# Patient Record
Sex: MALE | Race: BLACK OR AFRICAN AMERICAN | Employment: UNEMPLOYED | URBAN - METROPOLITAN AREA
[De-identification: names, ages, dates, MRNs, and addresses within clinical notes are randomized per-mention and may not be internally consistent; named-entity substitution may affect disease eponyms.]

---

## 2018-02-06 ENCOUNTER — HOSPITAL ENCOUNTER (EMERGENCY)
Age: 68
Discharge: HOME | End: 2018-02-06
Attending: EMERGENCY MEDICINE
Payer: MEDICARE

## 2018-02-06 ENCOUNTER — APPOINTMENT (OUTPATIENT)
Dept: GENERAL RADIOLOGY | Age: 68
End: 2018-02-06
Payer: MEDICARE

## 2018-02-06 VITALS
SYSTOLIC BLOOD PRESSURE: 147 MMHG | DIASTOLIC BLOOD PRESSURE: 100 MMHG | HEART RATE: 83 BPM | TEMPERATURE: 98.2 F | OXYGEN SATURATION: 93 % | RESPIRATION RATE: 20 BRPM

## 2018-02-06 DIAGNOSIS — J06.9 UPPER RESPIRATORY TRACT INFECTION, UNSPECIFIED TYPE: Primary | ICD-10-CM

## 2018-02-06 LAB
DIRECT EXAM: NORMAL
Lab: NORMAL
SPECIMEN DESCRIPTION: NORMAL
STATUS: NORMAL

## 2018-02-06 PROCEDURE — 71046 X-RAY EXAM CHEST 2 VIEWS: CPT

## 2018-02-06 PROCEDURE — 87804 INFLUENZA ASSAY W/OPTIC: CPT

## 2018-02-06 PROCEDURE — G0382 LEV 3 HOSP TYPE B ED VISIT: HCPCS

## 2018-02-06 RX ORDER — PSEUDOEPHEDRINE HYDROCHLORIDE 30 MG/1
30 TABLET ORAL EVERY 6 HOURS PRN
Qty: 20 TABLET | Refills: 0 | Status: SHIPPED | OUTPATIENT
Start: 2018-02-06 | End: 2018-02-13

## 2018-02-06 RX ORDER — IBUPROFEN 800 MG/1
800 TABLET ORAL EVERY 8 HOURS PRN
Qty: 30 TABLET | Refills: 0 | Status: SHIPPED | OUTPATIENT
Start: 2018-02-06

## 2018-02-06 RX ORDER — BENZONATATE 100 MG/1
100 CAPSULE ORAL 3 TIMES DAILY PRN
Qty: 30 CAPSULE | Refills: 0 | Status: SHIPPED | OUTPATIENT
Start: 2018-02-06 | End: 2018-02-13

## 2018-02-06 ASSESSMENT — ENCOUNTER SYMPTOMS
COUGH: 1
VOMITING: 0
TROUBLE SWALLOWING: 0
STRIDOR: 0
PHOTOPHOBIA: 0
WHEEZING: 0
SINUS PRESSURE: 1
CONSTIPATION: 0
DIARRHEA: 0
VOICE CHANGE: 0
SORE THROAT: 1
NAUSEA: 0
ABDOMINAL PAIN: 0
RHINORRHEA: 1

## 2018-02-06 ASSESSMENT — PAIN SCALES - GENERAL: PAINLEVEL_OUTOF10: 7

## 2018-02-06 ASSESSMENT — PAIN DESCRIPTION - PAIN TYPE: TYPE: ACUTE PAIN

## 2018-02-06 ASSESSMENT — PAIN DESCRIPTION - LOCATION: LOCATION: GENERALIZED

## 2018-02-07 NOTE — ED PROVIDER NOTES
Partners: Female     Other Topics Concern    Not on file     Social History Narrative    No narrative on file       History reviewed. No pertinent family history. Allergies:  Review of patient's allergies indicates no known allergies. Home Medications:  Prior to Admission medications    Medication Sig Start Date End Date Taking? Authorizing Provider   pseudoephedrine (DECONGESTANT) 30 MG tablet Take 1 tablet by mouth every 6 hours as needed for Congestion 2/6/18 2/13/18 Yes Leroy Barnes MD   ibuprofen (ADVIL;MOTRIN) 800 MG tablet Take 1 tablet by mouth every 8 hours as needed for Pain 2/6/18  Yes Leroy Barnes MD   benzonatate (TESSALON PERLES) 100 MG capsule Take 1 capsule by mouth 3 times daily as needed for Cough 2/6/18 2/13/18 Yes Leroy Barnes MD       REVIEW OF SYSTEMS    (2-9 systems for level 4, 10 or more for level 5)      Review of Systems   Constitutional: Positive for fatigue. Negative for activity change, appetite change, chills, diaphoresis and fever. HENT: Positive for congestion, rhinorrhea, sinus pressure and sore throat. Negative for trouble swallowing and voice change. Eyes: Negative for photophobia and visual disturbance. Respiratory: Positive for cough. Negative for wheezing and stridor. Cardiovascular: Negative for chest pain, palpitations and leg swelling. Gastrointestinal: Negative for abdominal pain, constipation, diarrhea, nausea and vomiting. Musculoskeletal: Negative for arthralgias and myalgias. Skin: Negative for rash and wound. Neurological: Negative for dizziness, weakness, light-headedness and numbness. Psychiatric/Behavioral: Negative for agitation, behavioral problems and confusion. PHYSICAL EXAM   (up to 7 for level 4, 8 or more for level 5)      INITIAL VITALS:   BP (!) 147/100   Pulse 83   Temp 98.2 °F (36.8 °C) (Oral)   Resp 20   SpO2 93%     Physical Exam   Constitutional: He appears well-developed and well-nourished. No distress. as needed for Pain    PSEUDOEPHEDRINE (DECONGESTANT) 30 MG TABLET    Take 1 tablet by mouth every 6 hours as needed for Congestion       Fortunato Brunson MD  Emergency Medicine Resident    (Please note that portions of this note were completed with a voice recognition program.  Efforts were made to edit the dictations but occasionally words are mis-transcribed.)       Fortunato Brunson MD  Resident  02/06/18 [id number]

## 2018-02-07 NOTE — ED PROVIDER NOTES
[facility]     Emergency Department     Faculty Attestation    I performed a history and physical examination of the patient and discussed management with the resident. I reviewed the residents note and agree with the documented findings including all diagnostic interpretations and plan of care. Any areas of disagreement are noted on the chart. I was personally present for the key portions of any procedures. I have documented in the chart those procedures where I was not present during the key portions. I have reviewed the emergency nurses triage note. I agree with the chief complaint, past medical history, past surgical history, allergies, medications, social and family history as documented unless otherwise noted below. Documentation of the HPI, Physical Exam and Medical Decision Making performed by christopheribregan is based on my personal performance of the HPI, PE and MDM. For Physician Assistant/ Nurse Practitioner cases/documentation I have personally evaluated this patient and have completed at least one if not all key elements of the E/M (history, physical exam, and MDM). Additional findings are as noted. Primary Care Physician: Aminata Spaulding    History: This is a 74 y.o. male who presents to the Emergency Department with complaint of generalized body aches, cough, congestion. History of COPD. Ongoing since Sunday. Has had difficulty getting around due to fatigue. No chest pain. No vomiting. No fevers. Physical:     oral temperature is 98.2 °F (36.8 °C). His blood pressure is 147/100 (abnormal) and his pulse is 83. His respiration is 20 and oxygen saturation is 93%. 74 y.o. male ill but nontoxic-appearing, oropharynx clear moist L cardiac exam regular rate and rhythm no murmurs or gallops, pulmonary auscultation bilaterally, abdomen is soft nontender nondistended. Radial pulses 2+.     Impression: Viral URI    Plan: Flu swab, chest x-ray,

## 2018-03-22 ENCOUNTER — HOSPITAL ENCOUNTER (INPATIENT)
Age: 68
LOS: 2 days | Discharge: HOME | DRG: 195 | End: 2018-03-24
Attending: EMERGENCY MEDICINE | Admitting: FAMILY MEDICINE
Payer: MEDICARE

## 2018-03-22 ENCOUNTER — APPOINTMENT (OUTPATIENT)
Dept: GENERAL RADIOLOGY | Age: 68
DRG: 195 | End: 2018-03-22
Payer: MEDICARE

## 2018-03-22 DIAGNOSIS — R50.9 FEVER AND CHILLS: ICD-10-CM

## 2018-03-22 DIAGNOSIS — D72.829 LEUKOCYTOSIS, UNSPECIFIED TYPE: ICD-10-CM

## 2018-03-22 DIAGNOSIS — J18.9 PNEUMONIA DUE TO ORGANISM: Primary | ICD-10-CM

## 2018-03-22 DIAGNOSIS — I10 HYPERTENSION, UNSPECIFIED TYPE: ICD-10-CM

## 2018-03-22 LAB
% CKMB: 0.9 % (ref 0–3.5)
ABSOLUTE EOS #: <0.03 K/UL (ref 0–0.44)
ABSOLUTE IMMATURE GRANULOCYTE: 0.09 K/UL (ref 0–0.3)
ABSOLUTE LYMPH #: 2.32 K/UL (ref 1.1–3.7)
ABSOLUTE MONO #: 1.37 K/UL (ref 0.1–1.2)
ALBUMIN SERPL-MCNC: 3.7 G/DL (ref 3.5–5.2)
ALBUMIN/GLOBULIN RATIO: 1 (ref 1–2.5)
ALP BLD-CCNC: 61 U/L (ref 40–129)
ALT SERPL-CCNC: 13 U/L (ref 5–41)
ANION GAP SERPL CALCULATED.3IONS-SCNC: 13 MMOL/L (ref 9–17)
AST SERPL-CCNC: 19 U/L
BASOPHILS # BLD: 0 % (ref 0–2)
BASOPHILS ABSOLUTE: 0.03 K/UL (ref 0–0.2)
BILIRUB SERPL-MCNC: 0.51 MG/DL (ref 0.3–1.2)
BILIRUBIN DIRECT: 0.21 MG/DL
BILIRUBIN, INDIRECT: 0.3 MG/DL (ref 0–1)
BUN BLDV-MCNC: 11 MG/DL (ref 8–23)
BUN/CREAT BLD: NORMAL (ref 9–20)
CALCIUM IONIZED: 1.22 MMOL/L (ref 1.13–1.33)
CALCIUM SERPL-MCNC: 9.1 MG/DL (ref 8.6–10.4)
CHLORIDE BLD-SCNC: 100 MMOL/L (ref 98–107)
CK MB: 1.1 NG/ML
CKMB INTERPRETATION: NORMAL
CO2: 24 MMOL/L (ref 20–31)
CREAT SERPL-MCNC: 0.85 MG/DL (ref 0.7–1.2)
DIFFERENTIAL TYPE: ABNORMAL
EKG ATRIAL RATE: 93 BPM
EKG P AXIS: 80 DEGREES
EKG P-R INTERVAL: 174 MS
EKG Q-T INTERVAL: 334 MS
EKG QRS DURATION: 96 MS
EKG QTC CALCULATION (BAZETT): 415 MS
EKG R AXIS: -61 DEGREES
EKG T AXIS: 81 DEGREES
EKG VENTRICULAR RATE: 93 BPM
EOSINOPHILS RELATIVE PERCENT: 0 % (ref 1–4)
GFR AFRICAN AMERICAN: >60 ML/MIN
GFR NON-AFRICAN AMERICAN: >60 ML/MIN
GFR SERPL CREATININE-BSD FRML MDRD: NORMAL ML/MIN/{1.73_M2}
GFR SERPL CREATININE-BSD FRML MDRD: NORMAL ML/MIN/{1.73_M2}
GLOBULIN: NORMAL G/DL (ref 1.5–3.8)
GLUCOSE BLD-MCNC: 97 MG/DL (ref 70–99)
HCT VFR BLD CALC: 40.5 % (ref 40.7–50.3)
HEMOGLOBIN: 12.6 G/DL (ref 13–17)
IMMATURE GRANULOCYTES: 1 %
INR BLD: 0.9
LACTIC ACID, WHOLE BLOOD: 1.2 MMOL/L (ref 0.7–2.1)
LYMPHOCYTES # BLD: 13 % (ref 24–43)
MCH RBC QN AUTO: 22.8 PG (ref 25.2–33.5)
MCHC RBC AUTO-ENTMCNC: 31.1 G/DL (ref 28.4–34.8)
MCV RBC AUTO: 73.2 FL (ref 82.6–102.9)
MONOCYTES # BLD: 8 % (ref 3–12)
NRBC AUTOMATED: 0 PER 100 WBC
PARTIAL THROMBOPLASTIN TIME: 26.2 SEC (ref 20.5–30.5)
PDW BLD-RTO: 17.3 % (ref 11.8–14.4)
PLATELET # BLD: ABNORMAL K/UL (ref 138–453)
PLATELET ESTIMATE: ABNORMAL
PLATELET, FLUORESCENCE: 155 K/UL (ref 138–453)
PLATELET, IMMATURE FRACTION: 9.6 % (ref 1.1–10.3)
PMV BLD AUTO: ABNORMAL FL (ref 8.1–13.5)
POC TROPONIN I: 0.01 NG/ML (ref 0–0.1)
POC TROPONIN INTERP: NORMAL
POTASSIUM SERPL-SCNC: 3.8 MMOL/L (ref 3.7–5.3)
PROTHROMBIN TIME: 9.9 SEC (ref 9–12)
RBC # BLD: 5.53 M/UL (ref 4.21–5.77)
RBC # BLD: ABNORMAL 10*6/UL
SEG NEUTROPHILS: 79 % (ref 36–65)
SEGMENTED NEUTROPHILS ABSOLUTE COUNT: 14.32 K/UL (ref 1.5–8.1)
SODIUM BLD-SCNC: 137 MMOL/L (ref 135–144)
TOTAL CK: 122 U/L (ref 39–308)
TOTAL PROTEIN: 7.3 G/DL (ref 6.4–8.3)
WBC # BLD: 18.1 K/UL (ref 3.5–11.3)
WBC # BLD: ABNORMAL 10*3/UL

## 2018-03-22 PROCEDURE — 87040 BLOOD CULTURE FOR BACTERIA: CPT

## 2018-03-22 PROCEDURE — 85025 COMPLETE CBC W/AUTO DIFF WBC: CPT

## 2018-03-22 PROCEDURE — 82553 CREATINE MB FRACTION: CPT

## 2018-03-22 PROCEDURE — 2580000003 HC RX 258: Performed by: NURSE PRACTITIONER

## 2018-03-22 PROCEDURE — 82330 ASSAY OF CALCIUM: CPT

## 2018-03-22 PROCEDURE — 80076 HEPATIC FUNCTION PANEL: CPT

## 2018-03-22 PROCEDURE — 6360000002 HC RX W HCPCS: Performed by: EMERGENCY MEDICINE

## 2018-03-22 PROCEDURE — 84484 ASSAY OF TROPONIN QUANT: CPT

## 2018-03-22 PROCEDURE — 99285 EMERGENCY DEPT VISIT HI MDM: CPT

## 2018-03-22 PROCEDURE — 85730 THROMBOPLASTIN TIME PARTIAL: CPT

## 2018-03-22 PROCEDURE — 71046 X-RAY EXAM CHEST 2 VIEWS: CPT

## 2018-03-22 PROCEDURE — 82550 ASSAY OF CK (CPK): CPT

## 2018-03-22 PROCEDURE — 6370000000 HC RX 637 (ALT 250 FOR IP): Performed by: EMERGENCY MEDICINE

## 2018-03-22 PROCEDURE — 85055 RETICULATED PLATELET ASSAY: CPT

## 2018-03-22 PROCEDURE — 80048 BASIC METABOLIC PNL TOTAL CA: CPT

## 2018-03-22 PROCEDURE — 83605 ASSAY OF LACTIC ACID: CPT

## 2018-03-22 PROCEDURE — 85610 PROTHROMBIN TIME: CPT

## 2018-03-22 PROCEDURE — 1200000000 HC SEMI PRIVATE

## 2018-03-22 PROCEDURE — 93005 ELECTROCARDIOGRAM TRACING: CPT

## 2018-03-22 RX ORDER — BENZONATATE 100 MG/1
100 CAPSULE ORAL ONCE
Status: COMPLETED | OUTPATIENT
Start: 2018-03-22 | End: 2018-03-22

## 2018-03-22 RX ORDER — NICOTINE 21 MG/24HR
1 PATCH, TRANSDERMAL 24 HOURS TRANSDERMAL DAILY PRN
Status: DISCONTINUED | OUTPATIENT
Start: 2018-03-22 | End: 2018-03-24 | Stop reason: HOSPADM

## 2018-03-22 RX ORDER — ACETAMINOPHEN 325 MG/1
650 TABLET ORAL EVERY 4 HOURS PRN
Status: DISCONTINUED | OUTPATIENT
Start: 2018-03-22 | End: 2018-03-24 | Stop reason: HOSPADM

## 2018-03-22 RX ORDER — DOCUSATE SODIUM 100 MG/1
100 CAPSULE, LIQUID FILLED ORAL 2 TIMES DAILY
Status: DISCONTINUED | OUTPATIENT
Start: 2018-03-22 | End: 2018-03-24 | Stop reason: HOSPADM

## 2018-03-22 RX ORDER — ASPIRIN 81 MG/1
324 TABLET, CHEWABLE ORAL ONCE
Status: COMPLETED | OUTPATIENT
Start: 2018-03-22 | End: 2018-03-22

## 2018-03-22 RX ORDER — LEVOFLOXACIN 5 MG/ML
750 INJECTION, SOLUTION INTRAVENOUS ONCE
Status: DISCONTINUED | OUTPATIENT
Start: 2018-03-22 | End: 2018-03-22

## 2018-03-22 RX ORDER — IPRATROPIUM BROMIDE AND ALBUTEROL SULFATE 2.5; .5 MG/3ML; MG/3ML
1 SOLUTION RESPIRATORY (INHALATION)
Status: DISCONTINUED | OUTPATIENT
Start: 2018-03-23 | End: 2018-03-24 | Stop reason: HOSPADM

## 2018-03-22 RX ORDER — SODIUM CHLORIDE 0.9 % (FLUSH) 0.9 %
10 SYRINGE (ML) INJECTION EVERY 12 HOURS SCHEDULED
Status: DISCONTINUED | OUTPATIENT
Start: 2018-03-22 | End: 2018-03-24 | Stop reason: HOSPADM

## 2018-03-22 RX ORDER — NITROGLYCERIN 0.4 MG/1
0.4 TABLET SUBLINGUAL ONCE
Status: COMPLETED | OUTPATIENT
Start: 2018-03-22 | End: 2018-03-22

## 2018-03-22 RX ORDER — ACETAMINOPHEN 325 MG/1
650 TABLET ORAL ONCE
Status: DISCONTINUED | OUTPATIENT
Start: 2018-03-22 | End: 2018-03-22

## 2018-03-22 RX ORDER — HYDROCODONE BITARTRATE AND ACETAMINOPHEN 5; 325 MG/1; MG/1
2 TABLET ORAL EVERY 4 HOURS PRN
Status: DISCONTINUED | OUTPATIENT
Start: 2018-03-22 | End: 2018-03-24 | Stop reason: HOSPADM

## 2018-03-22 RX ORDER — HYDROCODONE BITARTRATE AND ACETAMINOPHEN 5; 325 MG/1; MG/1
1 TABLET ORAL EVERY 4 HOURS PRN
Status: DISCONTINUED | OUTPATIENT
Start: 2018-03-22 | End: 2018-03-24 | Stop reason: HOSPADM

## 2018-03-22 RX ORDER — ALBUTEROL SULFATE 2.5 MG/3ML
2.5 SOLUTION RESPIRATORY (INHALATION)
Status: DISCONTINUED | OUTPATIENT
Start: 2018-03-22 | End: 2018-03-24 | Stop reason: HOSPADM

## 2018-03-22 RX ORDER — SODIUM CHLORIDE 0.9 % (FLUSH) 0.9 %
10 SYRINGE (ML) INJECTION PRN
Status: DISCONTINUED | OUTPATIENT
Start: 2018-03-22 | End: 2018-03-24 | Stop reason: HOSPADM

## 2018-03-22 RX ORDER — ONDANSETRON 2 MG/ML
4 INJECTION INTRAMUSCULAR; INTRAVENOUS EVERY 6 HOURS PRN
Status: DISCONTINUED | OUTPATIENT
Start: 2018-03-22 | End: 2018-03-24 | Stop reason: HOSPADM

## 2018-03-22 RX ORDER — BISACODYL 10 MG
10 SUPPOSITORY, RECTAL RECTAL DAILY PRN
Status: DISCONTINUED | OUTPATIENT
Start: 2018-03-22 | End: 2018-03-24 | Stop reason: HOSPADM

## 2018-03-22 RX ORDER — SODIUM CHLORIDE 9 MG/ML
INJECTION, SOLUTION INTRAVENOUS CONTINUOUS
Status: DISCONTINUED | OUTPATIENT
Start: 2018-03-22 | End: 2018-03-24 | Stop reason: HOSPADM

## 2018-03-22 RX ADMIN — ASPIRIN 81 MG 324 MG: 81 TABLET ORAL at 20:37

## 2018-03-22 RX ADMIN — LEVOFLOXACIN 750 MG: 5 INJECTION, SOLUTION INTRAVENOUS at 22:07

## 2018-03-22 RX ADMIN — BENZONATATE 100 MG: 100 CAPSULE ORAL at 20:37

## 2018-03-22 RX ADMIN — NITROGLYCERIN 0.4 MG: 0.4 TABLET SUBLINGUAL at 20:38

## 2018-03-22 RX ADMIN — SODIUM CHLORIDE: 9 INJECTION, SOLUTION INTRAVENOUS at 23:19

## 2018-03-22 ASSESSMENT — PAIN SCALES - GENERAL
PAINLEVEL_OUTOF10: 9
PAINLEVEL_OUTOF10: 7
PAINLEVEL_OUTOF10: 4

## 2018-03-22 ASSESSMENT — HEART SCORE: ECG: 1

## 2018-03-22 ASSESSMENT — PAIN DESCRIPTION - PAIN TYPE
TYPE: ACUTE PAIN

## 2018-03-22 ASSESSMENT — PAIN DESCRIPTION - LOCATION
LOCATION: CHEST

## 2018-03-22 ASSESSMENT — PAIN DESCRIPTION - ONSET: ONSET: OTHER (COMMENT)

## 2018-03-22 ASSESSMENT — PAIN DESCRIPTION - ORIENTATION: ORIENTATION: MID

## 2018-03-22 NOTE — ED PROVIDER NOTES
[facility]  ED  Emergency Department Encounter  Emergency Medicine Resident     Pt Name: Gemini Jorge  MRN: [id number]  [place] 1950  Date of evaluation: 3/22/18  PCP:  Jacinta Gruber       Chief Complaint   Patient presents with    Chest Pain    Cough       HISTORY OF PRESENT ILLNESS  (Location/Symptom, Timing/Onset, Context/Setting, Quality, Duration, Modifying Factors, Severity.)      Gemini Jorge is a 74 y.o. male who presents with acute  entire  chest pain that has been on going for 1 day. No radiation. It is pressure and last hours and not aggravated or relieved by anything including medications, position or breathing. Nyquil been given. Patient does not have adequate pain control. Moderate severity. Associated symptoms:  Nausea/Vomitting no  Diaphoresis slight  F/C:chills  Productive cough: yellow    Pt denied his CP being sudden onset ripping, tearing, and radiating to the back. Pt denied any recent surgeries, trauma, deep vein thrombosis, pulmonary embolisms, history of cancer, hormone use,  unilateral leg swelling, travel    PAST MEDICAL / SURGICAL / SOCIAL / FAMILY HISTORY      has a past medical history of Arthritis; CAD (coronary artery disease); Headache(784.0); Hyperlipidemia; Hypertension; Pneumonia; and Unspecified cerebral artery occlusion with cerebral infarction. has a past surgical history that includes fracture surgery. Social History     Social History    Marital status:      Spouse name: N/A    Number of children: N/A    Years of education: N/A     Occupational History    Not on file.      Social History Main Topics    Smoking status: Current Every Day Smoker     Packs/day: 1.00    Smokeless tobacco: Never Used    Alcohol use 0.6 oz/week     1 Cans of beer per week    Drug use: Yes     Types: Marijuana    Sexual activity: Yes     Partners: Female     Other Topics Concern    Not on file     Social History Narrative    EKG):  Orders Placed This Encounter   Procedures    Culture Blood #1    Culture Blood #1    Urine Culture    XR CHEST STANDARD (2 VW)    CBC Auto Differential    Basic Metabolic Panel    Calcium, Ionized    Immature Platelet Fraction    Urinalysis with Microscopic    Lactic Acid, Whole Blood    CK isoenzymes    Hepatic Function Panel    Protime-INR    APTT    Telemetry monitoring    Inpatient consult to Hospitalist    Pulse oximetry, continuous    POCT troponin    POCT troponin    EKG 12 Lead    Insert peripheral IV    PATIENT STATUS (FROM ED OR OR/PROCEDURAL) Inpatient       MEDICATIONS ORDERED:  Orders Placed This Encounter   Medications    aspirin chewable tablet 324 mg    nitroGLYCERIN (NITROSTAT) SL tablet 0.4 mg    benzonatate (TESSALON) capsule 100 mg    levofloxacin (LEVAQUIN) 750 MG/150ML infusion 750 mg    acetaminophen (TYLENOL) tablet 650 mg       DDX: ACS, tension pneumothorax pulmonary embolism aortic dissection esophageal rupture.     DIAGNOSTIC RESULTS / EMERGENCY DEPARTMENT COURSE / MDM     LABS:  Results for orders placed or performed during the hospital encounter of 03/22/18   CBC Auto Differential   Result Value Ref Range    WBC 18.1 (H) 3.5 - 11.3 k/uL    RBC 5.53 4.21 - 5.77 m/uL    Hemoglobin 12.6 (L) 13.0 - 17.0 g/dL    Hematocrit 40.5 (L) 40.7 - 50.3 %    MCV 73.2 (L) 82.6 - 102.9 fL    MCH 22.8 (L) 25.2 - 33.5 pg    MCHC 31.1 28.4 - 34.8 g/dL    RDW 17.3 (H) 11.8 - 14.4 %    Platelets See Reflexed IPF Result 138 - 453 k/uL    MPV NOT REPORTED 8.1 - 13.5 fL    NRBC Automated 0.0 0.0 per 100 WBC    Differential Type NOT REPORTED     WBC Morphology NOT REPORTED     RBC Morphology ANISOCYTOSIS PRESENT     Platelet Estimate NOT REPORTED     Seg Neutrophils 79 (H) 36 - 65 %    Lymphocytes 13 (L) 24 - 43 %    Monocytes 8 3 - 12 %    Eosinophils % 0 (L) 1 - 4 %    Basophils 0 0 - 2 %    Immature Granulocytes 1 (H) 0 %    Segs Absolute 14.32 (H) 1.50 - 8.10 k/uL    Absolute

## 2018-03-23 ENCOUNTER — APPOINTMENT (OUTPATIENT)
Dept: GENERAL RADIOLOGY | Age: 68
DRG: 195 | End: 2018-03-23
Payer: MEDICARE

## 2018-03-23 LAB
ANION GAP SERPL CALCULATED.3IONS-SCNC: 13 MMOL/L (ref 9–17)
BNP INTERPRETATION: NORMAL
BUN BLDV-MCNC: 11 MG/DL (ref 8–23)
BUN/CREAT BLD: NORMAL (ref 9–20)
CALCIUM SERPL-MCNC: 8.9 MG/DL (ref 8.6–10.4)
CHLORIDE BLD-SCNC: 102 MMOL/L (ref 98–107)
CO2: 24 MMOL/L (ref 20–31)
CREAT SERPL-MCNC: 0.76 MG/DL (ref 0.7–1.2)
CULTURE: NORMAL
D-DIMER QUANTITATIVE: 0.91 MG/L FEU
GFR AFRICAN AMERICAN: >60 ML/MIN
GFR NON-AFRICAN AMERICAN: >60 ML/MIN
GFR SERPL CREATININE-BSD FRML MDRD: NORMAL ML/MIN/{1.73_M2}
GFR SERPL CREATININE-BSD FRML MDRD: NORMAL ML/MIN/{1.73_M2}
GLUCOSE BLD-MCNC: 96 MG/DL (ref 70–99)
HCT VFR BLD CALC: 35.4 % (ref 40.7–50.3)
HEMOGLOBIN: 11.2 G/DL (ref 13–17)
Lab: NORMAL
Lab: NORMAL
MCH RBC QN AUTO: 23.5 PG (ref 25.2–33.5)
MCHC RBC AUTO-ENTMCNC: 31.6 G/DL (ref 28.4–34.8)
MCV RBC AUTO: 74.4 FL (ref 82.6–102.9)
MYOGLOBIN: 23 NG/ML (ref 28–72)
NRBC AUTOMATED: 0 PER 100 WBC
PDW BLD-RTO: 17.2 % (ref 11.8–14.4)
PLATELET # BLD: ABNORMAL K/UL (ref 138–453)
PLATELET, FLUORESCENCE: 169 K/UL (ref 138–453)
PLATELET, IMMATURE FRACTION: 8 % (ref 1.1–10.3)
PMV BLD AUTO: ABNORMAL FL (ref 8.1–13.5)
POTASSIUM SERPL-SCNC: 3.9 MMOL/L (ref 3.7–5.3)
PRO-BNP: 156 PG/ML
RBC # BLD: 4.76 M/UL (ref 4.21–5.77)
SODIUM BLD-SCNC: 139 MMOL/L (ref 135–144)
SPECIMEN DESCRIPTION: NORMAL
SPECIMEN DESCRIPTION: NORMAL
STATUS: NORMAL
STATUS: NORMAL
TROPONIN INTERP: ABNORMAL
TROPONIN T: <0.03 NG/ML
WBC # BLD: 14.8 K/UL (ref 3.5–11.3)

## 2018-03-23 PROCEDURE — 36415 COLL VENOUS BLD VENIPUNCTURE: CPT

## 2018-03-23 PROCEDURE — 84484 ASSAY OF TROPONIN QUANT: CPT

## 2018-03-23 PROCEDURE — 6370000000 HC RX 637 (ALT 250 FOR IP): Performed by: NURSE PRACTITIONER

## 2018-03-23 PROCEDURE — 1200000000 HC SEMI PRIVATE

## 2018-03-23 PROCEDURE — 99222 1ST HOSP IP/OBS MODERATE 55: CPT | Performed by: FAMILY MEDICINE

## 2018-03-23 PROCEDURE — 71046 X-RAY EXAM CHEST 2 VIEWS: CPT

## 2018-03-23 PROCEDURE — 83874 ASSAY OF MYOGLOBIN: CPT

## 2018-03-23 PROCEDURE — 94640 AIRWAY INHALATION TREATMENT: CPT

## 2018-03-23 PROCEDURE — 94762 N-INVAS EAR/PLS OXIMTRY CONT: CPT

## 2018-03-23 PROCEDURE — 85027 COMPLETE CBC AUTOMATED: CPT

## 2018-03-23 PROCEDURE — 80048 BASIC METABOLIC PNL TOTAL CA: CPT

## 2018-03-23 PROCEDURE — 2580000003 HC RX 258: Performed by: NURSE PRACTITIONER

## 2018-03-23 PROCEDURE — 6360000002 HC RX W HCPCS: Performed by: NURSE PRACTITIONER

## 2018-03-23 PROCEDURE — 85379 FIBRIN DEGRADATION QUANT: CPT

## 2018-03-23 PROCEDURE — 83880 ASSAY OF NATRIURETIC PEPTIDE: CPT

## 2018-03-23 PROCEDURE — 85055 RETICULATED PLATELET ASSAY: CPT

## 2018-03-23 RX ADMIN — IPRATROPIUM BROMIDE AND ALBUTEROL SULFATE 1 AMPULE: .5; 3 SOLUTION RESPIRATORY (INHALATION) at 08:38

## 2018-03-23 RX ADMIN — IPRATROPIUM BROMIDE AND ALBUTEROL SULFATE 1 AMPULE: .5; 3 SOLUTION RESPIRATORY (INHALATION) at 16:15

## 2018-03-23 RX ADMIN — CEFTRIAXONE SODIUM 1 G: 1 INJECTION, POWDER, FOR SOLUTION INTRAMUSCULAR; INTRAVENOUS at 23:44

## 2018-03-23 RX ADMIN — AZITHROMYCIN MONOHYDRATE 500 MG: 500 INJECTION, POWDER, LYOPHILIZED, FOR SOLUTION INTRAVENOUS at 01:49

## 2018-03-23 RX ADMIN — AZITHROMYCIN MONOHYDRATE 500 MG: 500 INJECTION, POWDER, LYOPHILIZED, FOR SOLUTION INTRAVENOUS at 23:44

## 2018-03-23 RX ADMIN — IPRATROPIUM BROMIDE AND ALBUTEROL SULFATE 1 AMPULE: .5; 3 SOLUTION RESPIRATORY (INHALATION) at 20:55

## 2018-03-23 RX ADMIN — IPRATROPIUM BROMIDE AND ALBUTEROL SULFATE 1 AMPULE: .5; 3 SOLUTION RESPIRATORY (INHALATION) at 12:06

## 2018-03-23 RX ADMIN — CEFTRIAXONE SODIUM 1 G: 1 INJECTION, POWDER, FOR SOLUTION INTRAMUSCULAR; INTRAVENOUS at 01:50

## 2018-03-23 NOTE — CARE COORDINATION
Case Management Initial Discharge Plan  Maria C Ahuja,         Readmission Risk              Readmission Risk:        3.5       Age 74 or Greater:  1    Admitted from SNF or Requires Paid or Family Care:  0    Currently has CHF,COPD,ARF,CRI,or is on dialysis:  0    Takes more than 5 Prescription Medications:  0    Takes Digoxin,Insulin,Anticoagulants,Narcotics or ASA/Plavix:  [address] in Past 12 Months:  0    On Disability:  0    Patient Considers own Health:  2.5            Met with:patient to discuss discharge plans.    Information verified: address, contacts, phone number, , insurance Yes  PCP: Dr. Tomas Ocasio  Date of last visit: past 6 months    Insurance Provider: [address]    Discharge Planning  Current Residence:  Private Residence  Living Arrangements:  Spouse/Significant Other   Home has 1 stories/few stairs to climb  Support Systems:  Spouse/Significant Other, Children  Current Services PTA:  None Supplier:   Patient able to perform ADL's:Independent  DME used to aid ambulation prior to admission:  None /during admission none    Potential Assistance Needed:  N/A    Pharmacy: [facility] on  Medications:  No  Does patient want to participate in local refill/ meds to beds program?  No    Patient agreeable to home care: No  [place] of choice provided:  n/a      Type of Home Care Services:  None  Patient expects to be discharged to:  Home    Prior SNF/Rehab Placement and Facility:   Agreeable to SNF/Rehab: No  [place] of choice provided: n/a   Evaluation: no    Expected Discharge date:  18  Follow Up Appointment: Best Day/ Time:      Transportation provider:  Self/wife  Transportation arrangements needed for discharge: Yes    Discharge Plan: home independently        Electronically signed by Fitch RN on 3/23/18 at 4:45 PM

## 2018-03-23 NOTE — PROGRESS NOTES
Patient refused Lovenox injection,   RN education on purpose and DVT prevention, pt still refused   Jnaa Rodriguez RN

## 2018-03-23 NOTE — H&P
[facility]    HISTORY AND PHYSICAL EXAMINATION            Date:   3/23/2018  Patient name:  Maria C Ahuja  Date of admission:  3/22/2018  7:29 PM  MRN:   [id number]  Account:  [de-identified]  YOB: 1950  PCP:    Tana Carver  Room:   4007/2593-09  Code Status:    Full Code    Chief Complaint:     Chief Complaint   Patient presents with    Chest Pain    Cough       History Obtained From:     patient, electronic medical record    History of Present Illness: The patient is a 74 y.o. Non-/non  male who presents with Chest Pain and Cough   and he is admitted to the hospital for the management of Pneumonia. 30-year-old male presented to ED with chest pain and shortness of breath. Patient describes chest pain that sharp, constant, and located diffusely in bilateral chest and abdomen, with no alleviating or aggravating factors. Patient states shortness of breath going since Wednesday. Denies any fever, but reports having chills and nausea. No vomiting. Reports having cough with yellow sputum. CXR with small LLL opacity. WBC elevated at 18. Patient reports having wheezing yesterday. No wheezing today. Past Medical History:     Past Medical History:   Diagnosis Date    Arthritis     CAD (coronary artery disease)     Headache(784.0)     Hyperlipidemia     Hypertension     Pneumonia     Unspecified cerebral artery occlusion with cerebral infarction         Past Surgical History:     Past Surgical History:   Procedure Laterality Date    FRACTURE SURGERY          Medications Prior to Admission:     Prior to Admission medications    Medication Sig Start Date End Date Taking? Authorizing Provider   ibuprofen (ADVIL;MOTRIN) 800 MG tablet Take 1 tablet by mouth every 8 hours as needed for Pain 2/6/18   Fortunato Brunson MD        Allergies:     Patient has no known allergies.     Social History:     Tobacco: Phosphatase 61 40 - 129 U/L    ALT 13 5 - 41 U/L    AST 19 <40 U/L    Total Bilirubin 0.51 0.3 - 1.2 mg/dL    Bilirubin, Direct 0.21 <0.31 mg/dL    Bilirubin, Indirect 0.30 0.00 - 1.00 mg/dL    Total Protein 7.3 6.4 - 8.3 g/dL    Globulin NOT REPORTED 1.5 - 3.8 g/dL    Albumin/Globulin Ratio 1.0 1.0 - 2.5   Protime-INR    Collection Time: 03/22/18  8:16 PM   Result Value Ref Range    Protime 9.9 9.0 - 12.0 sec    INR 0.9    APTT    Collection Time: 03/22/18  8:16 PM   Result Value Ref Range    PTT 26.2 20.5 - 30.5 sec   Culture Blood #1    Collection Time: 03/22/18  9:05 PM   Result Value Ref Range    Specimen Description . BLOOD     Special Requests  L AC 11ML     Culture NO GROWTH 15 HOURS     Culture       [address] [phone]    Status Pending    Culture Blood #1    Collection Time: 03/22/18  9:05 PM   Result Value Ref Range    Specimen Description . BLOOD     Special Requests  RT FOREARM 12ML     Culture NO GROWTH 15 HOURS     Culture       [address] [phone]    Status Pending    Lactic Acid, Whole Blood    Collection Time: 03/22/18  9:23 PM   Result Value Ref Range    Lactic Acid, Whole Blood 1.2 0.7 - 2.1 mmol/L   CULTURE BLOOD #1    Collection Time: 03/22/18 11:15 PM   Result Value Ref Range    Specimen Description . BLOOD     Special Requests NOT REPORTED     Culture DUPLICATE ORDER     Culture       [address] [phone]    Status FINAL 03/22/2018    CULTURE BLOOD #2    Collection Time: 03/22/18 11:15 PM   Result Value Ref Range    Specimen Description . BLOOD     Special Requests NOT REPORTED     Culture DUPLICATE ORDER     Culture       [address] [phone]    Status FINAL 95/23/8822    Basic Metabolic Panel w/ Reflex to MG    Collection Time: 03/23/18  6:38 AM   Result Value Ref Range    Glucose 96 70 - 99 mg/dL    BUN 11 8 - 23 mg/dL    CREATININE 0.76 0.70 - 1.20 mg/dL    Bun/Cre Ratio NOT REPORTED 9 - 20    Calcium 8.9 8.6 - 10.4 mg/dL    Sodium 139 135 - 144 mmol/L    Potassium 3.9 3.7 - 5.3 mmol/L    Chloride 102 98 - 107 mmol/L    CO2 24 20 - 31 mmol/L    Anion Gap 13 9 - 17 mmol/L    GFR Non-African American >60 >60 mL/min    GFR African American >60 >60 mL/min    GFR Comment          GFR Staging NOT REPORTED    CBC    Collection Time: 03/23/18  6:38 AM   Result Value Ref Range    WBC 14.8 (H) 3.5 - 11.3 k/uL    RBC 4.76 4.21 - 5.77 m/uL    Hemoglobin 11.2 (L) 13.0 - 17.0 g/dL    Hematocrit 35.4 (L) 40.7 - 50.3 %    MCV 74.4 (L) 82.6 - 102.9 fL    MCH 23.5 (L) 25.2 - 33.5 pg    MCHC 31.6 28.4 - 34.8 g/dL    RDW 17.2 (H) 11.8 - 14.4 %    Platelets See Reflexed IPF Result 138 - 453 k/uL    MPV NOT REPORTED 8.1 - 13.5 fL    NRBC Automated 0.0 0.0 per 100 WBC   Immature Platelet Fraction    Collection Time: 03/23/18  6:38 AM   Result Value Ref Range    Platelet, Immature Fraction 8.0 1.1 - 10.3 %    Platelet, Fluorescence 169 138 - 453 k/uL       Imaging/Diagnostics:    Xr Chest Standard (2 Vw)    Result Date: 3/23/2018  EXAMINATION: TWO VIEWS OF THE CHEST 3/23/2018 9:59 am COMPARISON: Two-view chest from 03/22/2018 HISTORY: ORDERING SYSTEM PROVIDED HISTORY: Pneumonia TECHNOLOGIST PROVIDED HISTORY: Reason for exam:->Pneumonia History of hypertension, pneumonia, coronary artery disease and cerebral infarction. FINDINGS: Overlying ECG monitor snaps. Nonenlarged stable appearing cardiac silhouette. Uncoiled thoracic aorta, similar by comparison ; a degree of ectasia descending thoracic aorta possible. Mildly hyperinflated lungs. Left lower lobe density appears improved. No localized pulmonary opacity or blunting of the costophrenic angles. Straightening thoracic kyphosis with mild DJD. Improving atelectasis or infiltrate left base. COPD. Some uncoiling thoracic aorta, as above.      Xr Chest Standard (2 Vw)    Result Date:

## 2018-03-24 ENCOUNTER — APPOINTMENT (OUTPATIENT)
Dept: CT IMAGING | Age: 68
DRG: 195 | End: 2018-03-24
Payer: MEDICARE

## 2018-03-24 VITALS
HEART RATE: 68 BPM | TEMPERATURE: 97.5 F | BODY MASS INDEX: 21.83 KG/M2 | WEIGHT: 170.1 LBS | RESPIRATION RATE: 18 BRPM | DIASTOLIC BLOOD PRESSURE: 82 MMHG | OXYGEN SATURATION: 98 % | HEIGHT: 74 IN | SYSTOLIC BLOOD PRESSURE: 142 MMHG

## 2018-03-24 LAB
ABSOLUTE EOS #: 0.05 K/UL (ref 0–0.44)
ABSOLUTE IMMATURE GRANULOCYTE: 0.05 K/UL (ref 0–0.3)
ABSOLUTE LYMPH #: 2.56 K/UL (ref 1.1–3.7)
ABSOLUTE MONO #: 1.24 K/UL (ref 0.1–1.2)
ANION GAP SERPL CALCULATED.3IONS-SCNC: 14 MMOL/L (ref 9–17)
BASOPHILS # BLD: 0 % (ref 0–2)
BASOPHILS ABSOLUTE: 0.03 K/UL (ref 0–0.2)
BUN BLDV-MCNC: 12 MG/DL (ref 8–23)
BUN/CREAT BLD: ABNORMAL (ref 9–20)
CALCIUM SERPL-MCNC: 9 MG/DL (ref 8.6–10.4)
CHLORIDE BLD-SCNC: 106 MMOL/L (ref 98–107)
CO2: 22 MMOL/L (ref 20–31)
CREAT SERPL-MCNC: 0.72 MG/DL (ref 0.7–1.2)
DIFFERENTIAL TYPE: ABNORMAL
EOSINOPHILS RELATIVE PERCENT: 0 % (ref 1–4)
GFR AFRICAN AMERICAN: >60 ML/MIN
GFR NON-AFRICAN AMERICAN: >60 ML/MIN
GFR SERPL CREATININE-BSD FRML MDRD: ABNORMAL ML/MIN/{1.73_M2}
GFR SERPL CREATININE-BSD FRML MDRD: ABNORMAL ML/MIN/{1.73_M2}
GLUCOSE BLD-MCNC: 129 MG/DL (ref 70–99)
HCT VFR BLD CALC: 36.8 % (ref 40.7–50.3)
HEMOGLOBIN: 11.4 G/DL (ref 13–17)
IMMATURE GRANULOCYTES: 0 %
LYMPHOCYTES # BLD: 21 % (ref 24–43)
MCH RBC QN AUTO: 22.7 PG (ref 25.2–33.5)
MCHC RBC AUTO-ENTMCNC: 31 G/DL (ref 28.4–34.8)
MCV RBC AUTO: 73.3 FL (ref 82.6–102.9)
MONOCYTES # BLD: 10 % (ref 3–12)
MYOGLOBIN: 25 NG/ML (ref 28–72)
MYOGLOBIN: 26 NG/ML (ref 28–72)
NRBC AUTOMATED: 0 PER 100 WBC
PDW BLD-RTO: 17.2 % (ref 11.8–14.4)
PLATELET # BLD: ABNORMAL K/UL (ref 138–453)
PLATELET ESTIMATE: ABNORMAL
PLATELET, FLUORESCENCE: 162 K/UL (ref 138–453)
PLATELET, IMMATURE FRACTION: 8.6 % (ref 1.1–10.3)
PMV BLD AUTO: ABNORMAL FL (ref 8.1–13.5)
POTASSIUM SERPL-SCNC: 3.8 MMOL/L (ref 3.7–5.3)
RBC # BLD: 5.02 M/UL (ref 4.21–5.77)
RBC # BLD: ABNORMAL 10*6/UL
SEG NEUTROPHILS: 68 % (ref 36–65)
SEGMENTED NEUTROPHILS ABSOLUTE COUNT: 8.19 K/UL (ref 1.5–8.1)
SODIUM BLD-SCNC: 142 MMOL/L (ref 135–144)
TROPONIN INTERP: ABNORMAL
TROPONIN INTERP: ABNORMAL
TROPONIN T: <0.03 NG/ML
TROPONIN T: <0.03 NG/ML
WBC # BLD: 12.1 K/UL (ref 3.5–11.3)
WBC # BLD: ABNORMAL 10*3/UL

## 2018-03-24 PROCEDURE — 6370000000 HC RX 637 (ALT 250 FOR IP): Performed by: NURSE PRACTITIONER

## 2018-03-24 PROCEDURE — 84484 ASSAY OF TROPONIN QUANT: CPT

## 2018-03-24 PROCEDURE — 71260 CT THORAX DX C+: CPT

## 2018-03-24 PROCEDURE — 85025 COMPLETE CBC W/AUTO DIFF WBC: CPT

## 2018-03-24 PROCEDURE — 83874 ASSAY OF MYOGLOBIN: CPT

## 2018-03-24 PROCEDURE — 99239 HOSP IP/OBS DSCHRG MGMT >30: CPT | Performed by: FAMILY MEDICINE

## 2018-03-24 PROCEDURE — 6360000004 HC RX CONTRAST MEDICATION: Performed by: FAMILY MEDICINE

## 2018-03-24 PROCEDURE — 2580000003 HC RX 258: Performed by: NURSE PRACTITIONER

## 2018-03-24 PROCEDURE — 80048 BASIC METABOLIC PNL TOTAL CA: CPT

## 2018-03-24 PROCEDURE — 94762 N-INVAS EAR/PLS OXIMTRY CONT: CPT

## 2018-03-24 PROCEDURE — 36415 COLL VENOUS BLD VENIPUNCTURE: CPT

## 2018-03-24 PROCEDURE — 94640 AIRWAY INHALATION TREATMENT: CPT

## 2018-03-24 PROCEDURE — 85055 RETICULATED PLATELET ASSAY: CPT

## 2018-03-24 RX ORDER — LEVOFLOXACIN 500 MG/1
500 TABLET, FILM COATED ORAL DAILY
Qty: 5 TABLET | Refills: 0 | Status: SHIPPED | OUTPATIENT
Start: 2018-03-24 | End: 2018-03-29

## 2018-03-24 RX ORDER — NICOTINE 21 MG/24HR
1 PATCH, TRANSDERMAL 24 HOURS TRANSDERMAL DAILY PRN
Qty: 30 PATCH | Refills: 3 | Status: SHIPPED | OUTPATIENT
Start: 2018-03-24

## 2018-03-24 RX ORDER — ALBUTEROL SULFATE 90 UG/1
2 AEROSOL, METERED RESPIRATORY (INHALATION) EVERY 6 HOURS PRN
Qty: 1 INHALER | Refills: 3 | Status: SHIPPED | OUTPATIENT
Start: 2018-03-24

## 2018-03-24 RX ADMIN — IPRATROPIUM BROMIDE AND ALBUTEROL SULFATE 1 AMPULE: .5; 3 SOLUTION RESPIRATORY (INHALATION) at 11:28

## 2018-03-24 RX ADMIN — SODIUM CHLORIDE: 9 INJECTION, SOLUTION INTRAVENOUS at 04:09

## 2018-03-24 RX ADMIN — ACETAMINOPHEN 650 MG: 325 TABLET ORAL at 04:08

## 2018-03-24 RX ADMIN — IPRATROPIUM BROMIDE AND ALBUTEROL SULFATE 1 AMPULE: .5; 3 SOLUTION RESPIRATORY (INHALATION) at 08:16

## 2018-03-24 RX ADMIN — IOPAMIDOL 75 ML: 755 INJECTION, SOLUTION INTRAVENOUS at 10:22

## 2018-03-24 ASSESSMENT — PAIN SCALES - GENERAL: PAINLEVEL_OUTOF10: 5

## 2018-03-24 NOTE — PLAN OF CARE
Problem: Pain:  Goal: Pain level will decrease  Pain level will decrease   Outcome: Ongoing    Goal: Control of acute pain  Control of acute pain   Outcome: Ongoing    Goal: Control of chronic pain  Control of chronic pain   Outcome: Ongoing      Problem: Nutrition  Goal: Optimal nutrition therapy  Outcome: Ongoing

## 2018-03-24 NOTE — DISCHARGE SUMMARY
arteries are adequately opacified for evaluation. No evidence of intraluminal filling defect to suggest pulmonary embolism. Main pulmonary artery is mildly enlarged measuring 38 mm. Mediastinum: No evidence of mediastinal lymphadenopathy. The heart and pericardium demonstrate no acute abnormality. There is no acute abnormality of the thoracic aorta. Calcified atheromatous plaque is present. Lungs/pleura: In opacities present in the posterior left lower lobe with scattered ground-glass density. Mild centrilobular emphysematous disease is noted. No pneumothorax. No pleural effusion. The airway appears patent. Upper Abdomen: No acute findings. Soft Tissues/Bones: No acute bone or soft tissue abnormality. Mild bilateral gynecomastia is noted. No evidence of pulmonary embolism. Opacities in the left lower lobe may represent developing consolidation or atelectasis. Emphysema. Mild pulmonary arterial enlargement, suggestive of pulmonary hypertension. Consultations:    Consults:     Final Specialist Recommendations/Findings:   IP CONSULT TO HOSPITALIST      The patient was seen and examined on day of discharge and this discharge summary is in conjunction with any daily progress note from day of discharge. Discharge plan:     Disposition: Home    Physician Follow Up:     Adarsh Marie  [address] [facility] [address]    Schedule an appointment as soon as possible for a visit         Requiring Further Evaluation/Follow Up POST HOSPITALIZATION/Incidental Findings:     HTN: will need OP follow up BP control    Diet: regular diet    Activity: As tolerated    Instructions to Patient:   Carolyne Nyhan as prescribed.    Follow up with PCP for HTN, COPD and transitional care     Discharge Medications:      Medication List      START taking these medications    albuterol sulfate  (90 Base) MCG/ACT inhaler  Commonly known as:  VENTOLIN HFA  Inhale 2 puffs into the lungs every 6 hours as needed for Wheezing     levofloxacin 500 MG tablet  Commonly known as:  LEVAQUIN  Take 1 tablet by mouth daily for 5 days     nicotine 21 MG/24HR  Commonly known as:  NICODERM CQ  Place 1 patch onto the skin daily as needed (if patient smokes/requests nicotine replacent therapy)     tiotropium 18 MCG inhalation capsule  Commonly known as:  SPIRIVA HANDIHALER  Inhale 1 capsule into the lungs daily        CONTINUE taking these medications    ibuprofen 800 MG tablet  Commonly known as:  ADVIL;MOTRIN  Take 1 tablet by mouth every 8 hours as needed for Pain           Where to Get Your Medications      These medications were sent to [address] [place] [address] [phone]  [address] [place] [address] () [address]    Phone:  [phone]   · albuterol sulfate  (90 Base) MCG/ACT inhaler  · levofloxacin 500 MG tablet  · nicotine 21 MG/24HR  · tiotropium 18 MCG inhalation capsule         Time Spent on discharge is  32 mins in patient examination, evaluation, counseling as well as medication reconciliation, prescriptions for required medications, discharge plan and follow up. Electronically signed by   Duane Hargrove MD  3/24/2018  5:56 PM      Thank you Dr. Dominik Rojas for the opportunity to be involved in this patient's care.

## 2018-03-28 LAB
CULTURE: NORMAL
Lab: NORMAL
Lab: NORMAL
SPECIMEN DESCRIPTION: NORMAL
SPECIMEN DESCRIPTION: NORMAL
STATUS: NORMAL
STATUS: NORMAL

## 2019-01-31 ENCOUNTER — APPOINTMENT (OUTPATIENT)
Dept: GENERAL RADIOLOGY | Age: 69
End: 2019-01-31

## 2019-01-31 ENCOUNTER — HOSPITAL ENCOUNTER (EMERGENCY)
Age: 69
Discharge: HOME | End: 2019-01-31
Attending: EMERGENCY MEDICINE

## 2019-01-31 VITALS
RESPIRATION RATE: 18 BRPM | HEIGHT: 74 IN | SYSTOLIC BLOOD PRESSURE: 171 MMHG | OXYGEN SATURATION: 94 % | DIASTOLIC BLOOD PRESSURE: 104 MMHG | TEMPERATURE: 98.7 F | BODY MASS INDEX: 21.82 KG/M2 | WEIGHT: 170 LBS | HEART RATE: 91 BPM

## 2019-01-31 DIAGNOSIS — J06.9 VIRAL UPPER RESPIRATORY TRACT INFECTION: Primary | ICD-10-CM

## 2019-01-31 LAB
ANION GAP SERPL CALCULATED.3IONS-SCNC: 11 MMOL/L (ref 9–17)
BUN BLDV-MCNC: 11 MG/DL (ref 8–23)
BUN/CREAT BLD: NORMAL (ref 9–20)
CALCIUM SERPL-MCNC: 9.4 MG/DL (ref 8.6–10.4)
CHLORIDE BLD-SCNC: 103 MMOL/L (ref 98–107)
CO2: 22 MMOL/L (ref 20–31)
CREAT SERPL-MCNC: 0.85 MG/DL (ref 0.7–1.2)
DIRECT EXAM: NORMAL
GFR AFRICAN AMERICAN: >60 ML/MIN
GFR NON-AFRICAN AMERICAN: >60 ML/MIN
GFR SERPL CREATININE-BSD FRML MDRD: NORMAL ML/MIN/{1.73_M2}
GFR SERPL CREATININE-BSD FRML MDRD: NORMAL ML/MIN/{1.73_M2}
GLUCOSE BLD-MCNC: 84 MG/DL (ref 70–99)
HCT VFR BLD CALC: 41.3 % (ref 40.7–50.3)
HEMOGLOBIN: 13.3 G/DL (ref 13–17)
Lab: NORMAL
POTASSIUM SERPL-SCNC: 4.9 MMOL/L (ref 3.7–5.3)
SODIUM BLD-SCNC: 136 MMOL/L (ref 135–144)
SPECIMEN DESCRIPTION: NORMAL
STATUS: NORMAL

## 2019-01-31 PROCEDURE — 85018 HEMOGLOBIN: CPT

## 2019-01-31 PROCEDURE — 71046 X-RAY EXAM CHEST 2 VIEWS: CPT

## 2019-01-31 PROCEDURE — 94640 AIRWAY INHALATION TREATMENT: CPT

## 2019-01-31 PROCEDURE — 6370000000 HC RX 637 (ALT 250 FOR IP): Performed by: STUDENT IN AN ORGANIZED HEALTH CARE EDUCATION/TRAINING PROGRAM

## 2019-01-31 PROCEDURE — 6370000000 HC RX 637 (ALT 250 FOR IP): Performed by: EMERGENCY MEDICINE

## 2019-01-31 PROCEDURE — 6360000002 HC RX W HCPCS: Performed by: EMERGENCY MEDICINE

## 2019-01-31 PROCEDURE — 80048 BASIC METABOLIC PNL TOTAL CA: CPT

## 2019-01-31 PROCEDURE — 87804 INFLUENZA ASSAY W/OPTIC: CPT

## 2019-01-31 PROCEDURE — G0383 LEV 4 HOSP TYPE B ED VISIT: HCPCS

## 2019-01-31 PROCEDURE — 85014 HEMATOCRIT: CPT

## 2019-01-31 RX ORDER — PREDNISONE 50 MG/1
50 TABLET ORAL DAILY
Qty: 4 TABLET | Refills: 0 | Status: SHIPPED | OUTPATIENT
Start: 2019-01-31 | End: 2019-02-04

## 2019-01-31 RX ORDER — BENZONATATE 100 MG/1
100 CAPSULE ORAL ONCE
Status: COMPLETED | OUTPATIENT
Start: 2019-01-31 | End: 2019-01-31

## 2019-01-31 RX ORDER — PREDNISONE 20 MG/1
60 TABLET ORAL ONCE
Status: COMPLETED | OUTPATIENT
Start: 2019-01-31 | End: 2019-01-31

## 2019-01-31 RX ORDER — BENZONATATE 100 MG/1
100 CAPSULE ORAL 2 TIMES DAILY PRN
Qty: 14 CAPSULE | Refills: 0 | Status: SHIPPED | OUTPATIENT
Start: 2019-01-31 | End: 2019-02-07

## 2019-01-31 RX ORDER — ALBUTEROL SULFATE 90 UG/1
2 AEROSOL, METERED RESPIRATORY (INHALATION) 4 TIMES DAILY PRN
Qty: 1 INHALER | Refills: 0 | Status: SHIPPED | OUTPATIENT
Start: 2019-01-31

## 2019-01-31 RX ORDER — GUAIFENESIN 600 MG/1
600 TABLET, EXTENDED RELEASE ORAL 2 TIMES DAILY
Qty: 14 TABLET | Refills: 0 | Status: SHIPPED | OUTPATIENT
Start: 2019-01-31 | End: 2019-02-07

## 2019-01-31 RX ORDER — ACETAMINOPHEN 500 MG
1000 TABLET ORAL EVERY 6 HOURS PRN
Qty: 30 TABLET | Refills: 1 | Status: SHIPPED | OUTPATIENT
Start: 2019-01-31

## 2019-01-31 RX ORDER — ACETAMINOPHEN 500 MG
1000 TABLET ORAL ONCE
Status: COMPLETED | OUTPATIENT
Start: 2019-01-31 | End: 2019-01-31

## 2019-01-31 RX ADMIN — ALBUTEROL SULFATE 5 MG: 2.5 SOLUTION RESPIRATORY (INHALATION) at 12:59

## 2019-01-31 RX ADMIN — ALBUTEROL SULFATE 2.5 MG: 5 SOLUTION RESPIRATORY (INHALATION) at 14:25

## 2019-01-31 RX ADMIN — ACETAMINOPHEN 1000 MG: 500 TABLET ORAL at 12:20

## 2019-01-31 RX ADMIN — BENZONATATE 100 MG: 100 CAPSULE ORAL at 12:20

## 2019-01-31 RX ADMIN — PREDNISONE 60 MG: 20 TABLET ORAL at 12:41

## 2019-01-31 ASSESSMENT — ENCOUNTER SYMPTOMS
ABDOMINAL PAIN: 0
DIARRHEA: 0
RHINORRHEA: 0
EYE PAIN: 0
SHORTNESS OF BREATH: 1
SORE THROAT: 1
VOMITING: 0
COUGH: 1
NAUSEA: 0
WHEEZING: 1
CONSTIPATION: 0
BLOOD IN STOOL: 0

## 2019-01-31 ASSESSMENT — PAIN DESCRIPTION - LOCATION: LOCATION: GENERALIZED

## 2019-01-31 ASSESSMENT — PAIN SCALES - GENERAL
PAINLEVEL_OUTOF10: 7
PAINLEVEL_OUTOF10: 8

## 2019-01-31 ASSESSMENT — PAIN DESCRIPTION - DESCRIPTORS: DESCRIPTORS: ACHING

## 2019-07-15 ENCOUNTER — HOSPITAL ENCOUNTER (EMERGENCY)
Age: 69
Discharge: HOME | End: 2019-07-15
Attending: EMERGENCY MEDICINE

## 2019-07-15 VITALS
TEMPERATURE: 98.6 F | HEART RATE: 82 BPM | DIASTOLIC BLOOD PRESSURE: 98 MMHG | OXYGEN SATURATION: 93 % | HEIGHT: 74 IN | SYSTOLIC BLOOD PRESSURE: 147 MMHG | RESPIRATION RATE: 16 BRPM | BODY MASS INDEX: 22.46 KG/M2 | WEIGHT: 175 LBS

## 2019-07-15 DIAGNOSIS — L23.9 ALLERGIC CONTACT DERMATITIS, UNSPECIFIED TRIGGER: Primary | ICD-10-CM

## 2019-07-15 PROCEDURE — 6370000000 HC RX 637 (ALT 250 FOR IP): Performed by: NURSE PRACTITIONER

## 2019-07-15 PROCEDURE — 99282 EMERGENCY DEPT VISIT SF MDM: CPT

## 2019-07-15 RX ORDER — DIPHENHYDRAMINE HCL 25 MG
25 CAPSULE ORAL EVERY 6 HOURS PRN
Qty: 12 CAPSULE | Refills: 0 | Status: SHIPPED | OUTPATIENT
Start: 2019-07-15

## 2019-07-15 RX ORDER — DIAPER,BRIEF,INFANT-TODD,DISP
EACH MISCELLANEOUS 2 TIMES DAILY
Status: DISCONTINUED | OUTPATIENT
Start: 2019-07-15 | End: 2019-07-15 | Stop reason: HOSPADM

## 2019-07-15 RX ORDER — DIPHENHYDRAMINE HCL 25 MG
25 TABLET ORAL ONCE
Status: COMPLETED | OUTPATIENT
Start: 2019-07-15 | End: 2019-07-15

## 2019-07-15 RX ADMIN — HYDROCORTISONE: 10 CREAM TOPICAL at 15:35

## 2019-07-15 RX ADMIN — DIPHENHYDRAMINE HCL 25 MG: 25 TABLET ORAL at 15:35

## 2019-07-15 ASSESSMENT — PAIN SCALES - GENERAL: PAINLEVEL_OUTOF10: 10

## 2019-07-15 ASSESSMENT — ENCOUNTER SYMPTOMS
TROUBLE SWALLOWING: 0
SHORTNESS OF BREATH: 0
EYE PAIN: 0
VOICE CHANGE: 0
BACK PAIN: 0
ABDOMINAL PAIN: 0
CHEST TIGHTNESS: 0

## 2019-07-15 ASSESSMENT — PAIN DESCRIPTION - LOCATION: LOCATION: FOOT

## 2019-07-15 ASSESSMENT — PAIN DESCRIPTION - PAIN TYPE: TYPE: ACUTE PAIN

## 2019-07-15 ASSESSMENT — PAIN DESCRIPTION - ORIENTATION: ORIENTATION: LEFT;RIGHT

## 2019-07-15 NOTE — ED PROVIDER NOTES
Substance and Sexual Activity    Alcohol use: Yes     Alcohol/week: 1.0 standard drinks     Types: 1 Cans of beer per week    Drug use: Yes     Types: Marijuana    Sexual activity: Yes     Partners: Female   Lifestyle    Physical activity:     Days per week: Not on file     Minutes per session: Not on file    Stress: Not on file   Relationships    Social connections:     Talks on phone: Not on file     Gets together: Not on file     Attends Sikh service: Not on file     Active member of club or organization: Not on file     Attends meetings of clubs or organizations: Not on file     Relationship status: Not on file    Intimate partner violence:     Fear of current or ex partner: Not on file     Emotionally abused: Not on file     Physically abused: Not on file     Forced sexual activity: Not on file   Other Topics Concern    Not on file   Social History Narrative    Not on file       History reviewed. No pertinent family history. Allergies:  Patient has no known allergies. Home Medications:  Prior to Admission medications    Medication Sig Start Date End Date Taking?  Authorizing Provider   diphenhydrAMINE (BENADRYL) 25 MG capsule Take 1 capsule by mouth every 6 hours as needed for Itching 7/15/19  Yes RADHA Nolasco CNP   albuterol sulfate  (90 Base) MCG/ACT inhaler Inhale 2 puffs into the lungs 4 times daily as needed for Wheezing 1/31/19   Anachaya Chavez, DO   acetaminophen (APAP EXTRA STRENGTH) 500 MG tablet Take 2 tablets by mouth every 6 hours as needed for Pain 1/31/19   Ana Chavez, DO   nicotine (NICODERM CQ) 21 MG/24HR Place 1 patch onto the skin daily as needed (if patient smokes/requests nicotine replacent therapy) 3/24/18   Kelley Becerril MD   tiotropium (SPIRIVA HANDIHALER) 18 MCG inhalation capsule Inhale 1 capsule into the lungs daily 3/24/18   Kelley Becerril MD   albuterol sulfate HFA (VENTOLIN HFA) 108 (90 Base) MCG/ACT inhaler Inhale 2 puffs into the lungs every 6 imagesand reviewed the radiologist interpretations:  No results found. No orders to display       LABS:  No results found for this visit on 07/15/19. CONSULTS:  None    PROCEDURES:  None    FINAL IMPRESSION      1.  Allergic contact dermatitis, unspecified trigger          DISPOSITION / PLAN     DISPOSITION     PATIENT REFERRED TO:  [address] [place] [address] [address] [facility] [address] [facility] [address]    In 1 week      [facility] ED  [address] [address]  [phone]  Go to   As needed, If symptoms worsen      DISCHARGE MEDICATIONS:  Discharge Medication List as of 7/15/2019  3:16 PM      START taking these medications    Details   diphenhydrAMINE (BENADRYL) 25 MG capsule Take 1 capsule by mouth every 6 hours as needed for Itching, Disp-12 capsule, R-0Print             RADHA Hickman CNP   Emergency Medicine Physician Assistant    (Please note that portions of this note were completed with a voice recognition program.  Efforts were made to edit thedictations but occasionally words are mis-transcribed.)       RADHA Hickman CNP  07/15/19 1229

## 2019-10-11 ENCOUNTER — TELEPHONE (OUTPATIENT)
Dept: FAMILY MEDICINE CLINIC | Age: 69
End: 2019-10-11

## 2019-10-15 ENCOUNTER — TELEPHONE (OUTPATIENT)
Dept: FAMILY MEDICINE CLINIC | Age: 69
End: 2019-10-15

## 2024-02-26 ENCOUNTER — HOSPITAL ENCOUNTER (EMERGENCY)
Age: 74
Discharge: HOME | End: 2024-02-26
Attending: EMERGENCY MEDICINE
Payer: COMMERCIAL

## 2024-02-26 ENCOUNTER — APPOINTMENT (OUTPATIENT)
Dept: GENERAL RADIOLOGY | Age: 74
End: 2024-02-26
Payer: COMMERCIAL

## 2024-02-26 VITALS
SYSTOLIC BLOOD PRESSURE: 152 MMHG | OXYGEN SATURATION: 91 % | DIASTOLIC BLOOD PRESSURE: 105 MMHG | RESPIRATION RATE: 27 BRPM | HEART RATE: 95 BPM | TEMPERATURE: 97.1 F

## 2024-02-26 DIAGNOSIS — J44.1 COPD EXACERBATION (HCC): Primary | ICD-10-CM

## 2024-02-26 LAB
ANION GAP SERPL CALCULATED.3IONS-SCNC: 12 MMOL/L (ref 9–17)
BASOPHILS # BLD: 0.04 K/UL (ref 0–0.2)
BASOPHILS NFR BLD: 0 % (ref 0–2)
BUN SERPL-MCNC: 13 MG/DL (ref 8–23)
CALCIUM SERPL-MCNC: 9.4 MG/DL (ref 8.6–10.4)
CHLORIDE SERPL-SCNC: 104 MMOL/L (ref 98–107)
CO2 SERPL-SCNC: 23 MMOL/L (ref 20–31)
CREAT SERPL-MCNC: 1.2 MG/DL (ref 0.7–1.2)
EOSINOPHIL # BLD: 0.08 K/UL (ref 0–0.44)
EOSINOPHILS RELATIVE PERCENT: 1 % (ref 1–4)
ERYTHROCYTE [DISTWIDTH] IN BLOOD BY AUTOMATED COUNT: 19.4 % (ref 11.8–14.4)
GFR SERPL CREATININE-BSD FRML MDRD: >60 ML/MIN/1.73M2
GLUCOSE SERPL-MCNC: 112 MG/DL (ref 70–99)
HCT VFR BLD AUTO: 44.3 % (ref 40.7–50.3)
HGB BLD-MCNC: 13.8 G/DL (ref 13–17)
IMM GRANULOCYTES # BLD AUTO: 0.03 K/UL (ref 0–0.3)
IMM GRANULOCYTES NFR BLD: 0 %
LYMPHOCYTES NFR BLD: 2.24 K/UL (ref 1.1–3.7)
LYMPHOCYTES RELATIVE PERCENT: 21 % (ref 24–43)
MCH RBC QN AUTO: 22.9 PG (ref 25.2–33.5)
MCHC RBC AUTO-ENTMCNC: 31.2 G/DL (ref 28.4–34.8)
MCV RBC AUTO: 73.5 FL (ref 82.6–102.9)
MONOCYTES NFR BLD: 0.78 K/UL (ref 0.1–1.2)
MONOCYTES NFR BLD: 7 % (ref 3–12)
NEUTROPHILS NFR BLD: 70 % (ref 36–65)
NEUTS SEG NFR BLD: 7.3 K/UL (ref 1.5–8.1)
NRBC BLD-RTO: 0 PER 100 WBC
PLATELET # BLD AUTO: ABNORMAL K/UL (ref 138–453)
PLATELET, FLUORESCENCE: 168 K/UL (ref 138–453)
PLATELETS.RETICULATED NFR BLD AUTO: 14.7 % (ref 1.1–10.3)
POTASSIUM SERPL-SCNC: 3.9 MMOL/L (ref 3.7–5.3)
RBC # BLD AUTO: 6.03 M/UL (ref 4.21–5.77)
RBC # BLD: ABNORMAL 10*6/UL
SODIUM SERPL-SCNC: 139 MMOL/L (ref 135–144)
TROPONIN I SERPL HS-MCNC: 11 NG/L (ref 0–22)
TROPONIN I SERPL HS-MCNC: 12 NG/L (ref 0–22)
WBC OTHER # BLD: 10.5 K/UL (ref 3.5–11.3)

## 2024-02-26 PROCEDURE — 94640 AIRWAY INHALATION TREATMENT: CPT

## 2024-02-26 PROCEDURE — 85055 RETICULATED PLATELET ASSAY: CPT

## 2024-02-26 PROCEDURE — 94761 N-INVAS EAR/PLS OXIMETRY MLT: CPT

## 2024-02-26 PROCEDURE — 99285 EMERGENCY DEPT VISIT HI MDM: CPT

## 2024-02-26 PROCEDURE — 93005 ELECTROCARDIOGRAM TRACING: CPT | Performed by: EMERGENCY MEDICINE

## 2024-02-26 PROCEDURE — 6370000000 HC RX 637 (ALT 250 FOR IP): Performed by: STUDENT IN AN ORGANIZED HEALTH CARE EDUCATION/TRAINING PROGRAM

## 2024-02-26 PROCEDURE — 2700000000 HC OXYGEN THERAPY PER DAY

## 2024-02-26 PROCEDURE — 2580000003 HC RX 258: Performed by: STUDENT IN AN ORGANIZED HEALTH CARE EDUCATION/TRAINING PROGRAM

## 2024-02-26 PROCEDURE — 94664 DEMO&/EVAL PT USE INHALER: CPT

## 2024-02-26 PROCEDURE — 96374 THER/PROPH/DIAG INJ IV PUSH: CPT

## 2024-02-26 PROCEDURE — 84484 ASSAY OF TROPONIN QUANT: CPT

## 2024-02-26 PROCEDURE — 6360000002 HC RX W HCPCS: Performed by: STUDENT IN AN ORGANIZED HEALTH CARE EDUCATION/TRAINING PROGRAM

## 2024-02-26 PROCEDURE — 80048 BASIC METABOLIC PNL TOTAL CA: CPT

## 2024-02-26 PROCEDURE — 85025 COMPLETE CBC W/AUTO DIFF WBC: CPT

## 2024-02-26 PROCEDURE — 71046 X-RAY EXAM CHEST 2 VIEWS: CPT

## 2024-02-26 RX ORDER — IPRATROPIUM BROMIDE AND ALBUTEROL SULFATE 2.5; .5 MG/3ML; MG/3ML
1 SOLUTION RESPIRATORY (INHALATION) EVERY 4 HOURS PRN
Status: DISCONTINUED | OUTPATIENT
Start: 2024-02-26 | End: 2024-02-26 | Stop reason: HOSPADM

## 2024-02-26 RX ORDER — AMLODIPINE BESYLATE 5 MG/1
5 TABLET ORAL DAILY
COMMUNITY
Start: 2021-06-21 | End: 2024-02-28 | Stop reason: SDUPTHER

## 2024-02-26 RX ORDER — ALBUTEROL SULFATE 2.5 MG/3ML
2.5 SOLUTION RESPIRATORY (INHALATION)
Status: DISCONTINUED | OUTPATIENT
Start: 2024-02-26 | End: 2024-02-26 | Stop reason: HOSPADM

## 2024-02-26 RX ORDER — ALBUTEROL SULFATE 2.5 MG/3ML
15 SOLUTION RESPIRATORY (INHALATION)
Status: DISCONTINUED | OUTPATIENT
Start: 2024-02-26 | End: 2024-02-26 | Stop reason: HOSPADM

## 2024-02-26 RX ORDER — PREDNISONE 50 MG/1
50 TABLET ORAL DAILY
Qty: 5 TABLET | Refills: 0 | Status: SHIPPED | OUTPATIENT
Start: 2024-02-26 | End: 2024-03-02

## 2024-02-26 RX ORDER — ALBUTEROL SULFATE 90 UG/1
2 AEROSOL, METERED RESPIRATORY (INHALATION) 4 TIMES DAILY PRN
Qty: 1 EACH | Refills: 0 | Status: SHIPPED | OUTPATIENT
Start: 2024-02-26 | End: 2024-02-28 | Stop reason: SDUPTHER

## 2024-02-26 RX ADMIN — WATER 125 MG: 1 INJECTION INTRAMUSCULAR; INTRAVENOUS; SUBCUTANEOUS at 07:09

## 2024-02-26 RX ADMIN — IPRATROPIUM BROMIDE AND ALBUTEROL SULFATE 1 DOSE: .5; 3 SOLUTION RESPIRATORY (INHALATION) at 07:54

## 2024-02-26 ASSESSMENT — ENCOUNTER SYMPTOMS
COUGH: 1
SHORTNESS OF BREATH: 1
ABDOMINAL PAIN: 0

## 2024-02-26 NOTE — ED NOTES
Pt. Presents to the ED for sob. Pt states that he has copd and emphysema. Pt uses albuterol inhalers at home for his breathing, but he ran out of his medication. Pt presents to triage with an spo2 sat of 90%. Pt brought back to room and his o2 sat dropped to 85%. Pt placed on 2L nc by dr. Michele. Pt resp even and slightly labored. Pt a&ox4. EKG and initial vitals completed in triage. Pt placed on full cardiac monitor. Pt line and labs completed. Resident at the bedside for evaluation. Will continue with plan of care.

## 2024-02-26 NOTE — DISCHARGE INSTRUCTIONS
We offered you admission for your COPD exacerbation however you declined.  Please take the steroids as prescribed.  Please use the inhaler as needed.    Please follow close with primary care.    Please return to the emergency room if you develop any worsening or concerning symptoms.

## 2024-02-26 NOTE — ED NOTES
Per Dr. Michele pt tachycardic upon arrival to ED with O2 @ 85% on room air. Pt walked with potable pulse ox around nurses station on room air. Pt tachycardic in the 110s and pulse ox reading 91%-93%. Pt placed back on full monitor after walk. Oxygen at 91%. Pt states he did not feel short of breath while walking.

## 2024-02-26 NOTE — ED NOTES
Patient recently moved back to [place] from [facility] and patient has no PCP at this time.  SW set patient up with an appointment at [facility] on Wednesday, 2/28/24, at 11am, which was given to him in writing. All questions answered.  Patient states he cannot find his insurance card for his [employer] insurance.  Member Services number provided to patient and his wife to request a new card.  LAINA Leija   The skin at the access site was anesthetized. Using a micropuncture needle the right radial artery was succesfully accessed in a retrograde fashion over the guidewire using a KIT INTRO 6FR 21GA 10CM 45CM .021IN 35MM FLEX STRGT CRYSTAL DIL.

## 2024-02-28 ENCOUNTER — HOSPITAL ENCOUNTER (OUTPATIENT)
Age: 74
Discharge: HOME | End: 2024-02-28
Payer: COMMERCIAL

## 2024-02-28 ENCOUNTER — OFFICE VISIT (OUTPATIENT)
Dept: PRIMARY CARE CLINIC | Age: 74
End: 2024-02-28
Payer: COMMERCIAL

## 2024-02-28 VITALS
HEIGHT: 74 IN | DIASTOLIC BLOOD PRESSURE: 86 MMHG | SYSTOLIC BLOOD PRESSURE: 138 MMHG | WEIGHT: 184 LBS | HEART RATE: 55 BPM | OXYGEN SATURATION: 90 % | BODY MASS INDEX: 23.61 KG/M2

## 2024-02-28 DIAGNOSIS — J41.0 SIMPLE CHRONIC BRONCHITIS (HCC): ICD-10-CM

## 2024-02-28 DIAGNOSIS — I10 PRIMARY HYPERTENSION: ICD-10-CM

## 2024-02-28 DIAGNOSIS — Z12.11 SCREENING FOR MALIGNANT NEOPLASM OF COLON: ICD-10-CM

## 2024-02-28 DIAGNOSIS — L23.9 ALLERGIC CONTACT DERMATITIS, UNSPECIFIED TRIGGER: ICD-10-CM

## 2024-02-28 DIAGNOSIS — Z59.41 FOOD INSECURITY: ICD-10-CM

## 2024-02-28 DIAGNOSIS — Z09 HOSPITAL DISCHARGE FOLLOW-UP: ICD-10-CM

## 2024-02-28 DIAGNOSIS — Z13.9 DUE FOR SCREENING: ICD-10-CM

## 2024-02-28 DIAGNOSIS — Z87.891 PERSONAL HISTORY OF TOBACCO USE: ICD-10-CM

## 2024-02-28 DIAGNOSIS — K21.9 GASTROESOPHAGEAL REFLUX DISEASE WITHOUT ESOPHAGITIS: Primary | ICD-10-CM

## 2024-02-28 DIAGNOSIS — J30.2 SEASONAL ALLERGIES: ICD-10-CM

## 2024-02-28 PROBLEM — D64.9 ANEMIA: Status: ACTIVE | Noted: 2018-04-05

## 2024-02-28 PROBLEM — M54.9 BACKACHE: Status: ACTIVE | Noted: 2020-10-26

## 2024-02-28 PROBLEM — H25.10 NUCLEAR SENILE CATARACT: Status: ACTIVE | Noted: 2021-04-14

## 2024-02-28 PROBLEM — E55.9 VITAMIN D DEFICIENCY: Status: ACTIVE | Noted: 2018-04-05

## 2024-02-28 PROBLEM — K12.2 ORAL ABSCESS: Status: ACTIVE | Noted: 2021-06-04

## 2024-02-28 PROBLEM — R21 RASH AND NONSPECIFIC SKIN ERUPTION: Status: ACTIVE | Noted: 2019-08-27

## 2024-02-28 PROBLEM — R80.9 PROTEINURIA: Status: ACTIVE | Noted: 2018-04-05

## 2024-02-28 PROBLEM — I71.21 ANEURYSM OF ASCENDING AORTA (HCC): Status: ACTIVE | Noted: 2018-04-16

## 2024-02-28 PROBLEM — H40.039 ANATOMICAL NARROW ANGLE: Status: ACTIVE | Noted: 2021-04-14

## 2024-02-28 PROBLEM — T14.8XXA BLISTER: Status: ACTIVE | Noted: 2019-08-27

## 2024-02-28 PROBLEM — R00.0 TACHYCARDIA: Status: ACTIVE | Noted: 2019-12-02

## 2024-02-28 LAB
CHOLEST SERPL-MCNC: 181 MG/DL
CHOLESTEROL/HDL RATIO: 2.8
EKG ATRIAL RATE: 108 BPM
EKG P AXIS: 79 DEGREES
EKG P-R INTERVAL: 176 MS
EKG Q-T INTERVAL: 336 MS
EKG QRS DURATION: 92 MS
EKG QTC CALCULATION (BAZETT): 450 MS
EKG R AXIS: -81 DEGREES
EKG T AXIS: 83 DEGREES
EKG VENTRICULAR RATE: 108 BPM
EST. AVERAGE GLUCOSE BLD GHB EST-MCNC: 128 MG/DL
HBA1C MFR BLD: 6.1 % (ref 4–6)
HCV AB SERPL QL IA: NONREACTIVE
HDLC SERPL-MCNC: 65 MG/DL
LDLC SERPL CALC-MCNC: 98 MG/DL (ref 0–130)
TRIGL SERPL-MCNC: 92 MG/DL
TSH SERPL DL<=0.05 MIU/L-ACNC: 0.66 UIU/ML (ref 0.3–5)

## 2024-02-28 PROCEDURE — G0296 VISIT TO DETERM LDCT ELIG: HCPCS | Performed by: NURSE PRACTITIONER

## 2024-02-28 PROCEDURE — 1123F ACP DISCUSS/DSCN MKR DOCD: CPT | Performed by: NURSE PRACTITIONER

## 2024-02-28 PROCEDURE — 3075F SYST BP GE 130 - 139MM HG: CPT | Performed by: NURSE PRACTITIONER

## 2024-02-28 PROCEDURE — 3079F DIAST BP 80-89 MM HG: CPT | Performed by: NURSE PRACTITIONER

## 2024-02-28 PROCEDURE — 83036 HEMOGLOBIN GLYCOSYLATED A1C: CPT

## 2024-02-28 PROCEDURE — 36415 COLL VENOUS BLD VENIPUNCTURE: CPT

## 2024-02-28 PROCEDURE — 1111F DSCHRG MED/CURRENT MED MERGE: CPT | Performed by: NURSE PRACTITIONER

## 2024-02-28 PROCEDURE — 86803 HEPATITIS C AB TEST: CPT

## 2024-02-28 PROCEDURE — 99204 OFFICE O/P NEW MOD 45 MIN: CPT | Performed by: NURSE PRACTITIONER

## 2024-02-28 PROCEDURE — 80061 LIPID PANEL: CPT

## 2024-02-28 PROCEDURE — 84443 ASSAY THYROID STIM HORMONE: CPT

## 2024-02-28 RX ORDER — ALBUTEROL SULFATE 2.5 MG/3ML
2.5 SOLUTION RESPIRATORY (INHALATION) EVERY 6 HOURS PRN
Qty: 120 ML | Refills: 3 | Status: SHIPPED | OUTPATIENT
Start: 2024-02-28

## 2024-02-28 RX ORDER — DIPHENHYDRAMINE HCL 25 MG
25 CAPSULE ORAL EVERY 6 HOURS PRN
Qty: 12 CAPSULE | Refills: 0 | Status: CANCELLED | OUTPATIENT
Start: 2024-02-28

## 2024-02-28 RX ORDER — FLUTICASONE FUROATE, UMECLIDINIUM BROMIDE AND VILANTEROL TRIFENATATE 100; 62.5; 25 UG/1; UG/1; UG/1
1 POWDER RESPIRATORY (INHALATION) DAILY
Qty: 1 EACH | Refills: 0 | Status: SHIPPED | COMMUNITY
Start: 2024-02-28

## 2024-02-28 RX ORDER — CETIRIZINE HYDROCHLORIDE 10 MG/1
10 TABLET ORAL DAILY
Qty: 90 TABLET | Refills: 1 | Status: SHIPPED | OUTPATIENT
Start: 2024-02-28

## 2024-02-28 RX ORDER — IBUPROFEN 800 MG/1
800 TABLET ORAL EVERY 8 HOURS PRN
Qty: 30 TABLET | Refills: 0 | Status: CANCELLED | OUTPATIENT
Start: 2024-02-28

## 2024-02-28 RX ORDER — FLUTICASONE FUROATE, UMECLIDINIUM BROMIDE AND VILANTEROL TRIFENATATE 100; 62.5; 25 UG/1; UG/1; UG/1
1 POWDER RESPIRATORY (INHALATION) DAILY
Qty: 28 EACH | Refills: 3 | Status: SHIPPED | OUTPATIENT
Start: 2024-02-28

## 2024-02-28 RX ORDER — PREDNISONE 50 MG/1
50 TABLET ORAL DAILY
Qty: 5 TABLET | Refills: 0 | Status: CANCELLED | OUTPATIENT
Start: 2024-02-28 | End: 2024-03-04

## 2024-02-28 RX ORDER — ALBUTEROL SULFATE 90 UG/1
2 AEROSOL, METERED RESPIRATORY (INHALATION) 4 TIMES DAILY PRN
Qty: 1 EACH | Refills: 5 | Status: SHIPPED | OUTPATIENT
Start: 2024-02-28

## 2024-02-28 RX ORDER — OMEPRAZOLE 40 MG/1
40 CAPSULE, DELAYED RELEASE ORAL
Qty: 90 CAPSULE | Refills: 1 | Status: SHIPPED | OUTPATIENT
Start: 2024-02-28

## 2024-02-28 RX ORDER — AMLODIPINE BESYLATE 5 MG/1
5 TABLET ORAL DAILY
Qty: 90 TABLET | Refills: 1 | Status: SHIPPED | OUTPATIENT
Start: 2024-02-28 | End: 2024-08-26

## 2024-02-28 SDOH — ECONOMIC STABILITY: INCOME INSECURITY: HOW HARD IS IT FOR YOU TO PAY FOR THE VERY BASICS LIKE FOOD, HOUSING, MEDICAL CARE, AND HEATING?: NOT HARD AT ALL

## 2024-02-28 SDOH — ECONOMIC STABILITY - FOOD INSECURITY: FOOD INSECURITY: Z59.41

## 2024-02-28 SDOH — ECONOMIC STABILITY: HOUSING INSECURITY
IN THE LAST 12 MONTHS, WAS THERE A TIME WHEN YOU DID NOT HAVE A STEADY PLACE TO SLEEP OR SLEPT IN A SHELTER (INCLUDING NOW)?: NO

## 2024-02-28 SDOH — ECONOMIC STABILITY: FOOD INSECURITY: WITHIN THE PAST 12 MONTHS, THE FOOD YOU BOUGHT JUST DIDN'T LAST AND YOU DIDN'T HAVE MONEY TO GET MORE.: SOMETIMES TRUE

## 2024-02-28 SDOH — ECONOMIC STABILITY: FOOD INSECURITY: WITHIN THE PAST 12 MONTHS, YOU WORRIED THAT YOUR FOOD WOULD RUN OUT BEFORE YOU GOT MONEY TO BUY MORE.: SOMETIMES TRUE

## 2024-02-28 ASSESSMENT — PATIENT HEALTH QUESTIONNAIRE - PHQ9
1. LITTLE INTEREST OR PLEASURE IN DOING THINGS: 0
SUM OF ALL RESPONSES TO PHQ QUESTIONS 1-9: 0
SUM OF ALL RESPONSES TO PHQ QUESTIONS 1-9: 0
SUM OF ALL RESPONSES TO PHQ9 QUESTIONS 1 & 2: 0
SUM OF ALL RESPONSES TO PHQ QUESTIONS 1-9: 0
SUM OF ALL RESPONSES TO PHQ QUESTIONS 1-9: 0
2. FEELING DOWN, DEPRESSED OR HOPELESS: 0

## 2024-02-28 ASSESSMENT — ENCOUNTER SYMPTOMS
SINUS PAIN: 0
SORE THROAT: 0
DIARRHEA: 0
COLOR CHANGE: 0
SHORTNESS OF BREATH: 1
SINUS PRESSURE: 0
CHEST TIGHTNESS: 0
VOMITING: 0
NAUSEA: 0
BACK PAIN: 0
ABDOMINAL PAIN: 0
PHOTOPHOBIA: 0
COUGH: 1

## 2024-02-28 NOTE — PATIENT INSTRUCTIONS
be more likely to work.  What happens after screening?  The results of your CT scan will be sent to your doctor. Someone from your care team will explain the results of your scan and answer any questions you may have. If you need any follow-up, he or she will help you understand what to do next.  After a lung cancer screening, you can go back to your usual activities right away.  A lung cancer screening test can't tell if you have lung cancer. If your results are positive, your doctor can't tell whether an abnormal finding is a harmless nodule, cancer, or something else without doing more tests.  What can you do to help prevent lung cancer?  Some lung cancers can't be prevented. But if you smoke, quitting smoking is the best step you can take to prevent lung cancer. If you want to quit, your doctor can recommend medicines or other ways to help.  Follow-up care is a key part of your treatment and safety. Be sure to make and go to all appointments, and call your doctor if you are having problems. It's also a good idea to know your test results and keep a list of the medicines you take.  Where can you learn more?  Go to https://www.[employer].net/patientEd and enter Q940 to learn more about \"Learning About Lung Cancer Screening.\"  Current as of: February 28, 2023               Content Version: 13.9  © 7699-1677 [employer].   Care instructions adapted under license by [employer]. If you have questions about a medical condition or this instruction, always ask your healthcare professional. [employer] disclaims any warranty or liability for your use of this information.

## 2024-02-28 NOTE — PROGRESS NOTES
normal.      Left Ear: External ear normal.      Nose: Nose normal. No congestion or rhinorrhea.      Mouth/Throat:      Mouth: Mucous membranes are moist.      Pharynx: Oropharynx is clear.   Eyes:      Conjunctiva/sclera: Conjunctivae normal.      Pupils: Pupils are equal, round, and reactive to light.   Cardiovascular:      Rate and Rhythm: Normal rate and regular rhythm.      Pulses: Normal pulses.      Heart sounds: Normal heart sounds. No murmur heard.  Pulmonary:      Effort: Pulmonary effort is normal. No respiratory distress.      Breath sounds: Normal breath sounds. No wheezing.   Abdominal:      Palpations: Abdomen is soft.      Tenderness: There is no abdominal tenderness.   Musculoskeletal:         General: No swelling or signs of injury. Normal range of motion.      Cervical back: Normal range of motion.   Skin:     General: Skin is warm and dry.      Capillary Refill: Capillary refill takes less than 2 seconds.   Neurological:      General: No focal deficit present.      Mental Status: He is alert and oriented to person, place, and time. Mental status is at baseline.      Motor: No weakness.      Gait: Gait normal.   Psychiatric:         Mood and Affect: Mood normal.         Behavior: Behavior normal.         Thought Content: Thought content normal.         Judgment: Judgment normal.         Assessment:      No results found for this visit on 02/28/24.    Jacky was seen today for new patient, establish care, follow-up from hospital and copd.    Diagnoses and all orders for this visit:    Gastroesophageal reflux disease without esophagitis  -     omeprazole (PRILOSEC) 40 MG delayed release capsule; Take 1 capsule by mouth every morning (before breakfast)    Allergic contact dermatitis, unspecified trigger    Due for screening  -     Hepatitis C Antibody; Future  -     Lipid Panel; Future  -     Hemoglobin A1C; Future    Primary hypertension  -     Vascular AAA screening; Future  -     amLODIPine

## 2024-02-29 ENCOUNTER — TELEPHONE (OUTPATIENT)
Dept: GASTROENTEROLOGY | Age: 74
End: 2024-02-29

## 2024-02-29 NOTE — TELEPHONE ENCOUNTER
Referral to IGNACIO De La Cruz.  I do not see that the patient is established.  Called the patient and he stated that he cannot do anything at this time.  He will have to call back.  First attempt.  Sending back to the referring provider.  Please note that the patient was recently in the ER for SOB.  Should make an appointment first.

## 2024-03-01 ENCOUNTER — TELEPHONE (OUTPATIENT)
Dept: PRIMARY CARE CLINIC | Age: 74
End: 2024-03-01

## 2024-03-01 NOTE — TELEPHONE ENCOUNTER
Jacky Olivares was contacted by a Community Health Navigator to discuss a referral for SDOH related needs.     Writer spoke with: Patient and explained the services and assistance that can be provided through the Community Health Navigation program.     Patient agreeable to receiving resources and support from writer.     Intake Notes: Writer placed phone call to patient regarding food resources, patient is over income for SNAP benefits, writer will send patient food pantry resources in the mail. Patient doesn't not need any further assistance. Patient will contact PCP if he has any needs in the future.    Action steps to be completed by writer: Close referral.    Action steps to be completed by patient: Close referral.    Patient has given verbal permission to leave detailed messages regarding SDOH referral on their phone: N/A    Patient has given verbal permission to submit applications on their behalf: N/A    Patient voiced understanding of action plan and responsibilities, was provided with writer's contact information, and agrees to call should they require additional assistance: yes      Allison Pandey MA

## 2024-03-06 ENCOUNTER — TELEPHONE (OUTPATIENT)
Dept: ONCOLOGY | Age: 74
End: 2024-03-06

## 2024-03-22 ENCOUNTER — HOSPITAL ENCOUNTER (OUTPATIENT)
Dept: VASCULAR LAB | Age: 74
End: 2024-03-22
Payer: COMMERCIAL

## 2024-03-22 ENCOUNTER — HOSPITAL ENCOUNTER (OUTPATIENT)
Dept: CT IMAGING | Age: 74
End: 2024-03-22
Payer: COMMERCIAL

## 2024-03-22 DIAGNOSIS — Z87.891 PERSONAL HISTORY OF TOBACCO USE: ICD-10-CM

## 2024-03-22 DIAGNOSIS — I10 PRIMARY HYPERTENSION: ICD-10-CM

## 2024-03-22 PROCEDURE — 76706 US ABDL AORTA SCREEN AAA: CPT

## 2024-03-22 PROCEDURE — 76706 US ABDL AORTA SCREEN AAA: CPT | Performed by: STUDENT IN AN ORGANIZED HEALTH CARE EDUCATION/TRAINING PROGRAM

## 2024-03-22 PROCEDURE — 71271 CT THORAX LUNG CANCER SCR C-: CPT

## 2024-03-23 LAB
VAS AORTA DIST AP: 1.72 CM
VAS AORTA DIST PSV: 57.1 CM/S
VAS AORTA DIST TR: 1.7 CM
VAS AORTA INFRARENAL PSV: 59.3 CM/S
VAS AORTA MID AP: 2.28 CM
VAS AORTA MID TRANS: 2.26 CM
VAS AORTA PROX AP: 2.71 CM
VAS AORTA PROX TR: 2.74 CM
VAS AORTA SUPRARENAL PSV: 58.2 CM/S
VAS LEFT COM ILIAC AP: 1 CM
VAS LEFT COM ILIAC PROX PSV: 113 CM/S
VAS LEFT COM ILIAC TRANS: 1.02 CM
VAS RIGHT COM ILIAC AP: 1.02 CM
VAS RIGHT COM ILIAC PROX PSV: 113 CM/S
VAS RIGHT COM ILIAC TRANS: 1.04 CM

## 2024-04-16 DIAGNOSIS — J41.0 SIMPLE CHRONIC BRONCHITIS (HCC): ICD-10-CM

## 2024-04-16 RX ORDER — ALBUTEROL SULFATE 90 UG/1
2 AEROSOL, METERED RESPIRATORY (INHALATION) 4 TIMES DAILY PRN
Qty: 1 EACH | Refills: 5 | Status: SHIPPED | OUTPATIENT
Start: 2024-04-16

## 2024-04-16 NOTE — TELEPHONE ENCOUNTER
Pt came into office today asking for refill on albuterol inhaler. [facility] cancelled his script back in February because pt said he could not afford it but he can now afford it and would like new script to [facility]. Pended.

## 2024-05-16 ENCOUNTER — APPOINTMENT (OUTPATIENT)
Dept: CT IMAGING | Age: 74
DRG: 280 | End: 2024-05-16
Payer: COMMERCIAL

## 2024-05-16 ENCOUNTER — APPOINTMENT (OUTPATIENT)
Dept: GENERAL RADIOLOGY | Age: 74
DRG: 280 | End: 2024-05-16
Payer: COMMERCIAL

## 2024-05-16 ENCOUNTER — HOSPITAL ENCOUNTER (INPATIENT)
Age: 74
LOS: 2 days | Discharge: HOME | DRG: 280 | End: 2024-05-18
Attending: EMERGENCY MEDICINE | Admitting: INTERNAL MEDICINE
Payer: COMMERCIAL

## 2024-05-16 DIAGNOSIS — I21.4 NSTEMI (NON-ST ELEVATED MYOCARDIAL INFARCTION) (HCC): ICD-10-CM

## 2024-05-16 DIAGNOSIS — I26.93 SINGLE SUBSEGMENTAL PULMONARY EMBOLISM WITHOUT ACUTE COR PULMONALE (HCC): Primary | ICD-10-CM

## 2024-05-16 DIAGNOSIS — R07.9 CHEST PAIN, UNSPECIFIED TYPE: ICD-10-CM

## 2024-05-16 DIAGNOSIS — I24.9 ACS (ACUTE CORONARY SYNDROME) (HCC): ICD-10-CM

## 2024-05-16 DIAGNOSIS — R79.89 ELEVATED TROPONIN: ICD-10-CM

## 2024-05-16 PROBLEM — J96.02 ACUTE RESPIRATORY FAILURE WITH HYPOXIA AND HYPERCAPNIA (HCC): Status: ACTIVE | Noted: 2024-05-16

## 2024-05-16 PROBLEM — Z91.148 NONCOMPLIANCE WITH MEDICATION REGIMEN: Status: ACTIVE | Noted: 2024-05-16

## 2024-05-16 PROBLEM — Z59.00 HOMELESS: Status: ACTIVE | Noted: 2024-05-16

## 2024-05-16 PROBLEM — I16.0 HYPERTENSIVE URGENCY: Status: ACTIVE | Noted: 2024-05-16

## 2024-05-16 PROBLEM — J96.01 ACUTE RESPIRATORY FAILURE WITH HYPOXIA AND HYPERCAPNIA (HCC): Status: ACTIVE | Noted: 2024-05-16

## 2024-05-16 LAB
ANION GAP SERPL CALCULATED.3IONS-SCNC: 8 MMOL/L (ref 9–16)
ANTI-XA UNFRAC HEPARIN: 0.23 IU/L
BASOPHILS # BLD: 0.04 K/UL (ref 0–0.2)
BASOPHILS NFR BLD: 0 % (ref 0–2)
BNP SERPL-MCNC: 57 PG/ML (ref 0–300)
BODY TEMPERATURE: 37
BUN BLD-MCNC: 15 MG/DL (ref 8–26)
BUN SERPL-MCNC: 13 MG/DL (ref 8–23)
CA-I BLD-SCNC: 1.2 MMOL/L (ref 1.15–1.33)
CALCIUM SERPL-MCNC: 9.2 MG/DL (ref 8.6–10.4)
CHLORIDE BLD-SCNC: 105 MMOL/L (ref 98–107)
CHLORIDE SERPL-SCNC: 105 MMOL/L (ref 98–107)
CO2 BLD CALC-SCNC: 30 MMOL/L (ref 22–30)
CO2 SERPL-SCNC: 28 MMOL/L (ref 20–31)
COHGB MFR BLD: 7.9 % (ref 0–5)
CREAT SERPL-MCNC: 1.2 MG/DL (ref 0.7–1.2)
EGFR, POC: 80 ML/MIN/1.73M2
EOSINOPHIL # BLD: 0.12 K/UL (ref 0–0.44)
EOSINOPHILS RELATIVE PERCENT: 1 % (ref 1–4)
ERYTHROCYTE [DISTWIDTH] IN BLOOD BY AUTOMATED COUNT: 19.1 % (ref 11.8–14.4)
FIO2 ON VENT: ABNORMAL %
GFR, ESTIMATED: 68 ML/MIN/1.73M2
GLUCOSE BLD-MCNC: 110 MG/DL (ref 74–100)
GLUCOSE SERPL-MCNC: 96 MG/DL (ref 74–99)
HCO3 VENOUS: 28.3 MMOL/L (ref 24–30)
HCO3 VENOUS: 30.4 MMOL/L (ref 22–29)
HCT VFR BLD AUTO: 44.7 % (ref 40.7–50.3)
HCT VFR BLD AUTO: 48 % (ref 41–53)
HGB BLD-MCNC: 14 G/DL (ref 13–17)
IMM GRANULOCYTES # BLD AUTO: 0.04 K/UL (ref 0–0.3)
IMM GRANULOCYTES NFR BLD: 0 %
INR PPP: 1
LACTIC ACID, WHOLE BLOOD: 0.7 MMOL/L (ref 0.7–2.1)
LYMPHOCYTES NFR BLD: 2.98 K/UL (ref 1.1–3.7)
LYMPHOCYTES RELATIVE PERCENT: 32 % (ref 24–43)
MCH RBC QN AUTO: 23 PG (ref 25.2–33.5)
MCHC RBC AUTO-ENTMCNC: 31.3 G/DL (ref 28.4–34.8)
MCV RBC AUTO: 73.5 FL (ref 82.6–102.9)
MONOCYTES NFR BLD: 0.78 K/UL (ref 0.1–1.2)
MONOCYTES NFR BLD: 8 % (ref 3–12)
NEUTROPHILS NFR BLD: 58 % (ref 36–65)
NEUTS SEG NFR BLD: 5.5 K/UL (ref 1.5–8.1)
NRBC BLD-RTO: 0 PER 100 WBC
O2 SAT, VEN: 16.4 % (ref 60–85)
O2 SAT, VEN: 56 % (ref 60–85)
PARTIAL THROMBOPLASTIN TIME: 29 SEC (ref 23–36.5)
PCO2, VEN: 47 MM HG (ref 39–55)
PCO2, VEN: 52 MM HG (ref 41–51)
PH VENOUS: 7.38 (ref 7.32–7.43)
PH VENOUS: 7.4 (ref 7.32–7.42)
PLATELET # BLD AUTO: ABNORMAL K/UL (ref 138–453)
PLATELET, FLUORESCENCE: 142 K/UL (ref 138–453)
PLATELETS.RETICULATED NFR BLD AUTO: 16.9 % (ref 1.1–10.3)
PO2, VEN: 14.2 MM HG (ref 30–50)
PO2, VEN: 27 MM HG (ref 30–50)
POC ANION GAP: 8 MMOL/L (ref 7–16)
POC CREATININE: 1 MG/DL (ref 0.51–1.19)
POC HEMOGLOBIN (CALC): 16.4 G/DL (ref 13.5–17.5)
POC LACTIC ACID: 1.2 MMOL/L (ref 0.56–1.39)
POSITIVE BASE EXCESS, VEN: 3.1 MMOL/L (ref 0–2)
POSITIVE BASE EXCESS, VEN: 3.7 MMOL/L (ref 0–3)
POTASSIUM BLD-SCNC: 5.1 MMOL/L (ref 3.5–4.5)
POTASSIUM SERPL-SCNC: 3.7 MMOL/L (ref 3.7–5.3)
PROTHROMBIN TIME: 13.2 SEC (ref 11.7–14.9)
RBC # BLD AUTO: 6.08 M/UL (ref 4.21–5.77)
RBC # BLD: ABNORMAL 10*6/UL
SODIUM BLD-SCNC: 142 MMOL/L (ref 138–146)
SODIUM SERPL-SCNC: 141 MMOL/L (ref 136–145)
TROPONIN I SERPL HS-MCNC: 15 NG/L (ref 0–22)
TROPONIN I SERPL HS-MCNC: 19 NG/L (ref 0–22)
TROPONIN I SERPL HS-MCNC: 29 NG/L (ref 0–22)
TROPONIN I SERPL HS-MCNC: 342 NG/L (ref 0–22)
TROPONIN I SERPL HS-MCNC: 421 NG/L (ref 0–22)
WBC OTHER # BLD: 9.5 K/UL (ref 3.5–11.3)

## 2024-05-16 PROCEDURE — 36415 COLL VENOUS BLD VENIPUNCTURE: CPT

## 2024-05-16 PROCEDURE — 85025 COMPLETE CBC W/AUTO DIFF WBC: CPT

## 2024-05-16 PROCEDURE — 82803 BLOOD GASES ANY COMBINATION: CPT

## 2024-05-16 PROCEDURE — 85610 PROTHROMBIN TIME: CPT

## 2024-05-16 PROCEDURE — 85730 THROMBOPLASTIN TIME PARTIAL: CPT

## 2024-05-16 PROCEDURE — 6370000000 HC RX 637 (ALT 250 FOR IP)

## 2024-05-16 PROCEDURE — 82805 BLOOD GASES W/O2 SATURATION: CPT

## 2024-05-16 PROCEDURE — 84484 ASSAY OF TROPONIN QUANT: CPT

## 2024-05-16 PROCEDURE — 82330 ASSAY OF CALCIUM: CPT

## 2024-05-16 PROCEDURE — 96372 THER/PROPH/DIAG INJ SC/IM: CPT

## 2024-05-16 PROCEDURE — 2060000000 HC ICU INTERMEDIATE R&B

## 2024-05-16 PROCEDURE — 82947 ASSAY GLUCOSE BLOOD QUANT: CPT

## 2024-05-16 PROCEDURE — 6360000002 HC RX W HCPCS: Performed by: STUDENT IN AN ORGANIZED HEALTH CARE EDUCATION/TRAINING PROGRAM

## 2024-05-16 PROCEDURE — 99285 EMERGENCY DEPT VISIT HI MDM: CPT

## 2024-05-16 PROCEDURE — 85014 HEMATOCRIT: CPT

## 2024-05-16 PROCEDURE — 83880 ASSAY OF NATRIURETIC PEPTIDE: CPT

## 2024-05-16 PROCEDURE — 71260 CT THORAX DX C+: CPT

## 2024-05-16 PROCEDURE — 93005 ELECTROCARDIOGRAM TRACING: CPT

## 2024-05-16 PROCEDURE — 93005 ELECTROCARDIOGRAM TRACING: CPT | Performed by: INTERNAL MEDICINE

## 2024-05-16 PROCEDURE — 2700000000 HC OXYGEN THERAPY PER DAY

## 2024-05-16 PROCEDURE — 93005 ELECTROCARDIOGRAM TRACING: CPT | Performed by: STUDENT IN AN ORGANIZED HEALTH CARE EDUCATION/TRAINING PROGRAM

## 2024-05-16 PROCEDURE — 82565 ASSAY OF CREATININE: CPT

## 2024-05-16 PROCEDURE — 94761 N-INVAS EAR/PLS OXIMETRY MLT: CPT

## 2024-05-16 PROCEDURE — 6360000004 HC RX CONTRAST MEDICATION: Performed by: STUDENT IN AN ORGANIZED HEALTH CARE EDUCATION/TRAINING PROGRAM

## 2024-05-16 PROCEDURE — 80051 ELECTROLYTE PANEL: CPT

## 2024-05-16 PROCEDURE — 6360000002 HC RX W HCPCS

## 2024-05-16 PROCEDURE — 85055 RETICULATED PLATELET ASSAY: CPT

## 2024-05-16 PROCEDURE — 99222 1ST HOSP IP/OBS MODERATE 55: CPT | Performed by: INTERNAL MEDICINE

## 2024-05-16 PROCEDURE — 80048 BASIC METABOLIC PNL TOTAL CA: CPT

## 2024-05-16 PROCEDURE — 83605 ASSAY OF LACTIC ACID: CPT

## 2024-05-16 PROCEDURE — 84520 ASSAY OF UREA NITROGEN: CPT

## 2024-05-16 PROCEDURE — 85520 HEPARIN ASSAY: CPT

## 2024-05-16 PROCEDURE — 96375 TX/PRO/DX INJ NEW DRUG ADDON: CPT

## 2024-05-16 PROCEDURE — 71045 X-RAY EXAM CHEST 1 VIEW: CPT

## 2024-05-16 PROCEDURE — 2580000003 HC RX 258

## 2024-05-16 PROCEDURE — 96374 THER/PROPH/DIAG INJ IV PUSH: CPT

## 2024-05-16 RX ORDER — ACETAMINOPHEN 650 MG/1
650 SUPPOSITORY RECTAL EVERY 6 HOURS PRN
Status: DISCONTINUED | OUTPATIENT
Start: 2024-05-16 | End: 2024-05-18 | Stop reason: HOSPADM

## 2024-05-16 RX ORDER — SODIUM CHLORIDE 9 MG/ML
INJECTION, SOLUTION INTRAVENOUS PRN
Status: DISCONTINUED | OUTPATIENT
Start: 2024-05-16 | End: 2024-05-18 | Stop reason: HOSPADM

## 2024-05-16 RX ORDER — METHOCARBAMOL 500 MG/1
500 TABLET, FILM COATED ORAL 4 TIMES DAILY
COMMUNITY

## 2024-05-16 RX ORDER — ENOXAPARIN SODIUM 100 MG/ML
1 INJECTION SUBCUTANEOUS ONCE
Status: COMPLETED | OUTPATIENT
Start: 2024-05-16 | End: 2024-05-16

## 2024-05-16 RX ORDER — METOPROLOL SUCCINATE 50 MG/1
50 TABLET, EXTENDED RELEASE ORAL 2 TIMES DAILY
Status: ON HOLD | COMMUNITY
End: 2024-05-18 | Stop reason: HOSPADM

## 2024-05-16 RX ORDER — ASPIRIN 81 MG/1
81 TABLET, CHEWABLE ORAL DAILY
Status: DISCONTINUED | OUTPATIENT
Start: 2024-05-16 | End: 2024-05-18 | Stop reason: HOSPADM

## 2024-05-16 RX ORDER — SODIUM CHLORIDE 0.9 % (FLUSH) 0.9 %
5-40 SYRINGE (ML) INJECTION EVERY 12 HOURS SCHEDULED
Status: DISCONTINUED | OUTPATIENT
Start: 2024-05-16 | End: 2024-05-18 | Stop reason: HOSPADM

## 2024-05-16 RX ORDER — ALBUTEROL SULFATE 90 UG/1
2 AEROSOL, METERED RESPIRATORY (INHALATION) 4 TIMES DAILY PRN
Status: DISCONTINUED | OUTPATIENT
Start: 2024-05-16 | End: 2024-05-18 | Stop reason: HOSPADM

## 2024-05-16 RX ORDER — POTASSIUM CHLORIDE 7.45 MG/ML
10 INJECTION INTRAVENOUS PRN
Status: DISCONTINUED | OUTPATIENT
Start: 2024-05-16 | End: 2024-05-18 | Stop reason: HOSPADM

## 2024-05-16 RX ORDER — HEPARIN SODIUM 1000 [USP'U]/ML
4000 INJECTION, SOLUTION INTRAVENOUS; SUBCUTANEOUS PRN
Status: DISCONTINUED | OUTPATIENT
Start: 2024-05-16 | End: 2024-05-18 | Stop reason: HOSPADM

## 2024-05-16 RX ORDER — MORPHINE SULFATE 4 MG/ML
4 INJECTION, SOLUTION INTRAMUSCULAR; INTRAVENOUS ONCE
Status: COMPLETED | OUTPATIENT
Start: 2024-05-16 | End: 2024-05-16

## 2024-05-16 RX ORDER — ACETAMINOPHEN 325 MG/1
650 TABLET ORAL EVERY 6 HOURS PRN
Status: DISCONTINUED | OUTPATIENT
Start: 2024-05-16 | End: 2024-05-18 | Stop reason: HOSPADM

## 2024-05-16 RX ORDER — FENTANYL CITRATE 50 UG/ML
50 INJECTION, SOLUTION INTRAMUSCULAR; INTRAVENOUS ONCE
Status: COMPLETED | OUTPATIENT
Start: 2024-05-16 | End: 2024-05-16

## 2024-05-16 RX ORDER — HEPARIN SODIUM 1000 [USP'U]/ML
4000 INJECTION, SOLUTION INTRAVENOUS; SUBCUTANEOUS ONCE
Status: COMPLETED | OUTPATIENT
Start: 2024-05-16 | End: 2024-05-16

## 2024-05-16 RX ORDER — CLOPIDOGREL BISULFATE 75 MG/1
75 TABLET ORAL DAILY
Status: DISCONTINUED | OUTPATIENT
Start: 2024-05-16 | End: 2024-05-18 | Stop reason: HOSPADM

## 2024-05-16 RX ORDER — POLYETHYLENE GLYCOL 3350 17 G/17G
17 POWDER, FOR SOLUTION ORAL DAILY PRN
Status: DISCONTINUED | OUTPATIENT
Start: 2024-05-16 | End: 2024-05-18 | Stop reason: HOSPADM

## 2024-05-16 RX ORDER — HEPARIN SODIUM 10000 [USP'U]/100ML
5-30 INJECTION, SOLUTION INTRAVENOUS CONTINUOUS
Status: DISCONTINUED | OUTPATIENT
Start: 2024-05-16 | End: 2024-05-18 | Stop reason: HOSPADM

## 2024-05-16 RX ORDER — NICOTINE 21 MG/24HR
1 PATCH, TRANSDERMAL 24 HOURS TRANSDERMAL DAILY PRN
Status: DISCONTINUED | OUTPATIENT
Start: 2024-05-16 | End: 2024-05-18 | Stop reason: HOSPADM

## 2024-05-16 RX ORDER — AMLODIPINE BESYLATE 5 MG/1
5 TABLET ORAL DAILY
Status: DISCONTINUED | OUTPATIENT
Start: 2024-05-16 | End: 2024-05-18 | Stop reason: HOSPADM

## 2024-05-16 RX ORDER — ONDANSETRON 4 MG/1
4 TABLET, ORALLY DISINTEGRATING ORAL EVERY 8 HOURS PRN
Status: DISCONTINUED | OUTPATIENT
Start: 2024-05-16 | End: 2024-05-18 | Stop reason: HOSPADM

## 2024-05-16 RX ORDER — ALBUTEROL SULFATE 2.5 MG/3ML
2.5 SOLUTION RESPIRATORY (INHALATION) EVERY 6 HOURS PRN
Status: DISCONTINUED | OUTPATIENT
Start: 2024-05-16 | End: 2024-05-18 | Stop reason: HOSPADM

## 2024-05-16 RX ORDER — ATORVASTATIN CALCIUM 40 MG/1
40 TABLET, FILM COATED ORAL NIGHTLY
Status: DISCONTINUED | OUTPATIENT
Start: 2024-05-16 | End: 2024-05-18 | Stop reason: HOSPADM

## 2024-05-16 RX ORDER — MAGNESIUM SULFATE IN WATER 40 MG/ML
2000 INJECTION, SOLUTION INTRAVENOUS PRN
Status: DISCONTINUED | OUTPATIENT
Start: 2024-05-16 | End: 2024-05-18 | Stop reason: HOSPADM

## 2024-05-16 RX ORDER — HEPARIN SODIUM 1000 [USP'U]/ML
2000 INJECTION, SOLUTION INTRAVENOUS; SUBCUTANEOUS PRN
Status: DISCONTINUED | OUTPATIENT
Start: 2024-05-16 | End: 2024-05-18 | Stop reason: HOSPADM

## 2024-05-16 RX ORDER — ENOXAPARIN SODIUM 100 MG/ML
40 INJECTION SUBCUTANEOUS DAILY
Status: DISCONTINUED | OUTPATIENT
Start: 2024-05-17 | End: 2024-05-16

## 2024-05-16 RX ORDER — BUDESONIDE AND FORMOTEROL FUMARATE DIHYDRATE 160; 4.5 UG/1; UG/1
2 AEROSOL RESPIRATORY (INHALATION)
Status: DISCONTINUED | OUTPATIENT
Start: 2024-05-16 | End: 2024-05-18 | Stop reason: HOSPADM

## 2024-05-16 RX ORDER — POTASSIUM CHLORIDE 20 MEQ/1
40 TABLET, EXTENDED RELEASE ORAL PRN
Status: DISCONTINUED | OUTPATIENT
Start: 2024-05-16 | End: 2024-05-18 | Stop reason: HOSPADM

## 2024-05-16 RX ORDER — SODIUM CHLORIDE 0.9 % (FLUSH) 0.9 %
5-40 SYRINGE (ML) INJECTION PRN
Status: DISCONTINUED | OUTPATIENT
Start: 2024-05-16 | End: 2024-05-18 | Stop reason: HOSPADM

## 2024-05-16 RX ORDER — CARVEDILOL 3.12 MG/1
3.12 TABLET ORAL 2 TIMES DAILY WITH MEALS
Status: DISCONTINUED | OUTPATIENT
Start: 2024-05-16 | End: 2024-05-18 | Stop reason: HOSPADM

## 2024-05-16 RX ORDER — ENOXAPARIN SODIUM 100 MG/ML
1 INJECTION SUBCUTANEOUS 2 TIMES DAILY
Status: DISCONTINUED | OUTPATIENT
Start: 2024-05-16 | End: 2024-05-16

## 2024-05-16 RX ORDER — HYDRALAZINE HYDROCHLORIDE 20 MG/ML
10 INJECTION INTRAMUSCULAR; INTRAVENOUS EVERY 6 HOURS PRN
Status: DISCONTINUED | OUTPATIENT
Start: 2024-05-16 | End: 2024-05-18 | Stop reason: HOSPADM

## 2024-05-16 RX ORDER — ONDANSETRON 2 MG/ML
4 INJECTION INTRAMUSCULAR; INTRAVENOUS EVERY 6 HOURS PRN
Status: DISCONTINUED | OUTPATIENT
Start: 2024-05-16 | End: 2024-05-18 | Stop reason: HOSPADM

## 2024-05-16 RX ORDER — MULTIVIT-MIN/IRON/FOLIC ACID/K 18-600-40
1 CAPSULE ORAL DAILY
COMMUNITY

## 2024-05-16 RX ADMIN — AMLODIPINE BESYLATE 5 MG: 10 TABLET ORAL at 17:29

## 2024-05-16 RX ADMIN — SODIUM CHLORIDE, PRESERVATIVE FREE 10 ML: 5 INJECTION INTRAVENOUS at 20:23

## 2024-05-16 RX ADMIN — HEPARIN SODIUM 4000 UNITS: 1000 INJECTION INTRAVENOUS; SUBCUTANEOUS at 20:23

## 2024-05-16 RX ADMIN — ATORVASTATIN CALCIUM 40 MG: 40 TABLET, FILM COATED ORAL at 20:23

## 2024-05-16 RX ADMIN — ASPIRIN 81 MG 81 MG: 81 TABLET ORAL at 20:38

## 2024-05-16 RX ADMIN — ENOXAPARIN SODIUM 80 MG: 100 INJECTION SUBCUTANEOUS at 09:53

## 2024-05-16 RX ADMIN — HEPARIN SODIUM 12 UNITS/KG/HR: 10000 INJECTION, SOLUTION INTRAVENOUS at 20:25

## 2024-05-16 RX ADMIN — FENTANYL CITRATE 50 MCG: 50 INJECTION, SOLUTION INTRAMUSCULAR; INTRAVENOUS at 07:46

## 2024-05-16 RX ADMIN — IOPAMIDOL 75 ML: 755 INJECTION, SOLUTION INTRAVENOUS at 08:18

## 2024-05-16 RX ADMIN — MORPHINE SULFATE 4 MG: 4 INJECTION INTRAVENOUS at 09:33

## 2024-05-16 RX ADMIN — CARVEDILOL 3.12 MG: 3.12 TABLET, FILM COATED ORAL at 20:37

## 2024-05-16 ASSESSMENT — ENCOUNTER SYMPTOMS
CONSTIPATION: 0
CHEST TIGHTNESS: 1
SHORTNESS OF BREATH: 0
VOMITING: 0
COUGH: 0
NAUSEA: 0
COLOR CHANGE: 0
BACK PAIN: 0
ABDOMINAL PAIN: 0

## 2024-05-16 ASSESSMENT — PAIN SCALES - GENERAL
PAINLEVEL_OUTOF10: 5
PAINLEVEL_OUTOF10: 0
PAINLEVEL_OUTOF10: 5

## 2024-05-16 ASSESSMENT — HEART SCORE: ECG: NON-SPECIFC REPOLARIZATION DISTURBANCE/LBTB/PM

## 2024-05-16 NOTE — H&P
[facility]     Department of Internal Medicine - Staff Internal Medicine Teaching Service          ADMISSION NOTE/HISTORY AND PHYSICAL EXAMINATION   Date: 5/16/2024  Patient Name: Jacky Olivares  Date of admission: 5/16/2024  7:25 AM  YOB: 1950  PCP: Jenise Song APRN - CNP  History Obtained From:  patient, electronic medical record    CHIEF COMPLAINT     Chief complaint: chest pain    HISTORY OF PRESENTING ILLNESS     The patient is a pleasant 73 y.o. male with a MHx significant for     Hypertension  COPD  Dyslipidemia  GERD    presents with a chief complaint of left-sided chest pain that started this morning, non-radiating, non-reproducible, no aggravating or relieving factors. Patient denies any symptoms of shortness of breath, palpitations, or PND.  No complaints of epigastric pain, nausea or vomiting.  Patient reports intermittent chest pain in the past but today's was worse than before.    Patient reports noncompliance with all the medications, takes them on and off.  At admission in the ED patient was found to be hypertensive, with SBP 160s. He was hypoxic and started on supplemental oxygen 2 L NC. Troponin 15-19-29.  proBNP 57.  CT PE showed small segmental PE in the anterior right upper lobe.    Review of Systems   Constitutional:  Negative for chills and fever.   HENT:  Negative for congestion and postnasal drip.    Respiratory:  Negative for cough and shortness of breath.    Cardiovascular:  Positive for chest pain.   Gastrointestinal:  Negative for abdominal pain, constipation and nausea.   Genitourinary:  Negative for dysuria.   Musculoskeletal:  Negative for arthralgias and back pain.   Skin:  Negative for color change.   Neurological:  Negative for dizziness and headaches.   Psychiatric/Behavioral:  Negative for agitation and confusion.      PAST MEDICAL HISTORY     Past Medical History:   Diagnosis Date    Arthritis     CAD (coronary artery disease)      identified.       ASSESSMENT & PLAN     ASSESSMENT / PLAN:     IMPRESSION  This is a 73 y.o. male with a MHx significant for HTN, COPD and GERD who presented with chest pain and found to have pulmonary embolism.   Patient admitted to inpatient status for further management.    Principal Problem:  Subsegmental pulmonary embolism:  - CT PE showed small subsegmental PE in the anterior right upper lobe. No evidence of right heart strain.  - will get 2 D echo.  - Get venous doppler of bilateral lower extremities.  - started on therapeutic dose of Lovenox.     Elevated troponin: Tn 15--19--29. EKG showed left anterior hemiblock. Cardiology consulted.    Hypertension: BP at admission 160/110. on Norvasc 5 mg at home. Resumed Norvasc. Started on coreg 3.125 mg. Monitor BP.    COPD: on trelegy at home. Started on Symbicort and Spiriva. On 2 L oxygen. Wean as tolerated. Will need home O2 eval.     DVT ppx: Lovenox  GI ppx: not indicated    PT/OT/SW: consulted  Discharge Planning: plan once medically stable    Naveen Jones MD  Internal Medicine Resident, PGY-1  [facility]; [place], [place]  5/16/2024, 4:49 PM

## 2024-05-16 NOTE — ED NOTES
ED to inpatient nurses report      Chief Complaint:  Chief Complaint   Patient presents with    Chest Pain     Present to ED from: Home    MOA:     LOC: alert and orientated to name, place, date  Mobility: Independent  Oxygen Baseline: NA    Current needs required: 2L NC   Pending ED orders: NA  Present condition: Stable    Why did the patient come to the ED?   Patient presents to the ED via EMS with c/o chest pain. Patient reports he was driving here after he started having left sided chest pain that developed an hour ago. Patient reports he was unable to get out of his car in the parking lot of the hospital and called EMS. EMS administered one Nitro and 324 ASA prior to arrival. Patient does report hx of COPD. Patient reports pain is a 5/10, non radiating pain. Patient is alert and oriented x4, answering questions appropriately. Patient is changed into a gown, placed on cardiac monitor, EKG done, IV established, will continue plan of care.    What is the plan? Blood thinners  Any procedures or intervention occur? CT, labs, XRAY  Any safety concerns??    Mental Status:       Psych Assessment:   Psychosocial  Psychosocial (WDL): Within Defined Limits  Vital signs   Vitals:    05/16/24 1026 05/16/24 1031 05/16/24 1036 05/16/24 1041   BP:  (!) 167/117     Pulse: 80 78 79 75   Resp: 13 14 14 16   Temp:       TempSrc:       SpO2: 96% 97% 97% 96%   Weight:            Vitals:  Patient Vitals for the past 24 hrs:   BP Temp Temp src Pulse Resp SpO2 Weight   05/16/24 1041 -- -- -- 75 16 96 % --   05/16/24 1036 -- -- -- 79 14 97 % --   05/16/24 1031 (!) 167/117 -- -- 78 14 97 % --   05/16/24 1026 -- -- -- 80 13 96 % --   05/16/24 1021 -- -- -- 78 14 97 % --   05/16/24 1016 (!) 169/114 -- -- 78 14 97 % --   05/16/24 1011 -- -- -- 80 14 97 % --   05/16/24 1006 -- -- -- 77 15 96 % --   05/16/24 1001 (!) 163/117 -- -- 82 15 96 % --   05/16/24 0956 -- -- -- 93 19 97 % --   05/16/24 0951 -- -- -- 79 13 98 % --   05/16/24 0946 (!)    Place 1 patch onto the skin daily as needed (if patient smokes/requests nicotine replacent therapy)    OMEPRAZOLE (PRILOSEC) 40 MG DELAYED RELEASE CAPSULE    Take 1 capsule by mouth every morning (before breakfast)    TIOTROPIUM (SPIRIVA HANDIHALER) 18 MCG INHALATION CAPSULE    Inhale 1 capsule into the lungs daily     Orders Placed This Encounter   Medications    fentaNYL (SUBLIMAZE) injection 50 mcg    iopamidol (ISOVUE-370) 76 % injection 75 mL    morphine injection 4 mg    enoxaparin (LOVENOX) injection 80 mg     Order Specific Question:   Indication of Use     Answer:   Treatment-DVT/PE       SURGICAL HISTORY       Past Surgical History:   Procedure Laterality Date    FRACTURE SURGERY         PAST MEDICAL HISTORY       Past Medical History:   Diagnosis Date    Arthritis     CAD (coronary artery disease)     Headache(784.0)     Hyperlipidemia     Hypertension     Pneumonia     Unspecified cerebral artery occlusion with cerebral infarction        Labs:  Labs Reviewed   CBC WITH AUTO DIFFERENTIAL - Abnormal; Notable for the following components:       Result Value    RBC 6.08 (*)     MCV 73.5 (*)     MCH 23.0 (*)     RDW 19.1 (*)     Platelet, Immature Fraction 16.9 (*)     All other components within normal limits   BLOOD GAS, VENOUS - Abnormal; Notable for the following components:    pO2, Tobi 27.0 (*)     Positive Base Excess, Tobi 3.1 (*)     O2 Sat, Tobi 56.0 (*)     Carboxyhemoglobin 7.9 (*)     All other components within normal limits   ELECTROLYTES PLUS - Abnormal; Notable for the following components:    POC Potassium 5.1 (*)     All other components within normal limits   BASIC METABOLIC PANEL - Abnormal; Notable for the following components:    Anion Gap 8 (*)     All other components within normal limits   VENOUS BLOOD GAS, POINT OF CARE - Abnormal; Notable for the following components:    pCO2, Tobi 52.0 (*)     pO2, Tobi 14.2 (*)     HCO3, Venous 30.4 (*)     Positive Base Excess, Tobi 3.7 (*)     O2

## 2024-05-16 NOTE — ED PROVIDER NOTES
[facility], [place]     Emergency Department     Faculty Attestation    I performed a history and physical examination of the patient and discussed management with the resident. I reviewed the resident´s note and agree with the documented findings and plan of care. Any areas of disagreement are noted on the chart. I was personally present for the key portions of any procedures. I have documented in the chart those procedures where I was not present during the key portions. I have reviewed the emergency nurses triage note. I agree with the chief complaint, past medical history, past surgical history, allergies, medications, social and family history as documented unless otherwise noted below. For Physician Assistant/ Nurse Practitioner cases/documentation I have personally evaluated this patient and have completed at least one if not all key elements of the E/M (history, physical exam, and MDM). Additional findings are as noted.    Mild respiratory distress with rales at the bases,  Heart exam normal , no pain or swelling on examination of the lower extremities , equal pulses both wrists , trachea midline.  Abdomen is nontender without pulsatile mass or bruit.  Chest pain does not radiate to the back , but the pain is pleuritic. Patient appears uncomfortable in no distress, skin is warm and dry.  Patient placed on supplemental oxygen because his pulse ox on room air was 86%, patient is not normally on oxygen at home.    Cyrus Barboza MD FACEP  Attending Physician       EKG Interpretation    Interpreted by emergency department physician    Rhythm: normal sinus   Rate: normal/77  Axis: Left -64 degrees  Ectopy: none  Conduction: Left anterior hemiblock  Nonspecific ST-T wave changes  Q Waves: Septal with poor R wave progression    Clinical Impression: Abnormal EKG    Cyrus Barboza, III                Cyrus Barboza MD  05/16/24 0769

## 2024-05-16 NOTE — ED NOTES
Patient presents to the ED via EMS with c/o chest pain. Patient reports he was driving here after he started having left sided chest pain that developed an hour ago. Patient reports he was unable to get out of his car in the parking lot of the hospital and called EMS. EMS administered one Nitro and 324 ASA prior to arrival. Patient does report hx of COPD. Patient reports pain is a 5/10, non radiating pain. Patient is alert and oriented x4, answering questions appropriately. Patient is changed into a gown, placed on cardiac monitor, EKG done, IV established, will continue plan of care.     On arrival, patient O2 sat 86-87%, patient placed up to 5L NC with minimal change in O2 sat. Dr. Durand at bedside.

## 2024-05-16 NOTE — PROGRESS NOTES
Primary team and cardiology sent the following page @ 4010  OMKAR marti this pts trop had a huge jump from 29 @ 1030 to 342 at 1700 denies CP/ no sx at this time       EKG obtained  Results noted at bedside b primary team  Awaiting a page back from cardiology

## 2024-05-16 NOTE — ED NOTES
Patient O2 dropped down to 86% while on room air, patient placed back on 2L NC, Dr. Durand notified.

## 2024-05-16 NOTE — PROGRESS NOTES
ED to inpatient nurses report      Chief Complaint:  Chief Complaint   Patient presents with    Chest Pain     Present to ED from: home    MOA:     LOC: alert and orientated to name, place, date  Mobility: Independent  Oxygen Baseline: 0    Current needs required: 0   Pending ED orders: na  Present condition: aox4 stable    Why did the patient come to the ED? 5/16  What is the plan? Dopplers to BLE and Lovenox  Any procedures or intervention occur? Chest CTA  Any safety concerns?? na    Mental Status:       Psych Assessment:   Psychosocial  Psychosocial (WDL): Within Defined Limits  Vital signs   Vitals:    05/16/24 1344 05/16/24 1359 05/16/24 1412 05/16/24 1415   BP: (!) 150/112 (!) 153/111 (!) 153/112 (!) 153/112   Pulse: 73 74 74 76   Resp: 13 14 14 14   Temp:       TempSrc:       SpO2: 98% 97% 98% 98%   Weight:            Vitals:  Patient Vitals for the past 24 hrs:   BP Temp Temp src Pulse Resp SpO2 Weight   05/16/24 1415 (!) 153/112 -- -- 76 14 98 % --   05/16/24 1412 (!) 153/112 -- -- 74 14 98 % --   05/16/24 1359 (!) 153/111 -- -- 74 14 97 % --   05/16/24 1344 (!) 150/112 -- -- 73 13 98 % --   05/16/24 1236 -- -- -- 76 13 98 % --   05/16/24 1226 (!) 154/105 -- -- 79 15 99 % --   05/16/24 1216 (!) 151/114 -- -- 81 14 98 % --   05/16/24 1210 -- -- -- 79 14 98 % --   05/16/24 1206 -- -- -- 79 13 98 % --   05/16/24 1156 -- -- -- 78 14 98 % --   05/16/24 1155 (!) 156/113 -- -- 73 14 98 % --   05/16/24 1140 (!) 163/116 -- -- 82 16 96 % --   05/16/24 1135 -- -- -- 82 17 98 % --   05/16/24 1125 (!) 163/114 -- -- 82 14 98 % --   05/16/24 1115 -- -- -- 98 24 95 % --   05/16/24 1110 -- -- -- 82 12 97 % --   05/16/24 1105 -- -- -- 77 14 97 % --   05/16/24 1055 (!) 160/115 -- -- 86 13 96 % --   05/16/24 1045 (!) 163/120 -- -- 86 16 90 % --   05/16/24 1041 -- -- -- 75 16 96 % --   05/16/24 1036 -- -- -- 79 14 97 % --   05/16/24 1035 -- -- -- 76 14 97 % --   05/16/24 1031 (!) 167/117 -- -- 78 14 97 % --   05/16/24 1026 --  injection 40 mg     Order Specific Question:   Indication of Use     Answer:   Prophylaxis-DVT/PE    OR Linked Order Group     ondansetron (ZOFRAN-ODT) disintegrating tablet 4 mg     ondansetron (ZOFRAN) injection 4 mg    polyethylene glycol (GLYCOLAX) packet 17 g    OR Linked Order Group     acetaminophen (TYLENOL) tablet 650 mg     acetaminophen (TYLENOL) suppository 650 mg    enoxaparin (LOVENOX) injection 80 mg     Order Specific Question:   Indication of Use     Answer:   Treatment-DVT/PE    albuterol sulfate HFA (PROVENTIL;VENTOLIN;PROAIR) 108 (90 Base) MCG/ACT inhaler 2 puff     Order Specific Question:   Initiate RT Bronchodilator Protocol     Answer:   Yes - Inpatient Protocol    albuterol (PROVENTIL) (2.5 MG/3ML) 0.083% nebulizer solution 2.5 mg     Order Specific Question:   Initiate RT Bronchodilator Protocol     Answer:   Yes - Inpatient Protocol    amLODIPine (NORVASC) tablet 5 mg    DISCONTD: fluticasone-umeclidin-vilant (TRELEGY ELLIPTA) 100-62.5-25 MCG/ACT inhaler 1 puff    nicotine (NICODERM CQ) 21 MG/24HR 1 patch    tiotropium (SPIRIVA RESPIMAT) 2.5 MCG/ACT inhaler 2 puff    carvedilol (COREG) tablet 3.125 mg    budesonide-formoterol (SYMBICORT) 160-4.5 MCG/ACT inhaler 2 puff    aspirin chewable tablet 81 mg    atorvastatin (LIPITOR) tablet 40 mg       SURGICAL HISTORY       Past Surgical History:   Procedure Laterality Date    FRACTURE SURGERY         PAST MEDICAL HISTORY       Past Medical History:   Diagnosis Date    Arthritis     CAD (coronary artery disease)     Headache(784.0)     Hyperlipidemia     Hypertension     Pneumonia     Unspecified cerebral artery occlusion with cerebral infarction        Labs:  Labs Reviewed   CBC WITH AUTO DIFFERENTIAL - Abnormal; Notable for the following components:       Result Value    RBC 6.08 (*)     MCV 73.5 (*)     MCH 23.0 (*)     RDW 19.1 (*)     Platelet, Immature Fraction 16.9 (*)     All other components within normal limits   BLOOD GAS, VENOUS -

## 2024-05-16 NOTE — ED PROVIDER NOTES
[facility] ED  Emergency Department Encounter  Emergency Medicine Resident     Pt Name:Jacky Olivares  MRN: [id number]  Birthdate 1950  Date of evaluation: 5/16/24  PCP:  Jenise Song APRN - CNP  Note Started: 7:52 AM EDT      CHIEF COMPLAINT       Chief Complaint   Patient presents with    Chest Pain       HISTORY OF PRESENT ILLNESS  (Location/Symptom, Timing/Onset, Context/Setting, Quality, Duration, Modifying Factors, Severity.)      Jacky Olivares is a 73 y.o. male who presents with chest pain that started 1 hour ago suddenly.  Patient tried to come to the hospital and was in the parking lot and was not able to make it so he called EMS.  With EMS they gave him aspirin and nitro, nondiagnostic EKG with them they brought him to the emergency department.  On arrival he was hypoxic put on oxygen, patient reports he does not wear any oxygen at home.  Has no history of blood clots, takes high blood pressure medications.  Does have a history of COPD but did not feel like he was having any wheezing prior to coming in and feels he has 5 out of 10 chest pressure that will not go away.  Denies any radiation to his back    PAST MEDICAL / SURGICAL / SOCIAL / FAMILY HISTORY      has a past medical history of Arthritis, CAD (coronary artery disease), Headache(784.0), Hyperlipidemia, Hypertension, Pneumonia, and Unspecified cerebral artery occlusion with cerebral infarction.        has a past surgical history that includes fracture surgery.       Social History     Socioeconomic History    Marital status:      Spouse name: Not on file    Number of children: Not on file    Years of education: Not on file    Highest education level: Not on file   Occupational History    Not on file   Tobacco Use    Smoking status: Every Day     Current packs/day: 1.00     Average packs/day: 1 pack/day for 30.2 years (30.2 ttl pk-yrs)     Types: Cigarettes     Start date: 2/28/1994    Smokeless tobacco: Never  ill-appearing. He is not toxic-appearing or diaphoretic.   HENT:      Head: Normocephalic and atraumatic.      Right Ear: External ear normal.      Left Ear: External ear normal.      Nose: Nose normal.   Eyes:      Conjunctiva/sclera: Conjunctivae normal.   Cardiovascular:      Rate and Rhythm: Normal rate and regular rhythm.      Pulses: Normal pulses.      Heart sounds: Normal heart sounds.   Pulmonary:      Effort: Pulmonary effort is normal. No respiratory distress.      Breath sounds: No stridor. Rales present. No wheezing.   Abdominal:      General: Abdomen is flat. There is no distension.      Palpations: Abdomen is soft.      Tenderness: There is no abdominal tenderness. There is no guarding or rebound.   Musculoskeletal:         General: No swelling.      Cervical back: No tenderness.      Right lower leg: No edema.      Left lower leg: Edema present.   Skin:     General: Skin is warm.      Capillary Refill: Capillary refill takes less than 2 seconds.   Neurological:      Mental Status: He is alert and oriented to person, place, and time.   Psychiatric:         Mood and Affect: Mood normal.           DDX/DIAGNOSTIC RESULTS / EMERGENCY DEPARTMENT COURSE / MDM     Medical Decision Making  Is a 73-year-old gentleman with a history of COPD, GERD, hyperlipidemia, hypertension, CAD presenting the emergency department with chest pain.  Differential including CAD, COPD exacerbation, PE cannot be excluded.  Patient did have CT imaging in March that did not show a thoracic aneurysm.  Obtain EKG, cardiac workup and CT of chest.    Amount and/or Complexity of Data Reviewed  Independent Historian: EMS  External Data Reviewed: labs, radiology, ECG and notes.  Labs: ordered. Decision-making details documented in ED Course.  Radiology: ordered. Decision-making details documented in ED Course.  ECG/medicine tests: ordered. Decision-making details documented in ED Course.    Risk  Prescription drug management.  Decision

## 2024-05-17 ENCOUNTER — APPOINTMENT (OUTPATIENT)
Dept: VASCULAR LAB | Age: 74
DRG: 280 | End: 2024-05-17
Payer: COMMERCIAL

## 2024-05-17 PROBLEM — I21.4 NSTEMI (NON-ST ELEVATED MYOCARDIAL INFARCTION) (HCC): Status: ACTIVE | Noted: 2024-05-17

## 2024-05-17 PROBLEM — E43 SEVERE MALNUTRITION (HCC): Status: ACTIVE | Noted: 2024-05-17

## 2024-05-17 PROBLEM — I24.9 ACS (ACUTE CORONARY SYNDROME) (HCC): Status: ACTIVE | Noted: 2024-05-16

## 2024-05-17 LAB
ALBUMIN SERPL-MCNC: 3.7 G/DL (ref 3.5–5.2)
ALBUMIN/GLOB SERPL: 1 {RATIO} (ref 1–2.5)
ALP SERPL-CCNC: 51 U/L (ref 40–129)
ALT SERPL-CCNC: 8 U/L (ref 10–50)
ANION GAP SERPL CALCULATED.3IONS-SCNC: 12 MMOL/L (ref 9–16)
ANTI-XA UNFRAC HEPARIN: 0.11 IU/L
ANTI-XA UNFRAC HEPARIN: 0.42 IU/L
ANTI-XA UNFRAC HEPARIN: 0.42 IU/L
AST SERPL-CCNC: 32 U/L (ref 10–50)
BASOPHILS # BLD: 0.05 K/UL (ref 0–0.2)
BASOPHILS NFR BLD: 1 % (ref 0–2)
BILIRUB DIRECT SERPL-MCNC: 0.2 MG/DL (ref 0–0.3)
BILIRUB INDIRECT SERPL-MCNC: 0.4 MG/DL (ref 0–1)
BILIRUB SERPL-MCNC: 0.6 MG/DL (ref 0–1.2)
BUN SERPL-MCNC: 14 MG/DL (ref 8–23)
CALCIUM SERPL-MCNC: 8.9 MG/DL (ref 8.6–10.4)
CHLORIDE SERPL-SCNC: 105 MMOL/L (ref 98–107)
CHOLEST SERPL-MCNC: 143 MG/DL (ref 0–199)
CHOLESTEROL/HDL RATIO: 3
CO2 SERPL-SCNC: 22 MMOL/L (ref 20–31)
CREAT SERPL-MCNC: 0.9 MG/DL (ref 0.7–1.2)
ECHO BSA: 1.98 M2
ECHO BSA: 1.98 M2
EKG ATRIAL RATE: 71 BPM
EKG ATRIAL RATE: 77 BPM
EKG ATRIAL RATE: 78 BPM
EKG P AXIS: 59 DEGREES
EKG P AXIS: 61 DEGREES
EKG P AXIS: 71 DEGREES
EKG P-R INTERVAL: 192 MS
EKG P-R INTERVAL: 196 MS
EKG P-R INTERVAL: 212 MS
EKG Q-T INTERVAL: 366 MS
EKG Q-T INTERVAL: 368 MS
EKG Q-T INTERVAL: 372 MS
EKG QRS DURATION: 100 MS
EKG QRS DURATION: 90 MS
EKG QRS DURATION: 92 MS
EKG QTC CALCULATION (BAZETT): 404 MS
EKG QTC CALCULATION (BAZETT): 416 MS
EKG QTC CALCULATION (BAZETT): 417 MS
EKG R AXIS: -64 DEGREES
EKG R AXIS: -67 DEGREES
EKG R AXIS: -70 DEGREES
EKG T AXIS: 67 DEGREES
EKG T AXIS: 71 DEGREES
EKG T AXIS: 77 DEGREES
EKG VENTRICULAR RATE: 71 BPM
EKG VENTRICULAR RATE: 77 BPM
EKG VENTRICULAR RATE: 78 BPM
EOSINOPHIL # BLD: 0.14 K/UL (ref 0–0.44)
EOSINOPHILS RELATIVE PERCENT: 2 % (ref 1–4)
ERYTHROCYTE [DISTWIDTH] IN BLOOD BY AUTOMATED COUNT: 18.7 % (ref 11.8–14.4)
GFR, ESTIMATED: 87 ML/MIN/1.73M2
GLOBULIN SER CALC-MCNC: 2.6 G/DL
GLUCOSE SERPL-MCNC: 95 MG/DL (ref 74–99)
HCT VFR BLD AUTO: 42.8 % (ref 40.7–50.3)
HDLC SERPL-MCNC: 53 MG/DL
HGB BLD-MCNC: 13 G/DL (ref 13–17)
IMM GRANULOCYTES # BLD AUTO: <0.03 K/UL (ref 0–0.3)
IMM GRANULOCYTES NFR BLD: 0 %
LDLC SERPL CALC-MCNC: 79 MG/DL (ref 0–100)
LYMPHOCYTES NFR BLD: 2.35 K/UL (ref 1.1–3.7)
LYMPHOCYTES RELATIVE PERCENT: 30 % (ref 24–43)
MCH RBC QN AUTO: 22.8 PG (ref 25.2–33.5)
MCHC RBC AUTO-ENTMCNC: 30.4 G/DL (ref 28.4–34.8)
MCV RBC AUTO: 75 FL (ref 82.6–102.9)
MONOCYTES NFR BLD: 0.6 K/UL (ref 0.1–1.2)
MONOCYTES NFR BLD: 8 % (ref 3–12)
NEUTROPHILS NFR BLD: 60 % (ref 36–65)
NEUTS SEG NFR BLD: 4.7 K/UL (ref 1.5–8.1)
NRBC BLD-RTO: 0 PER 100 WBC
PLATELET # BLD AUTO: ABNORMAL K/UL (ref 138–453)
PLATELET, FLUORESCENCE: 137 K/UL (ref 138–453)
PLATELETS.RETICULATED NFR BLD AUTO: 13 % (ref 1.1–10.3)
POTASSIUM SERPL-SCNC: 4 MMOL/L (ref 3.7–5.3)
PROT SERPL-MCNC: 6.3 G/DL (ref 6.6–8.7)
RBC # BLD AUTO: 5.71 M/UL (ref 4.21–5.77)
RBC # BLD: ABNORMAL 10*6/UL
SODIUM SERPL-SCNC: 139 MMOL/L (ref 136–145)
TRIGL SERPL-MCNC: 57 MG/DL
TROPONIN I SERPL HS-MCNC: 299 NG/L (ref 0–22)
TROPONIN I SERPL HS-MCNC: 388 NG/L (ref 0–22)
VLDLC SERPL CALC-MCNC: 11 MG/DL
WBC OTHER # BLD: 7.9 K/UL (ref 3.5–11.3)

## 2024-05-17 PROCEDURE — 2580000003 HC RX 258

## 2024-05-17 PROCEDURE — 6360000002 HC RX W HCPCS: Performed by: INTERNAL MEDICINE

## 2024-05-17 PROCEDURE — 93452 LEFT HRT CATH W/VENTRCLGRPHY: CPT | Performed by: INTERNAL MEDICINE

## 2024-05-17 PROCEDURE — 6360000002 HC RX W HCPCS

## 2024-05-17 PROCEDURE — 6370000000 HC RX 637 (ALT 250 FOR IP)

## 2024-05-17 PROCEDURE — 80048 BASIC METABOLIC PNL TOTAL CA: CPT

## 2024-05-17 PROCEDURE — B2151ZZ FLUOROSCOPY OF LEFT HEART USING LOW OSMOLAR CONTRAST: ICD-10-PCS | Performed by: INTERNAL MEDICINE

## 2024-05-17 PROCEDURE — 2500000003 HC RX 250 WO HCPCS: Performed by: INTERNAL MEDICINE

## 2024-05-17 PROCEDURE — 80076 HEPATIC FUNCTION PANEL: CPT

## 2024-05-17 PROCEDURE — 85025 COMPLETE CBC W/AUTO DIFF WBC: CPT

## 2024-05-17 PROCEDURE — 93458 L HRT ARTERY/VENTRICLE ANGIO: CPT | Performed by: INTERNAL MEDICINE

## 2024-05-17 PROCEDURE — 99223 1ST HOSP IP/OBS HIGH 75: CPT | Performed by: INTERNAL MEDICINE

## 2024-05-17 PROCEDURE — B2111ZZ FLUOROSCOPY OF MULTIPLE CORONARY ARTERIES USING LOW OSMOLAR CONTRAST: ICD-10-PCS | Performed by: INTERNAL MEDICINE

## 2024-05-17 PROCEDURE — 6360000004 HC RX CONTRAST MEDICATION: Performed by: INTERNAL MEDICINE

## 2024-05-17 PROCEDURE — 94640 AIRWAY INHALATION TREATMENT: CPT

## 2024-05-17 PROCEDURE — C1894 INTRO/SHEATH, NON-LASER: HCPCS | Performed by: INTERNAL MEDICINE

## 2024-05-17 PROCEDURE — 80061 LIPID PANEL: CPT

## 2024-05-17 PROCEDURE — 4A023N7 MEASUREMENT OF CARDIAC SAMPLING AND PRESSURE, LEFT HEART, PERCUTANEOUS APPROACH: ICD-10-PCS | Performed by: INTERNAL MEDICINE

## 2024-05-17 PROCEDURE — 93454 CORONARY ARTERY ANGIO S&I: CPT | Performed by: INTERNAL MEDICINE

## 2024-05-17 PROCEDURE — 99233 SBSQ HOSP IP/OBS HIGH 50: CPT | Performed by: INTERNAL MEDICINE

## 2024-05-17 PROCEDURE — 93010 ELECTROCARDIOGRAM REPORT: CPT | Performed by: INTERNAL MEDICINE

## 2024-05-17 PROCEDURE — 36415 COLL VENOUS BLD VENIPUNCTURE: CPT

## 2024-05-17 PROCEDURE — 85055 RETICULATED PLATELET ASSAY: CPT

## 2024-05-17 PROCEDURE — 85520 HEPARIN ASSAY: CPT

## 2024-05-17 PROCEDURE — 93970 EXTREMITY STUDY: CPT | Performed by: SURGERY

## 2024-05-17 PROCEDURE — 93970 EXTREMITY STUDY: CPT

## 2024-05-17 PROCEDURE — 84484 ASSAY OF TROPONIN QUANT: CPT

## 2024-05-17 PROCEDURE — 2060000000 HC ICU INTERMEDIATE R&B

## 2024-05-17 PROCEDURE — 2709999900 HC NON-CHARGEABLE SUPPLY: Performed by: INTERNAL MEDICINE

## 2024-05-17 PROCEDURE — 94761 N-INVAS EAR/PLS OXIMETRY MLT: CPT

## 2024-05-17 PROCEDURE — 2580000003 HC RX 258: Performed by: STUDENT IN AN ORGANIZED HEALTH CARE EDUCATION/TRAINING PROGRAM

## 2024-05-17 PROCEDURE — 2700000000 HC OXYGEN THERAPY PER DAY

## 2024-05-17 RX ORDER — SODIUM CHLORIDE 0.9 % (FLUSH) 0.9 %
5-40 SYRINGE (ML) INJECTION PRN
Status: DISCONTINUED | OUTPATIENT
Start: 2024-05-17 | End: 2024-05-18 | Stop reason: HOSPADM

## 2024-05-17 RX ORDER — SODIUM CHLORIDE 9 MG/ML
INJECTION, SOLUTION INTRAVENOUS PRN
Status: DISCONTINUED | OUTPATIENT
Start: 2024-05-17 | End: 2024-05-18 | Stop reason: HOSPADM

## 2024-05-17 RX ORDER — SODIUM CHLORIDE 0.9 % (FLUSH) 0.9 %
5-40 SYRINGE (ML) INJECTION EVERY 12 HOURS SCHEDULED
Status: DISCONTINUED | OUTPATIENT
Start: 2024-05-17 | End: 2024-05-18 | Stop reason: HOSPADM

## 2024-05-17 RX ORDER — MIDAZOLAM HYDROCHLORIDE 1 MG/ML
INJECTION INTRAMUSCULAR; INTRAVENOUS PRN
Status: DISCONTINUED | OUTPATIENT
Start: 2024-05-17 | End: 2024-05-17 | Stop reason: HOSPADM

## 2024-05-17 RX ORDER — VERAPAMIL HYDROCHLORIDE 2.5 MG/ML
INJECTION, SOLUTION INTRAVENOUS PRN
Status: DISCONTINUED | OUTPATIENT
Start: 2024-05-17 | End: 2024-05-17 | Stop reason: HOSPADM

## 2024-05-17 RX ORDER — NITROGLYCERIN 20 MG/100ML
INJECTION INTRAVENOUS PRN
Status: DISCONTINUED | OUTPATIENT
Start: 2024-05-17 | End: 2024-05-17 | Stop reason: HOSPADM

## 2024-05-17 RX ORDER — ACETAMINOPHEN 325 MG/1
650 TABLET ORAL EVERY 4 HOURS PRN
Status: DISCONTINUED | OUTPATIENT
Start: 2024-05-17 | End: 2024-05-17 | Stop reason: SDUPTHER

## 2024-05-17 RX ADMIN — ATORVASTATIN CALCIUM 40 MG: 40 TABLET, FILM COATED ORAL at 22:28

## 2024-05-17 RX ADMIN — BUDESONIDE AND FORMOTEROL FUMARATE DIHYDRATE 2 PUFF: 160; 4.5 AEROSOL RESPIRATORY (INHALATION) at 22:05

## 2024-05-17 RX ADMIN — ASPIRIN 81 MG 81 MG: 81 TABLET ORAL at 10:16

## 2024-05-17 RX ADMIN — SODIUM CHLORIDE, PRESERVATIVE FREE 10 ML: 5 INJECTION INTRAVENOUS at 10:17

## 2024-05-17 RX ADMIN — SODIUM CHLORIDE, PRESERVATIVE FREE 10 ML: 5 INJECTION INTRAVENOUS at 22:28

## 2024-05-17 RX ADMIN — CLOPIDOGREL BISULFATE 75 MG: 75 TABLET ORAL at 10:16

## 2024-05-17 RX ADMIN — HEPARIN SODIUM 12 UNITS/KG/HR: 10000 INJECTION, SOLUTION INTRAVENOUS at 18:18

## 2024-05-17 RX ADMIN — AMLODIPINE BESYLATE 5 MG: 10 TABLET ORAL at 10:17

## 2024-05-17 RX ADMIN — CARVEDILOL 3.12 MG: 3.12 TABLET, FILM COATED ORAL at 17:19

## 2024-05-17 RX ADMIN — CARVEDILOL 3.12 MG: 3.12 TABLET, FILM COATED ORAL at 10:16

## 2024-05-17 ASSESSMENT — PAIN - FUNCTIONAL ASSESSMENT: PAIN_FUNCTIONAL_ASSESSMENT: ACTIVITIES ARE NOT PREVENTED

## 2024-05-17 ASSESSMENT — PAIN SCALES - GENERAL
PAINLEVEL_OUTOF10: 0
PAINLEVEL_OUTOF10: 0

## 2024-05-17 NOTE — CARE COORDINATION
Received social work consult for Homelessness.  Met with pt to complete assessment and offer resources.  Pt states he and his wife moved from [place] to [place] recently and were living with their daughter.  In February patients grandson who was living in the home went off of his medication for schizophrenia and he and his wife had to move out.  Since February they have been living in their car.  A week ago he and his wife  and plan to divorce and his wife is now back at their daughters home.  Pt is agreeable to staying at a shelter.  Provided pt with shelter list and pt also called [place] [id number] to be put on waiting list for area shelters.  Provided pt with subsidized housing information and food pantry list and made referral to [facility].  Pt appreciative of information provided.  Once discharge date is known, can call [facility] to check on availability at shelter.  If shelter is not available at d/c pt will plan to return to his car until bed becomes available at shelter.

## 2024-05-17 NOTE — PLAN OF CARE
Problem: Discharge Planning  Goal: Discharge to home or other facility with appropriate resources  5/17/2024 1805 by Roxana Rojas RN  Outcome: Progressing  5/17/2024 0421 by Mony Mcgraw RN  Outcome: Progressing     Problem: Pain  Goal: Verbalizes/displays adequate comfort level or baseline comfort level  5/17/2024 1805 by Roxana Rojas RN  Outcome: Progressing  5/17/2024 0421 by Mony Mcgraw RN  Outcome: Progressing     Problem: ABCDS Injury Assessment  Goal: Absence of physical injury  5/17/2024 1805 by Roxana Rojas RN  Outcome: Progressing  5/17/2024 0421 by Mony Mcgraw RN  Outcome: Progressing     Problem: Risk for Elopement  Goal: Patient will not exit the unit/facility without proper excort  5/17/2024 1805 by Roxana Rojas RN  Outcome: Progressing  5/17/2024 0421 by Mony Mcgraw RN  Outcome: Progressing  Flowsheets  Taken 5/17/2024 0400  Nursing Interventions for Elopement Risk:   Assist with personal care needs such as toileting, eating, dressing, as needed to reduce the risk of wandering   Collaborate with family members/caregivers to mitigate the elopement risk   Make sure patient has all necessary personal care items  Taken 5/17/2024 0000  Nursing Interventions for Elopement Risk:   Assist with personal care needs such as toileting, eating, dressing, as needed to reduce the risk of wandering   Collaborate with family members/caregivers to mitigate the elopement risk   Make sure patient has all necessary personal care items  Taken 5/16/2024 2000  Nursing Interventions for Elopement Risk:   Assist with personal care needs such as toileting, eating, dressing, as needed to reduce the risk of wandering   Collaborate with family members/caregivers to mitigate the elopement risk   Make sure patient has all necessary personal care items     Problem: Chronic Conditions and Co-morbidities  Goal: Patient's chronic conditions and co-morbidity symptoms are monitored and maintained or

## 2024-05-17 NOTE — PROGRESS NOTES
Comprehensive Nutrition Assessment    Type and Reason for Visit:  Positive Nutrition Screen (wt loss, poor po intake)    Nutrition Recommendations/Plan:   Advance diet as medically able to Regular with TJ and double protein portions  Will send Frozen ONS BID when diet advanced  Monitor/encourage Po intake     Malnutrition Assessment:  Malnutrition Status:  Severe malnutrition (05/17/24 1021)    Context:  Acute Illness     Findings of the 6 clinical characteristics of malnutrition:  Energy Intake:  50% or less of estimated energy requirements for 5 or more days  Weight Loss:  Greater than 7.5% over 3 months     Body Fat Loss:  Mild body fat loss Orbital, Buccal region   Muscle Mass Loss:  Unable to assess    Fluid Accumulation:  No significant fluid accumulation     Strength:  Not Performed    Nutrition Assessment:    74 yo M adm pulmonary embolism. PMh significant for CAD, HLD, HTN, experiencing homelessness. Pt reports 25-30 lb wt loss since December, per chart, 10.3% wt loss x 3 mos. Pt reports poor appetite/PO intake in that time, pt reports eating < 50% during that time. Discussed ONS, pt declines liquid ONS, agreeable to trial Magic Cup. Per pt NKFA. Pt is agreeable to double protein portions at at meals. No BM noted. +2 RLE edema noted.    Nutrition Related Findings:    Labs/meds reviewed Wound Type: None       Current Nutrition Intake & Therapies:    Average Meal Intake: NPO  Average Supplements Intake: NPO  Diet NPO    Anthropometric Measures:  Height: 188 cm (6' 2.02\")  Ideal Body Weight (IBW): 190 lbs (86 kg)       Current Body Weight: 74.9 kg (165 lb 2 oz) (5/16/24), 86.9 % IBW.    Current BMI (kg/m2): 21.2  Usual Body Weight: 83.5 kg (184 lb 1.4 oz) (2/28/24)  % Weight Change (Calculated): -10.3  Weight Adjustment For: No Adjustment                 BMI Categories: Normal Weight (BMI 18.5-24.9)    Estimated Daily Nutrient Needs:  Energy Requirements Based On: Kcal/kg  Weight Used for Energy

## 2024-05-17 NOTE — PLAN OF CARE
Problem: Discharge Planning  Goal: Discharge to home or other facility with appropriate resources  Outcome: Progressing     Problem: Pain  Goal: Verbalizes/displays adequate comfort level or baseline comfort level  Outcome: Progressing     Problem: ABCDS Injury Assessment  Goal: Absence of physical injury  Outcome: Progressing     Problem: Risk for Elopement  Goal: Patient will not exit the unit/facility without proper excort  Outcome: Progressing  Flowsheets  Taken 5/17/2024 0400  Nursing Interventions for Elopement Risk:   Assist with personal care needs such as toileting, eating, dressing, as needed to reduce the risk of wandering   Collaborate with family members/caregivers to mitigate the elopement risk   Make sure patient has all necessary personal care items  Taken 5/17/2024 0000  Nursing Interventions for Elopement Risk:   Assist with personal care needs such as toileting, eating, dressing, as needed to reduce the risk of wandering   Collaborate with family members/caregivers to mitigate the elopement risk   Make sure patient has all necessary personal care items  Taken 5/16/2024 2000  Nursing Interventions for Elopement Risk:   Assist with personal care needs such as toileting, eating, dressing, as needed to reduce the risk of wandering   Collaborate with family members/caregivers to mitigate the elopement risk   Make sure patient has all necessary personal care items

## 2024-05-17 NOTE — PROGRESS NOTES
[facility]  Internal Medicine Teaching Residency Program  Inpatient Daily Progress Note  ______________________________________________________________________________    Patient: Jacky Olivares  YOB: 1950   MRN:[id number]    Acct: [id number]     Room: [facility]0502-01  Admit date: 2024  Today's date: 24  Number of days in the hospital: 1    SUBJECTIVE   Admitting Diagnosis: Single subsegmental pulmonary embolism without acute cor pulmonale (HCC)  CC: chest pain    Overnight, patient's troponin elevated to 421. Started on heparin infusion.  Pt examined at bedside. Chart & results reviewed.   Denies any chest pain.  BP controlled. Saturating on 3 L O2 NC  On heparin infusion.  Venous doppler negative  Awaiting cardiology evaluation      BRIEF HISTORY     The patient is a pleasant 73 y.o. male with a MHx significant for      Hypertension  COPD  Dyslipidemia  GERD     presents with a chief complaint of left-sided chest pain that started this morning, non-radiating, non-reproducible, no aggravating or relieving factors. Patient denies any symptoms of shortness of breath, palpitations, or PND.  No complaints of epigastric pain, nausea or vomiting.  Patient reports intermittent chest pain in the past but today's was worse than before.     Patient reports noncompliance with all the medications, takes them on and off.  At admission in the ED patient was found to be hypertensive, with SBP 160s. He was hypoxic and started on supplemental oxygen 2 L NC. Troponin 15-19-29.  proBNP 57.  CT PE showed small segmental PE in the anterior right upper lobe.    OBJECTIVE     Vital Signs:  /76   Pulse 75   Temp 99.7 °F (37.6 °C) (Oral)   Resp 15   Ht 1.88 m (6' 2\")   Wt 74.9 kg (165 lb 2 oz)   SpO2 96%   BMI 21.20 kg/m²     Temp (24hrs), Av.6 °F (37 °C), Min:97.8 °F (36.6 °C), Max:99.7 °F (37.6 °C)    In: 90.3   Out: 225 [Urine:225]    Physical  significant for HTN, COPD and GERD who presented with chest pain and found to have pulmonary embolism, NSTEMI.   Patient admitted to inpatient status for further management.    Principal Problem:    Single subsegmental pulmonary embolism without acute cor pulmonale (HCC)  Active Problems:    Chronic obstructive pulmonary disease (HCC)    Smoker    Chest pain    Acute respiratory failure with hypoxia and hypercapnia (HCC)    Elevated troponin    Hypertensive urgency    Noncompliance with medication regimen    Homeless  Resolved Problems:    * No resolved hospital problems. *      NSTEMI:  -  Tn 15--19--29 went up to 421. EKG showed left anterior hemiblock.   - started on heparin infusion.  - continue aspirin, Lipitor and Plavix  - Cardiology consulted.     Subsegmental pulmonary embolism:  - CT PE showed small subsegmental PE in the anterior right upper lobe. No evidence of right heart strain.  - Pending 2 D echo.  - venous doppler of bilateral lower extremities negative.  - on heparin infusion     Hypertension: BP at admission 160/110. on Norvasc 5 mg at home. Resumed Norvasc. Started on coreg 3.125 mg. Monitor BP.     COPD: on trelegy at home. Started on Symbicort and Spiriva. On 2 L oxygen. Wean as tolerated. Will need home O2 eval.    Hypertensive urgency: resolved    Diet: NPO  DVT ppx : heparin infusion  GI ppx: not indicated    PT/OT: consulted  Discharge Planning / SW: plan once medically stable    Naveen Jones MD  Internal Medicine Resident, PGY-1  [place], [place]  5/17/2024,6:13 AM'

## 2024-05-17 NOTE — CONSULTS
[facility]    Consult / H&P               Today's Date: 5/17/2024  Patient Name: Jacky Olivares  Date of admission: 5/16/2024  7:25 AM  Patient's age: 73 y.o., 1950  Admission Dx: Chest pain [R07.9]  Elevated troponin [R79.89]  Chest pain, unspecified type [R07.9]  Single subsegmental pulmonary embolism without acute cor pulmonale (HCC) [I26.93]    Requesting Physician: Meera Barnett MD    Cardiac Evaluation Reason: Chest pain, uptrending troponin, PE    History Obtained From: patient and chart review     History of Present Illness:    This patient 73 y.o. years old with past medical history hypertension, COPD, dyslipidemia came in with a chief complaint of left-sided chest pain  Patient noncompliant medication taking them on and off.  Evaluation in the ED found to be hypertensive with SBP 160s.  Was hypoxic requiring oxygen started on 2 L nasal cannula.  Troponin 15> 19> 29 .  EKG suggestive of left anterior hemiblock  CT PE suggestive of PE in the anterior right upper lobe.  On therapeutic dose of Lovenox    Blood pressure admission 160/110 on Norvasc 5 at home resumed started on Coreg 3.125 at primary team  On my evaluation the patient currently is hemodynamically stable, symptomatically better with no more pain.  Venous Doppler was unremarkable no concerns for DVT    Past Medical History:   has a past medical history of Arthritis, CAD (coronary artery disease), Headache(784.0), Hyperlipidemia, Hypertension, Pneumonia, and Unspecified cerebral artery occlusion with cerebral infarction.    Past Surgical History:   has a past surgical history that includes fracture surgery.     Home Medications:    Prior to Admission medications    Medication Sig Start Date End Date Taking? Authorizing Provider   methocarbamol (ROBAXIN) 500 MG tablet Take 1 tablet by mouth 4 times daily   Yes Provider, MD Alanis   metoprolol succinate (TOPROL XL) 50 MG extended release tablet Take 1 tablet by mouth  See Reflexed IPF Result See Reflexed IPF Result     BMP:   Recent Labs     05/16/24  0842 05/17/24  0218    139   K 3.7 4.0   CO2 28 22   BUN 13 14   CREATININE 1.2 0.9   LABGLOM 68 87   GLUCOSE 96 95     BNP: No results for input(s): \"BNP\" in the last 72 hours.  PT/INR:   Recent Labs     05/16/24 1953   PROTIME 13.2   INR 1.0     APTT:  Recent Labs     05/16/24 1953   APTT 29.0     CARDIAC ENZYMES:No results for input(s): \"CKTOTAL\", \"CKMB\", \"CKMBINDEX\", \"TROPONINI\" in the last 72 hours.    ASSESSMENT:  NSTEMI  Subsegmental PE  Hypertension(at home on Norvasc 5 and metoprolol 50  COPD      RECOMMENDATIONS:  Cardiac cath for ischemic workup after discussion with attending  Keep n.p.o.  Switch to IV heparin for anticoagulation, stop Lovenox  Continue aspirin, Lipitor, Coreg  Continue Norvasc, Coreg for blood pressure control  Replace electrolytes and keep potassium above 4 and magnesium above 2  Medical management as per primary  Advised smoking abstinence      Please wait for final attestation from rounding attending       Ruthie Yung MD  Internal Medicine Resident, PGY-1  [facility], [place], [place]  5/17/2024, 8:24 AM      Attending Physician Statement:    I have discussed the care of  Jacky Olivares , including pertinent history and exam findings, with the Cardiology fellow/resident.     I have seen and examined the patient and the key elements of all parts of the encounter have been performed by me. I agree with the assessment, plan and orders as documented by the fellow/resident, after I modified exam findings and plan of treatments, and the final version is my approved version of the assessment.     Additional Comments:     73 years old male with history of hypertension and chronic smoking with noncompliance presented to the hospital with left-sided chest pain  and associated shortness of breath noted to have small subsegmental PE with negative ultrasound of the lower extremities for DVT

## 2024-05-17 NOTE — PROGRESS NOTES
[facility]   Progress Note                   Date:   5/17/2024  Patient name: Jacky Olivares  Date of admission:  5/16/2024  7:25 AM  MRN:   [id number]  YOB: 1950  PCP: Jenise Song APRN - CNP    Reason for Admission: Chest pain [R07.9]  Elevated troponin [R79.89]  Chest pain, unspecified type [R07.9]  Single subsegmental pulmonary embolism without acute cor pulmonale (HCC) [I26.93]    Subjective:       Clinical Changes / Abnormalities:Pt seen and examined in the room.  Pt denies any CP or sob. Pt sitting at side of bed.  Labs, vitals and tele reviewed-        Medications:   Scheduled Meds:   sodium chloride flush  5-40 mL IntraVENous 2 times per day    amLODIPine  5 mg Oral Daily    tiotropium  2 puff Inhalation Daily RT    carvedilol  3.125 mg Oral BID WC    budesonide-formoterol  2 puff Inhalation BID RT    aspirin  81 mg Oral Daily    atorvastatin  40 mg Oral Nightly    clopidogrel  75 mg Oral Daily     Continuous Infusions:   sodium chloride      heparin (PORCINE) Infusion 12 Units/kg/hr (05/17/24 0612)     CBC:   Recent Labs     05/16/24  0738 05/17/24 0218   WBC 9.5 7.9   HGB 14.0 13.0   PLT See Reflexed IPF Result See Reflexed IPF Result     BMP:    Recent Labs     05/16/24  0736 05/16/24  0842 05/17/24 0218   NA  --  141 139   K  --  3.7 4.0   CL  --  105 105   CO2  --  28 22   BUN  --  13 14   CREATININE 1.0 1.2 0.9   GLUCOSE  --  96 95     Hepatic:   Recent Labs     05/17/24 0218   AST 32   ALT 8*   BILITOT 0.6   ALKPHOS 51     Troponin:   Recent Labs     05/16/24  2203 05/17/24  0011 05/17/24 0218   TROPHS 421* 388* 299*     BNP: No results for input(s): \"BNP\" in the last 72 hours.  Lipids:   Recent Labs     05/17/24 0218   CHOL 143   HDL 53     INR:   Recent Labs     05/16/24 1953   INR 1.0       Objective:   Vitals: BP (!) 128/96   Pulse 85   Temp 98.4 °F (36.9 °C) (Oral)   Resp 17   Ht 1.88 m (6' 2.02\")   Wt 74.9 kg (165 lb 2 oz)   SpO2 96%   BMI 21.19

## 2024-05-17 NOTE — CARE COORDINATION
Case Management Assessment  Initial Evaluation    Date/Time of Evaluation: 5/17/2024 11:05AM  Assessment Completed by: Tosin Marley RN    If patient is discharged prior to next notation, then this note serves as note for discharge by case management.    Patient Name: Jacky Olivares                   YOB: 1950  Diagnosis: Chest pain [R07.9]  Elevated troponin [R79.89]  Chest pain, unspecified type [R07.9]  Single subsegmental pulmonary embolism without acute cor pulmonale (HCC) [I26.93]                   Date / Time: 5/16/2024  7:25 AM    Patient Admission Status: Inpatient   Readmission Risk (Low < 19, Mod (19-27), High > 27): Readmission Risk Score: 10.1    Current PCP: Jenise Song APRN - CNP  PCP verified by CM? Yes (Jenise Song CNP)    Chart Reviewed: Yes      History Provided by: Patient  Patient Orientation: Alert and Oriented, Person, Place, Situation    Patient Cognition: Alert    Hospitalization in the last 30 days (Readmission):  No    If yes, Readmission Assessment in  Navigator will be completed.    Advance Directives:      Code Status: Full Code   Patient's Primary Decision Maker is: Legal Next of Kin      Discharge Planning:    Patient lives with: Other (Comment) (living in car) Type of Home: Homeless (sleeping in car)  Primary Care Giver: Self  Patient Support Systems include: Children, Family Members   Current Financial resources: Medicaid, Medicare  Current community resources: None  Current services prior to admission: None            Current DME:              Type of Home Care services:  None    ADLS  Prior functional level: Independent in ADLs/IADLs  Current functional level: Independent in ADLs/IADLs    PT AM-PAC:   /24  OT AM-PAC:   /24    Family can provide assistance at DC: No  Would you like Case Management to discuss the discharge plan with any other family members/significant others, and if so, who? No  Plans to Return to Present Housing: Other (see comment)

## 2024-05-18 VITALS
TEMPERATURE: 98.4 F | BODY MASS INDEX: 21.19 KG/M2 | WEIGHT: 165.12 LBS | DIASTOLIC BLOOD PRESSURE: 75 MMHG | SYSTOLIC BLOOD PRESSURE: 110 MMHG | RESPIRATION RATE: 18 BRPM | HEART RATE: 72 BPM | OXYGEN SATURATION: 96 % | HEIGHT: 74 IN

## 2024-05-18 LAB
AMPHET UR QL SCN: NEGATIVE
ANION GAP SERPL CALCULATED.3IONS-SCNC: 9 MMOL/L (ref 9–16)
ANTI-XA UNFRAC HEPARIN: 0.14 IU/L
ANTI-XA UNFRAC HEPARIN: 0.54 IU/L
ANTI-XA UNFRAC HEPARIN: 0.62 IU/L
BARBITURATES UR QL SCN: NEGATIVE
BASOPHILS # BLD: 0.04 K/UL (ref 0–0.2)
BASOPHILS NFR BLD: 1 % (ref 0–2)
BENZODIAZ UR QL: POSITIVE
BUN SERPL-MCNC: 15 MG/DL (ref 8–23)
CALCIUM SERPL-MCNC: 9.1 MG/DL (ref 8.6–10.4)
CANNABINOIDS UR QL SCN: POSITIVE
CHLORIDE SERPL-SCNC: 106 MMOL/L (ref 98–107)
CO2 SERPL-SCNC: 25 MMOL/L (ref 20–31)
COCAINE UR QL SCN: NEGATIVE
CREAT SERPL-MCNC: 1 MG/DL (ref 0.7–1.2)
EKG ATRIAL RATE: 88 BPM
EKG P AXIS: 81 DEGREES
EKG P-R INTERVAL: 196 MS
EKG Q-T INTERVAL: 360 MS
EKG QRS DURATION: 88 MS
EKG QTC CALCULATION (BAZETT): 435 MS
EKG R AXIS: -73 DEGREES
EKG T AXIS: 73 DEGREES
EKG VENTRICULAR RATE: 88 BPM
EOSINOPHIL # BLD: 0.1 K/UL (ref 0–0.44)
EOSINOPHILS RELATIVE PERCENT: 1 % (ref 1–4)
ERYTHROCYTE [DISTWIDTH] IN BLOOD BY AUTOMATED COUNT: 18.7 % (ref 11.8–14.4)
FENTANYL UR QL: POSITIVE
GFR, ESTIMATED: 77 ML/MIN/1.73M2
GLUCOSE SERPL-MCNC: 119 MG/DL (ref 74–99)
HCT VFR BLD AUTO: 41.8 % (ref 40.7–50.3)
HGB BLD-MCNC: 12.6 G/DL (ref 13–17)
IMM GRANULOCYTES # BLD AUTO: <0.03 K/UL (ref 0–0.3)
IMM GRANULOCYTES NFR BLD: 0 %
LYMPHOCYTES NFR BLD: 2.37 K/UL (ref 1.1–3.7)
LYMPHOCYTES RELATIVE PERCENT: 32 % (ref 24–43)
MCH RBC QN AUTO: 23 PG (ref 25.2–33.5)
MCHC RBC AUTO-ENTMCNC: 30.1 G/DL (ref 28.4–34.8)
MCV RBC AUTO: 76.4 FL (ref 82.6–102.9)
METHADONE UR QL: NEGATIVE
MONOCYTES NFR BLD: 0.58 K/UL (ref 0.1–1.2)
MONOCYTES NFR BLD: 8 % (ref 3–12)
NEUTROPHILS NFR BLD: 58 % (ref 36–65)
NEUTS SEG NFR BLD: 4.23 K/UL (ref 1.5–8.1)
NRBC BLD-RTO: 0 PER 100 WBC
OPIATES UR QL SCN: NEGATIVE
OXYCODONE UR QL SCN: NEGATIVE
PCP UR QL SCN: NEGATIVE
PLATELET # BLD AUTO: ABNORMAL K/UL (ref 138–453)
PLATELET, FLUORESCENCE: 136 K/UL (ref 138–453)
PLATELETS.RETICULATED NFR BLD AUTO: 12.7 % (ref 1.1–10.3)
POTASSIUM SERPL-SCNC: 3.8 MMOL/L (ref 3.7–5.3)
RBC # BLD AUTO: 5.47 M/UL (ref 4.21–5.77)
RBC # BLD: ABNORMAL 10*6/UL
SODIUM SERPL-SCNC: 140 MMOL/L (ref 136–145)
TEST INFORMATION: ABNORMAL
WBC OTHER # BLD: 7.3 K/UL (ref 3.5–11.3)

## 2024-05-18 PROCEDURE — 2700000000 HC OXYGEN THERAPY PER DAY

## 2024-05-18 PROCEDURE — 6370000000 HC RX 637 (ALT 250 FOR IP)

## 2024-05-18 PROCEDURE — 85520 HEPARIN ASSAY: CPT

## 2024-05-18 PROCEDURE — 85055 RETICULATED PLATELET ASSAY: CPT

## 2024-05-18 PROCEDURE — 36415 COLL VENOUS BLD VENIPUNCTURE: CPT

## 2024-05-18 PROCEDURE — 94640 AIRWAY INHALATION TREATMENT: CPT

## 2024-05-18 PROCEDURE — 2580000003 HC RX 258

## 2024-05-18 PROCEDURE — 6360000002 HC RX W HCPCS

## 2024-05-18 PROCEDURE — 80307 DRUG TEST PRSMV CHEM ANLYZR: CPT

## 2024-05-18 PROCEDURE — 94761 N-INVAS EAR/PLS OXIMETRY MLT: CPT

## 2024-05-18 PROCEDURE — 2580000003 HC RX 258: Performed by: STUDENT IN AN ORGANIZED HEALTH CARE EDUCATION/TRAINING PROGRAM

## 2024-05-18 PROCEDURE — 99233 SBSQ HOSP IP/OBS HIGH 50: CPT | Performed by: INTERNAL MEDICINE

## 2024-05-18 PROCEDURE — 93010 ELECTROCARDIOGRAM REPORT: CPT | Performed by: INTERNAL MEDICINE

## 2024-05-18 PROCEDURE — 80048 BASIC METABOLIC PNL TOTAL CA: CPT

## 2024-05-18 PROCEDURE — 99233 SBSQ HOSP IP/OBS HIGH 50: CPT | Performed by: NURSE PRACTITIONER

## 2024-05-18 PROCEDURE — 85025 COMPLETE CBC W/AUTO DIFF WBC: CPT

## 2024-05-18 PROCEDURE — 93005 ELECTROCARDIOGRAM TRACING: CPT

## 2024-05-18 RX ORDER — CLOPIDOGREL BISULFATE 75 MG/1
75 TABLET ORAL DAILY
Qty: 60 TABLET | Refills: 1 | Status: SHIPPED | OUTPATIENT
Start: 2024-05-19 | End: 2024-09-16

## 2024-05-18 RX ORDER — CLOPIDOGREL BISULFATE 75 MG/1
75 TABLET ORAL DAILY
Qty: 60 TABLET | Refills: 0 | Status: SHIPPED | OUTPATIENT
Start: 2024-05-19 | End: 2024-05-18

## 2024-05-18 RX ORDER — BUDESONIDE AND FORMOTEROL FUMARATE DIHYDRATE 160; 4.5 UG/1; UG/1
2 AEROSOL RESPIRATORY (INHALATION)
Qty: 4 EACH | Refills: 0 | Status: SHIPPED | OUTPATIENT
Start: 2024-05-18 | End: 2024-05-18

## 2024-05-18 RX ORDER — ASPIRIN 81 MG/1
81 TABLET, CHEWABLE ORAL DAILY
Qty: 60 TABLET | Refills: 1 | Status: SHIPPED | OUTPATIENT
Start: 2024-05-19 | End: 2024-09-16

## 2024-05-18 RX ORDER — CARVEDILOL 3.12 MG/1
3.12 TABLET ORAL 2 TIMES DAILY WITH MEALS
Qty: 120 TABLET | Refills: 1 | Status: SHIPPED | OUTPATIENT
Start: 2024-05-18 | End: 2024-09-15

## 2024-05-18 RX ORDER — ATORVASTATIN CALCIUM 40 MG/1
40 TABLET, FILM COATED ORAL NIGHTLY
Qty: 60 TABLET | Refills: 1 | Status: SHIPPED | OUTPATIENT
Start: 2024-05-18 | End: 2024-09-15

## 2024-05-18 RX ORDER — CARVEDILOL 3.12 MG/1
3.12 TABLET ORAL 2 TIMES DAILY WITH MEALS
Qty: 120 TABLET | Refills: 0 | Status: SHIPPED | OUTPATIENT
Start: 2024-05-18 | End: 2024-05-18

## 2024-05-18 RX ORDER — ASPIRIN 81 MG/1
81 TABLET, CHEWABLE ORAL DAILY
Qty: 60 TABLET | Refills: 0 | Status: SHIPPED | OUTPATIENT
Start: 2024-05-19 | End: 2024-05-18

## 2024-05-18 RX ORDER — BUDESONIDE AND FORMOTEROL FUMARATE DIHYDRATE 160; 4.5 UG/1; UG/1
2 AEROSOL RESPIRATORY (INHALATION)
Qty: 4 EACH | Refills: 1 | Status: SHIPPED | OUTPATIENT
Start: 2024-05-18 | End: 2024-09-15

## 2024-05-18 RX ORDER — ATORVASTATIN CALCIUM 40 MG/1
40 TABLET, FILM COATED ORAL NIGHTLY
Qty: 60 TABLET | Refills: 0 | Status: SHIPPED | OUTPATIENT
Start: 2024-05-18 | End: 2024-05-18

## 2024-05-18 RX ADMIN — CARVEDILOL 3.12 MG: 3.12 TABLET, FILM COATED ORAL at 08:19

## 2024-05-18 RX ADMIN — CLOPIDOGREL BISULFATE 75 MG: 75 TABLET ORAL at 08:19

## 2024-05-18 RX ADMIN — POTASSIUM CHLORIDE 40 MEQ: 1500 TABLET, EXTENDED RELEASE ORAL at 05:54

## 2024-05-18 RX ADMIN — BUDESONIDE AND FORMOTEROL FUMARATE DIHYDRATE 2 PUFF: 160; 4.5 AEROSOL RESPIRATORY (INHALATION) at 09:41

## 2024-05-18 RX ADMIN — HEPARIN SODIUM 2000 UNITS: 1000 INJECTION INTRAVENOUS; SUBCUTANEOUS at 01:01

## 2024-05-18 RX ADMIN — SODIUM CHLORIDE, PRESERVATIVE FREE 10 ML: 5 INJECTION INTRAVENOUS at 08:20

## 2024-05-18 RX ADMIN — TIOTROPIUM BROMIDE INHALATION SPRAY 2 PUFF: 3.12 SPRAY, METERED RESPIRATORY (INHALATION) at 09:39

## 2024-05-18 RX ADMIN — ASPIRIN 81 MG 81 MG: 81 TABLET ORAL at 08:19

## 2024-05-18 RX ADMIN — AMLODIPINE BESYLATE 5 MG: 10 TABLET ORAL at 08:19

## 2024-05-18 NOTE — PLAN OF CARE
Problem: Respiratory - Adult  Goal: Achieves optimal ventilation and oxygenation  Outcome: Progressing  Flowsheets (Taken 5/18/2024 1606)  Achieves optimal ventilation and oxygenation:   Assess for changes in respiratory status   Respiratory therapy support as indicated   Oxygen supplementation based on oxygen saturation or arterial blood gases   Assess and instruct to report shortness of breath or any respiratory difficulty   Assess for changes in mentation and behavior

## 2024-05-18 NOTE — PLAN OF CARE
Problem: Discharge Planning  Goal: Discharge to home or other facility with appropriate resources  5/18/2024 0531 by Nneka Boyle RN  Outcome: Progressing  5/17/2024 2332 by Drea Faust RN  Outcome: Progressing  Flowsheets (Taken 5/17/2024 2000)  Discharge to home or other facility with appropriate resources:   Identify barriers to discharge with patient and caregiver   Arrange for needed discharge resources and transportation as appropriate   Identify discharge learning needs (meds, wound care, etc)   Arrange for interpreters to assist at discharge as needed   Refer to discharge planning if patient needs post-hospital services based on physician order or complex needs related to functional status, cognitive ability or social support system  5/17/2024 1805 by Roxana Rojas RN  Outcome: Progressing     Problem: Pain  Goal: Verbalizes/displays adequate comfort level or baseline comfort level  5/18/2024 0531 by Nneka Boyle RN  Outcome: Progressing  5/17/2024 2332 by Drea Faust RN  Outcome: Progressing  Flowsheets (Taken 5/17/2024 2045)  Verbalizes/displays adequate comfort level or baseline comfort level:   Encourage patient to monitor pain and request assistance   Assess pain using appropriate pain scale   Administer analgesics based on type and severity of pain and evaluate response   Implement non-pharmacological measures as appropriate and evaluate response   Consider cultural and social influences on pain and pain management   Notify Licensed Independent Practitioner if interventions unsuccessful or patient reports new pain  5/17/2024 1805 by Roxana Rojas RN  Outcome: Progressing     Problem: ABCDS Injury Assessment  Goal: Absence of physical injury  5/18/2024 0531 by Nneka Boyle RN  Outcome: Progressing  5/17/2024 2332 by Drea Faust RN  Outcome: Progressing  5/17/2024 1805 by Roxana Rojas RN  Outcome: Progressing     Problem: Risk for Elopement  Goal: Patient will not exit  chronic or comorbid symptoms are exacerbated and prevent overall improvement and discharge  5/17/2024 1805 by Roxana Rojas, RN  Outcome: Progressing     Problem: Nutrition Deficit:  Goal: Optimize nutritional status  5/18/2024 0531 by Nneka Boyle, RN  Outcome: Progressing  5/17/2024 2332 by Drea Faust, RN  Outcome: Progressing  5/17/2024 1805 by Roxana Rojas, RN  Outcome: Progressing

## 2024-05-18 NOTE — PROGRESS NOTES
[facility]  Internal Medicine Teaching Residency Program  Inpatient Daily Progress Note  ______________________________________________________________________________    Patient: Jacky Olivares  YOB: 1950   MRN:[id number]    Acct: [id number]     Room: [address] [place], [place] [address]-  Admit date: 2024  Today's date: 24  Number of days in the hospital: 2    SUBJECTIVE   Admitting Diagnosis: Single subsegmental pulmonary embolism without acute cor pulmonale (HCC)  CC: chest pain    No acute events overnight.  Pt examined at bedside. Chart & results reviewed.   Denies any chest pain.  Saturating on 3 L O2 NC  On heparin infusion.  S/p cardiac cath. Minimal CAD. Showed anomalous RCA. Needs OP CTA.  Labs this morning reviewed. BMP stable. Plt 136      BRIEF HISTORY     The patient is a pleasant 73 y.o. male with a MHx significant for      Hypertension  COPD  Dyslipidemia  GERD     presents with a chief complaint of left-sided chest pain that started this morning, non-radiating, non-reproducible, no aggravating or relieving factors. Patient denies any symptoms of shortness of breath, palpitations, or PND.  No complaints of epigastric pain, nausea or vomiting.  Patient reports intermittent chest pain in the past but today's was worse than before.     Patient reports noncompliance with all the medications, takes them on and off.  At admission in the ED patient was found to be hypertensive, with SBP 160s. He was hypoxic and started on supplemental oxygen 2 L NC. Troponin 15-19-29.  proBNP 57.  CT PE showed small segmental PE in the anterior right upper lobe.    OBJECTIVE     Vital Signs:  BP (!) 121/90   Pulse 89   Temp 98.4 °F (36.9 °C) (Oral)   Resp 20   Ht 1.88 m (6' 2.02\")   Wt 74.9 kg (165 lb 2 oz)   SpO2 92%   BMI 21.19 kg/m²     Temp (24hrs), Av.3 °F (36.8 °C), Min:98 °F (36.7 °C), Max:98.4 °F (36.9 °C)    In: 399.1   Out: 628 [Urine:620]    Physical  g, Daily PRN  acetaminophen, 650 mg, Q6H PRN   Or  acetaminophen, 650 mg, Q6H PRN  albuterol sulfate HFA, 2 puff, 4x Daily PRN  albuterol, 2.5 mg, Q6H PRN  nicotine, 1 patch, Daily PRN  heparin (porcine), 4,000 Units, PRN  heparin (porcine), 2,000 Units, PRN  hydrALAZINE, 10 mg, Q6H PRN        Diagnostic Labs:  CBC:   Recent Labs     05/16/24  0738 05/17/24 0218 05/18/24  0325   WBC 9.5 7.9 7.3   RBC 6.08* 5.71 5.47   HGB 14.0 13.0 12.6*   HCT 44.7 42.8 41.8   MCV 73.5* 75.0* 76.4*   RDW 19.1* 18.7* 18.7*   PLT See Reflexed IPF Result See Reflexed IPF Result See Reflexed IPF Result       BMP:   Recent Labs     05/16/24  0842 05/17/24 0218 05/18/24  0325    139 140   K 3.7 4.0 3.8    105 106   CO2 28 22 25   BUN 13 14 15   CREATININE 1.2 0.9 1.0       BNP: No results for input(s): \"BNP\" in the last 72 hours.  PT/INR:   Recent Labs     05/16/24 1953   PROTIME 13.2   INR 1.0       APTT:   Recent Labs     05/16/24 1953   APTT 29.0       CARDIAC ENZYMES: No results for input(s): \"CKMB\", \"CKMBINDEX\", \"TROPONINI\" in the last 72 hours.    Invalid input(s): \"CKTOTAL;3\"  FASTING LIPID PANEL:  Lab Results   Component Value Date    CHOL 143 05/17/2024    HDL 53 05/17/2024    TRIG 57 05/17/2024     LIVER PROFILE:   Recent Labs     05/17/24 0218   AST 32   ALT 8*   BILIDIR 0.2   BILITOT 0.6   ALKPHOS 51        MICROBIOLOGY:   Lab Results   Component Value Date/Time    CULTURE DUPLICATE ORDER 03/22/2018 11:15 PM    CULTURE  03/22/2018 11:15 PM     [address] [address] ([phone]    CULTURE DUPLICATE ORDER 03/22/2018 11:15 PM    CULTURE  03/22/2018 11:15 PM     [address] [address] [phone]       Imaging:    CT CHEST PULMONARY EMBOLISM W CONTRAST    Result Date: 5/16/2024  1. Small subsegmental pulmonary artery embolism anterior right upper lobe. No CT evidence for right heart strain. 2. No acute airspace disease identified. Findings were

## 2024-05-18 NOTE — PROGRESS NOTES
Home Oxygen Evaluation    Home Oxygen Evaluation completed.    Patient is on 2 liters per minute via NC.  Resting SpO2 = 95%  Resting SpO2 on room air = 91%    SpO2 on room air with exercise = 90-91%    Nocturnal Oximetry with patient on room air is recommended is SpO2 is between 89% and 95% (requires additional order).    Mimi Hernandez RCP  3:18 PM

## 2024-05-18 NOTE — PLAN OF CARE
Problem: Discharge Planning  Goal: Discharge to home or other facility with appropriate resources  5/18/2024 1734 by Savanna Medley RN  Outcome: Completed  5/18/2024 1011 by Savanna Medley RN  Outcome: Progressing  5/18/2024 0531 by Nneka Boyle RN  Outcome: Progressing     Problem: Pain  Goal: Verbalizes/displays adequate comfort level or baseline comfort level  5/18/2024 1734 by Savanna Medley RN  Outcome: Completed  5/18/2024 1011 by Savanna Medley RN  Outcome: Progressing  5/18/2024 0531 by Nneka Boyle RN  Outcome: Progressing     Problem: ABCDS Injury Assessment  Goal: Absence of physical injury  5/18/2024 1734 by Savanna Medley RN  Outcome: Completed  5/18/2024 1011 by Savanna Medley RN  Outcome: Progressing  5/18/2024 0531 by Nneka Boyle RN  Outcome: Progressing     Problem: Risk for Elopement  Goal: Patient will not exit the unit/facility without proper excort  5/18/2024 1734 by Savanna Medley RN  Outcome: Completed  5/18/2024 1011 by Savanna Medley RN  Outcome: Progressing  5/18/2024 0531 by Nneka Boyle RN  Outcome: Progressing  Flowsheets  Taken 5/18/2024 0400 by Nneka Boyle RN  Nursing Interventions for Elopement Risk:   Assist with personal care needs such as toileting, eating, dressing, as needed to reduce the risk of wandering   Collaborate with family members/caregivers to mitigate the elopement risk   Make sure patient has all necessary personal care items  Taken 5/18/2024 0000 by Drea Faust, RN  Nursing Interventions for Elopement Risk:   Assist with personal care needs such as toileting, eating, dressing, as needed to reduce the risk of wandering   Collaborate with family members/caregivers to mitigate the elopement risk   Make sure patient has all necessary personal care items     Problem: Chronic Conditions and Co-morbidities  Goal: Patient's chronic conditions and co-morbidity symptoms are monitored and maintained or improved  5/18/2024 1734 by

## 2024-05-18 NOTE — PLAN OF CARE
Problem: Discharge Planning  Goal: Discharge to home or other facility with appropriate resources  5/18/2024 1011 by Savanna Medley RN  Outcome: Progressing  5/18/2024 0531 by Nneka Boyle RN  Outcome: Progressing  5/17/2024 2332 by Drea Faust RN  Outcome: Progressing  Flowsheets (Taken 5/17/2024 2000)  Discharge to home or other facility with appropriate resources:   Identify barriers to discharge with patient and caregiver   Arrange for needed discharge resources and transportation as appropriate   Identify discharge learning needs (meds, wound care, etc)   Arrange for interpreters to assist at discharge as needed   Refer to discharge planning if patient needs post-hospital services based on physician order or complex needs related to functional status, cognitive ability or social support system     Problem: Pain  Goal: Verbalizes/displays adequate comfort level or baseline comfort level  5/18/2024 1011 by Savanna Medley RN  Outcome: Progressing  5/18/2024 0531 by Nneka Boyle RN  Outcome: Progressing  5/17/2024 2332 by Drea Faust RN  Outcome: Progressing  Flowsheets (Taken 5/17/2024 2045)  Verbalizes/displays adequate comfort level or baseline comfort level:   Encourage patient to monitor pain and request assistance   Assess pain using appropriate pain scale   Administer analgesics based on type and severity of pain and evaluate response   Implement non-pharmacological measures as appropriate and evaluate response   Consider cultural and social influences on pain and pain management   Notify Licensed Independent Practitioner if interventions unsuccessful or patient reports new pain     Problem: ABCDS Injury Assessment  Goal: Absence of physical injury  5/18/2024 1011 by Savanna Medley RN  Outcome: Progressing  5/18/2024 0531 by Nneka Boyle RN  Outcome: Progressing  5/17/2024 2332 by Drea Faust RN  Outcome: Progressing     Problem: Risk for Elopement  Goal: Patient will  not exit the unit/facility without proper excort  5/18/2024 1011 by Savanna Medley RN  Outcome: Progressing  5/18/2024 0531 by Nneka Boyle RN  Outcome: Progressing  Flowsheets  Taken 5/18/2024 0400 by Nneka Boyle RN  Nursing Interventions for Elopement Risk:   Assist with personal care needs such as toileting, eating, dressing, as needed to reduce the risk of wandering   Collaborate with family members/caregivers to mitigate the elopement risk   Make sure patient has all necessary personal care items  Taken 5/18/2024 0000 by Drea Faust RN  Nursing Interventions for Elopement Risk:   Assist with personal care needs such as toileting, eating, dressing, as needed to reduce the risk of wandering   Collaborate with family members/caregivers to mitigate the elopement risk   Make sure patient has all necessary personal care items  5/17/2024 2332 by Drea Faust RN  Outcome: Progressing  Flowsheets (Taken 5/17/2024 2000)  Nursing Interventions for Elopement Risk:   Assist with personal care needs such as toileting, eating, dressing, as needed to reduce the risk of wandering   Collaborate with family members/caregivers to mitigate the elopement risk   Make sure patient has all necessary personal care items     Problem: Chronic Conditions and Co-morbidities  Goal: Patient's chronic conditions and co-morbidity symptoms are monitored and maintained or improved  5/18/2024 1011 by Savanna Medley RN  Outcome: Progressing  5/18/2024 0531 by Nneka Boyle RN  Outcome: Progressing  5/17/2024 2332 by Drea Faust RN  Outcome: Progressing  Flowsheets (Taken 5/17/2024 2000)  Care Plan - Patient's Chronic Conditions and Co-Morbidity Symptoms are Monitored and Maintained or Improved:   Monitor and assess patient's chronic conditions and comorbid symptoms for stability, deterioration, or improvement   Collaborate with multidisciplinary team to address chronic and comorbid conditions and prevent

## 2024-05-18 NOTE — DISCHARGE INSTRUCTIONS
You are admitted to the hospital with chest pain and found to have a clot in the lung.  You were started on blood thinner eliquis.  Start taking Eliquis 10 mg BID for 7 days and followed by Eliquis 5 mg BID  Your blood pressure medications were adjusted.  Stop taking Toprol-XL.  Start taking Coreg 3.125 Mg 2 times a day.  Continue to use your old medications as prescribed  You underwent cardiac cath which showed mild disease.  You were found to have an abnormal origin of the blood vessel of heart.   Please follow-up with cardiology for further workup in 2 weeks.  Follow-up with PCP in 1 week.  In case of worsening symptoms or new symptoms arise, please visit ER.  Please get an Echocardiogram outpatient

## 2024-05-18 NOTE — PLAN OF CARE
Problem: Discharge Planning  Goal: Discharge to home or other facility with appropriate resources  5/17/2024 2332 by Drea Faust RN  Outcome: Progressing  Flowsheets (Taken 5/17/2024 2000)  Discharge to home or other facility with appropriate resources:   Identify barriers to discharge with patient and caregiver   Arrange for needed discharge resources and transportation as appropriate   Identify discharge learning needs (meds, wound care, etc)   Arrange for interpreters to assist at discharge as needed   Refer to discharge planning if patient needs post-hospital services based on physician order or complex needs related to functional status, cognitive ability or social support system  5/17/2024 1805 by Roxana Rojas RN  Outcome: Progressing     Problem: Pain  Goal: Verbalizes/displays adequate comfort level or baseline comfort level  5/17/2024 2332 by Drea Faust RN  Outcome: Progressing  Flowsheets (Taken 5/17/2024 2045)  Verbalizes/displays adequate comfort level or baseline comfort level:   Encourage patient to monitor pain and request assistance   Assess pain using appropriate pain scale   Administer analgesics based on type and severity of pain and evaluate response   Implement non-pharmacological measures as appropriate and evaluate response   Consider cultural and social influences on pain and pain management   Notify Licensed Independent Practitioner if interventions unsuccessful or patient reports new pain  5/17/2024 1805 by Roxana Rojas RN  Outcome: Progressing     Problem: ABCDS Injury Assessment  Goal: Absence of physical injury  5/17/2024 2332 by Drea Faust, RN  Outcome: Progressing  5/17/2024 1805 by Roxana Rojas RN  Outcome: Progressing     Problem: Risk for Elopement  Goal: Patient will not exit the unit/facility without proper excort  5/17/2024 2332 by Drea Faust, RN  Outcome: Progressing  Flowsheets (Taken 5/17/2024 2000)  Nursing Interventions for Elopement Risk:   Assist

## 2024-05-19 NOTE — DISCHARGE SUMMARY
[facility]     Department of Internal Medicine - Staff Internal Medicine Teaching Service    INPATIENT DISCHARGE SUMMARY      Patient Identification:  Jacky Olivares is a 73 y.o. male.  :  1950  MRN: [id number]     Acct: [id number]   PCP: Jenise Song APRN - CNP  Admit Date:  2024  Discharge date and time: 2024  5:49 PM   Attending Provider: No att. providers found                                     ACTIVE DISCHARGE DIAGNOSES     Hospital Problem Lists:  Principal Problem:    Single subsegmental pulmonary embolism without acute cor pulmonale (HCC)  Active Problems:    Chronic obstructive pulmonary disease (HCC)    Smoker    Chest pain    Acute respiratory failure with hypoxia and hypercapnia (HCC)    Elevated troponin    Hypertensive urgency    Noncompliance with medication regimen    Homeless    Severe malnutrition (HCC)    ACS (acute coronary syndrome) (HCC)    NSTEMI (non-ST elevated myocardial infarction) (HCC)  Resolved Problems:    * No resolved hospital problems. *      HOSPITAL STAY     Brief Inpatient course:   Jacky Olivares is a 73 y.o. male with history of hypertension, COPD who was admitted for the management of Single subsegmental pulmonary embolism without acute cor pulmonale (HCC), presented to the emergency department with left sided chest pain. He was loaded with aspirin and lipitor. At admission in the ED patient was found to be hypertensive, with SBP 160s. He was hypoxic and started on supplemental oxygen 2 L NC. Troponin 15-19-29. proBNP 57. CT PE showed small segmental PE in the anterior right upper lobe. He received therapeutic Lovenox. During the hospital stay, his troponin went up to 421. Patient was started on heparin infusion. Cardiology was consulted and he underwent cardiac cath which showed minimal CAD. His BP improved with oral anti hypertensive's. Heparin infusion switched to eliquis. He is medically stable and is being discharged with

## 2024-05-20 ENCOUNTER — TELEPHONE (OUTPATIENT)
Dept: PRIMARY CARE CLINIC | Age: 74
End: 2024-05-20

## 2024-05-20 NOTE — TELEPHONE ENCOUNTER
Care Transitions Initial Follow Up Call    Outreach made within 2 business days of discharge: Yes    Patient: Jacky Olivares Patient : 1950   MRN: [id number]  Reason for Admission: There are no discharge diagnoses documented for the most recent discharge.  Discharge Date: 24       Spoke with: already scheduled     Discharge department/facility: [facility] Interactive Patient Contact:  Was patient able to fill all prescriptions:   Was patient instructed to bring all medications to the follow-up visit:   Is patient taking all medications as directed in the discharge summary?   Does patient understand their discharge instructions:   Does patient have questions or concerns that need addressed prior to 7-14 day follow up office visit:     Scheduled appointment with PCP within 7-14 days    Follow Up  Future Appointments   Date Time Provider Department Center   2024 11:00 AM [facility]   2024  1:30 PM Darlene Diaz APRN - NP [facility] JESSICA Lou [place]

## 2024-05-28 ENCOUNTER — OFFICE VISIT (OUTPATIENT)
Dept: PRIMARY CARE CLINIC | Age: 74
End: 2024-05-28
Payer: COMMERCIAL

## 2024-05-28 ENCOUNTER — TELEPHONE (OUTPATIENT)
Dept: PRIMARY CARE CLINIC | Age: 74
End: 2024-05-28

## 2024-05-28 VITALS
WEIGHT: 170.8 LBS | OXYGEN SATURATION: 93 % | DIASTOLIC BLOOD PRESSURE: 85 MMHG | SYSTOLIC BLOOD PRESSURE: 129 MMHG | BODY MASS INDEX: 21.92 KG/M2 | HEIGHT: 74 IN | HEART RATE: 63 BPM

## 2024-05-28 DIAGNOSIS — I26.93 SINGLE SUBSEGMENTAL PULMONARY EMBOLISM WITHOUT ACUTE COR PULMONALE (HCC): ICD-10-CM

## 2024-05-28 DIAGNOSIS — I10 PRIMARY HYPERTENSION: ICD-10-CM

## 2024-05-28 DIAGNOSIS — J41.0 SIMPLE CHRONIC BRONCHITIS (HCC): ICD-10-CM

## 2024-05-28 DIAGNOSIS — I25.10 CORONARY ARTERY DISEASE INVOLVING NATIVE HEART WITHOUT ANGINA PECTORIS, UNSPECIFIED VESSEL OR LESION TYPE: ICD-10-CM

## 2024-05-28 DIAGNOSIS — Z09 HOSPITAL DISCHARGE FOLLOW-UP: Primary | ICD-10-CM

## 2024-05-28 DIAGNOSIS — Z59.86 FINANCIAL INSECURITY: ICD-10-CM

## 2024-05-28 PROCEDURE — 3079F DIAST BP 80-89 MM HG: CPT | Performed by: NURSE PRACTITIONER

## 2024-05-28 PROCEDURE — 1123F ACP DISCUSS/DSCN MKR DOCD: CPT | Performed by: NURSE PRACTITIONER

## 2024-05-28 PROCEDURE — 99214 OFFICE O/P EST MOD 30 MIN: CPT | Performed by: NURSE PRACTITIONER

## 2024-05-28 PROCEDURE — 1111F DSCHRG MED/CURRENT MED MERGE: CPT | Performed by: NURSE PRACTITIONER

## 2024-05-28 PROCEDURE — 3074F SYST BP LT 130 MM HG: CPT | Performed by: NURSE PRACTITIONER

## 2024-05-28 RX ORDER — BUDESONIDE AND FORMOTEROL FUMARATE DIHYDRATE 160; 4.5 UG/1; UG/1
2 AEROSOL RESPIRATORY (INHALATION) 2 TIMES DAILY
Qty: 2 EACH | Refills: 0 | Status: SHIPPED | OUTPATIENT
Start: 2024-05-28 | End: 2024-06-27

## 2024-05-28 RX ORDER — CARVEDILOL 3.12 MG/1
3.12 TABLET ORAL 2 TIMES DAILY WITH MEALS
Qty: 60 TABLET | Refills: 2 | Status: SHIPPED | OUTPATIENT
Start: 2024-05-28 | End: 2024-08-26

## 2024-05-28 RX ORDER — TIOTROPIUM BROMIDE 18 UG/1
18 CAPSULE ORAL; RESPIRATORY (INHALATION) DAILY
Qty: 30 CAPSULE | Refills: 3 | Status: SHIPPED | OUTPATIENT
Start: 2024-05-28

## 2024-05-28 RX ORDER — ATORVASTATIN CALCIUM 40 MG/1
40 TABLET, FILM COATED ORAL NIGHTLY
Qty: 30 TABLET | Refills: 2 | Status: SHIPPED | OUTPATIENT
Start: 2024-05-28 | End: 2024-08-26

## 2024-05-28 RX ORDER — CLOPIDOGREL BISULFATE 75 MG/1
75 TABLET ORAL DAILY
Qty: 30 TABLET | Refills: 2 | Status: SHIPPED | OUTPATIENT
Start: 2024-05-28 | End: 2024-08-26

## 2024-05-28 RX ORDER — ASPIRIN 81 MG/1
81 TABLET, CHEWABLE ORAL DAILY
Qty: 30 TABLET | Refills: 2 | Status: SHIPPED | OUTPATIENT
Start: 2024-05-28 | End: 2024-08-26

## 2024-05-28 SDOH — ECONOMIC STABILITY - INCOME SECURITY: FINANCIAL INSECURITY: Z59.86

## 2024-05-28 ASSESSMENT — ENCOUNTER SYMPTOMS
CHEST TIGHTNESS: 0
SINUS PRESSURE: 0
SORE THROAT: 0
COUGH: 0
BACK PAIN: 0
ABDOMINAL PAIN: 0
NAUSEA: 0
VOMITING: 0
COLOR CHANGE: 0
PHOTOPHOBIA: 0
SINUS PAIN: 0
DIARRHEA: 0
SHORTNESS OF BREATH: 0

## 2024-05-28 NOTE — PATIENT INSTRUCTIONS
[facility] Transportation Resources*  (Call 211 if need more resources.)     [place]:  What they offer: Medical Appointments Transportation  Phone Number: ([phone] ext: 101      [place]  What they offer: Senior Ride Programs  Phone Number: ([phone] [phone]    Website:     [place]:  What they offer: Transportation  Phone Number: [phone]    [place]:  What they offer: Transportation, 4-64 years old $4, 65 and older $2  Phone Number: [phone]    Find A Ride:  What they offer: Program is for 60 years and older/under 60 with disability  Phone Number: 1-491.320.4775 Call Center open 8-4:30pm    [facility] Transit:  What they offer: Senior Ride Programs  Phone Number: [phone]([place]); [phone] ([place])    [facility] Transport([employer]):  What they offer: Transportation for [facility].  Out of county rides require 72 hour notice.   Phone Number: [phone]([place]); [phone] ([place])    [place]:  What they offer: Public transportation, disability transportation (TARPS)  Phone Number: 843-949-LXZL(8962); [phone] ([place])    Area Office on Aging of [facility] ([facility]):  What they offer: Medical cab rides for seniors and referral to community resources  Phone Number: [phone]     Area Agency on Aging, District [place]:    [place], [place], [place], [place], [place], [place], [place], [place], [facility]:  What they offer: Referral to transportation and other resources for seniors.  Phone Number: [phone]     [place] Community Action Partnership (GLCAP):   [place], [place], [place], [place], [place], [place], [place], [place],      [place]  What they offer: referral to transportation and other resources.  Phone Number: [phone]     [facility] Community Action Commission (NOCAC):   [place], [place], [place], and [place] counites  What they offer: referral to community transportation and

## 2024-05-28 NOTE — PROGRESS NOTES
Jacky Olivares (:  1950) is a 73 y.o. male,Established patient, here for evaluation of the following chief complaint(s):  Follow-Up from Hospital      Assessment & Plan   1. Hospital discharge follow-up  -     [place] DISCHARGE MEDS RECONCILED W/ CURRENT OUTPATIENT MED LIST  2. Single subsegmental pulmonary embolism without acute cor pulmonale (HCC)  -     apixaban (ELIQUIS) 5 MG TABS tablet; Take 1 tablet by mouth 2 times daily, Disp-60 tablet, R-2Normal  3. Coronary artery disease involving native heart without angina pectoris, unspecified vessel or lesion type  -     aspirin 81 MG chewable tablet; Take 1 tablet by mouth daily, Disp-30 tablet, R-2Normal  -     atorvastatin (LIPITOR) 40 MG tablet; Take 1 tablet by mouth nightly, Disp-30 tablet, R-2Normal  -     carvedilol (COREG) 3.125 MG tablet; Take 1 tablet by mouth 2 times daily (with meals), Disp-60 tablet, R-2Normal  -     clopidogrel (PLAVIX) 75 MG tablet; Take 1 tablet by mouth daily, Disp-30 tablet, R-2Normal  4. Primary hypertension  5. Simple chronic bronchitis (HCC)  -     budesonide-formoterol (SYMBICORT) 160-4.5 MCG/ACT AERO; Inhale 2 puffs into the lungs 2 times daily, Disp-2 each, R-0Normal  -     tiotropium (SPIRIVA HANDIHALER) 18 MCG inhalation capsule; Inhale 1 capsule into the lungs daily, Disp-30 capsule, R-3Normal  6. Financial insecurity  -     [facility] Referral for Social Determinants of Health (Primary Care Practices)      Return in about 3 months (around 2024).       HPI    Here for hospital follow up:    Admitted with chest pain and hypoxia-- secondary to subsegmental PE.  Also underwent cardiac cath and identified to have mild multivessel CAD.  Evaluated by cardiology. Started on aspirin, statin, plavix and bb.    Overall patient states he is feeling better. Tells me he did not  any of his medications after hospital discharge due to cost.  Further admits to being homeless over the last 3 months.  Secured housing over the

## 2024-05-28 NOTE — TELEPHONE ENCOUNTER
Jacky Olivares was contacted by Allison Pandey MA, a Community Health Navigator, regarding a Social Determinants of Health referral.     Phone number listed for patient is incorrect or nonfunctioning.    Follow-up attempt.     KALYANI

## 2024-05-28 NOTE — TELEPHONE ENCOUNTER
Please let patient know I verified cost of medications with [facility] on [address].  His eliquis is roughly $11 for 30 day supply.  I discontinued the trelegy and went back to his previus inhalers.  I called our outpatient pharmacy here to see if there were any voucher programs available-- and unfortunately, there are not.  The risk for patient being without his eliquis is stroke, MI, death as discussed in office.  If there is any way he can  the eliquis and hold on the remaining medications next week-- it would be beneficial.  However, I understand his current situation and would advise getting medications as soon as he is able. Again, [facility] will be reaching out to him as well.

## 2024-05-29 NOTE — TELEPHONE ENCOUNTER
Jacky Olivares was contacted by a Community Health Navigator to discuss a referral for SDOH related needs.     Writer spoke with: Patient and explained the services and assistance that can be provided by a Community Health Navigator.     Patient agreeable to receiving resources and support from writer.     Intake Notes: Writer spoke with patient regarding current SDOH Needs, patient states he needs assistance getting his medications but he is unable to afford them. Patient is unable to tell writer which medications he needs assistance with but that he needs his inhalers. Due to patient insurance he will have some cost with his medications. Patient states he just moved into a home and has limited income. Writer will research medications and the cost to determine which ones to send to provider to order and send to the pharmacy.    Actions to be completed by writer:  see above    Actions to be completed by patient:  see above      Allison Pandey MA

## 2024-06-04 NOTE — TELEPHONE ENCOUNTER
Jacky Olivares was contacted by Allison Pandey MA, a Community Health Navigator, regarding a Social Determinants of Health referral.     A message was left with the writer's contact information.    Writer placed phone call to patient to verify if he was able to  medications from [facility]. Patient stated previously he had to wait until the first of the month.

## 2024-06-07 NOTE — TELEPHONE ENCOUNTER
Jacky Olivares was contacted by a Community Health Navigator for follow-up regarding SDOH-related needs.     Writer spoke with: Patient    Progress Notes: Writer spoke with patient to verify if medication was picked up from the pharmacy. Patient states he is unable to  medication until Tuesday. Patient states he has not picked up Eliquis at this time but he bought over the counter Asprin. Writer expressed to patient the importance of picking up that medication. Writer spoke with patient regarding getting  through insurance company and patient was agreeable. Writer will followup with patient after contacting insurance company.    Actions to be completed by writer:  see above    Actions to be completed by patient:  pickup Eliquis as soon as possible.    Allison Pandey MA

## 2024-06-10 NOTE — TELEPHONE ENCOUNTER
Jacky Olivares was contacted by a Community Health Navigator for follow-up regarding SDOH-related needs.     Writer spoke with: Patient    Progress Notes: Writer spoke with patient regarding current needs, writer set patient up for $50 dollar fruit and vegetable card through [facility],that will mail the card to the patient. Writer will mail patient food pantry list to visit local food pantries. Writer called and put in request for care manager through [facility] at [phone] which turn around time is a couple of weeks. Patient was also given the phone number to followup. Patient will pickup medications when the funds are available, writer unable to find additional resources. Patient has no further needs at this time.    Actions to be completed by writer: Close referral.    Actions to be completed by patient:  check status of  and look out for $50 card from [facility].    Allison Pandey MA

## 2024-06-12 DIAGNOSIS — J41.0 SIMPLE CHRONIC BRONCHITIS (HCC): ICD-10-CM

## 2024-06-12 RX ORDER — FLUTICASONE PROPIONATE AND SALMETEROL 100; 50 UG/1; UG/1
1 POWDER RESPIRATORY (INHALATION) EVERY 12 HOURS
Qty: 1 EACH | Refills: 3 | Status: SHIPPED | OUTPATIENT
Start: 2024-06-12

## 2024-06-12 RX ORDER — ALBUTEROL SULFATE 2.5 MG/3ML
2.5 SOLUTION RESPIRATORY (INHALATION) EVERY 6 HOURS PRN
Qty: 120 ML | Refills: 3 | Status: SHIPPED | OUTPATIENT
Start: 2024-06-12

## 2024-06-12 NOTE — TELEPHONE ENCOUNTER
Spoke with patient. Read him the note about each medication. Informed him that he will have 3 meds to  today and his atorvastatin will be ready next week because its not due today. He understood and thanks us all for the time in helping him.     I did inform him if he has too high of copay with any other meds to call and let us know so we can see if there is an alternative. He understood the cost associated with the symbicort.

## 2024-06-12 NOTE — TELEPHONE ENCOUNTER
Lov 5/28/24      Pt called in to clarify medications he is supposed to be taking. Ran through the 5/28/24 med list that we sent over. Pt did not receive: spiriva, symbicort, atorvastatin, or aspirin from [facility]. Is unsure why he did not get those. I called [facility] and they are closed right now. Will try back once it opens.     Routing refill for albuterol solution. Pt states he only has 2 bullets left for his machine.

## 2024-06-12 NOTE — TELEPHONE ENCOUNTER
Pt did receive his eliquis.       Called and spoke with pharmacist at [facility] on [address].       She was able to tell me the following:    Spiriva: OOP cost is $200. Covered alternatives are breo ellipta or advair. Did you want to switch?    Symbicort: OOP cost is $47/month; she did not have an alternative listed, but did you want to switch to another medication?    Atorvastatin: not due for refill until 6/19/24    Aspirin: they tried calling patient multiple times for 10 days to , but he did not , so they put back on shelf. They will refill it now for him and await response from PCP on the spiriva and symbicort.       I can call patient back once resolved to let him know what to do.

## 2024-06-15 PROBLEM — R79.89 ELEVATED TROPONIN: Status: RESOLVED | Noted: 2024-05-16 | Resolved: 2024-06-15

## 2024-08-28 ENCOUNTER — OFFICE VISIT (OUTPATIENT)
Dept: PRIMARY CARE CLINIC | Age: 74
End: 2024-08-28
Payer: COMMERCIAL

## 2024-08-28 VITALS
WEIGHT: 159.6 LBS | SYSTOLIC BLOOD PRESSURE: 155 MMHG | HEIGHT: 74 IN | BODY MASS INDEX: 20.48 KG/M2 | HEART RATE: 93 BPM | OXYGEN SATURATION: 91 % | DIASTOLIC BLOOD PRESSURE: 95 MMHG

## 2024-08-28 DIAGNOSIS — I25.10 CORONARY ARTERY DISEASE INVOLVING NATIVE HEART WITHOUT ANGINA PECTORIS, UNSPECIFIED VESSEL OR LESION TYPE: ICD-10-CM

## 2024-08-28 DIAGNOSIS — I26.93 SINGLE SUBSEGMENTAL PULMONARY EMBOLISM WITHOUT ACUTE COR PULMONALE (HCC): ICD-10-CM

## 2024-08-28 DIAGNOSIS — I10 PRIMARY HYPERTENSION: ICD-10-CM

## 2024-08-28 DIAGNOSIS — J41.0 SIMPLE CHRONIC BRONCHITIS (HCC): ICD-10-CM

## 2024-08-28 DIAGNOSIS — Z59.00 HOMELESS: Primary | ICD-10-CM

## 2024-08-28 PROCEDURE — 3080F DIAST BP >= 90 MM HG: CPT | Performed by: NURSE PRACTITIONER

## 2024-08-28 PROCEDURE — 3077F SYST BP >= 140 MM HG: CPT | Performed by: NURSE PRACTITIONER

## 2024-08-28 PROCEDURE — 1123F ACP DISCUSS/DSCN MKR DOCD: CPT | Performed by: NURSE PRACTITIONER

## 2024-08-28 PROCEDURE — 99214 OFFICE O/P EST MOD 30 MIN: CPT | Performed by: NURSE PRACTITIONER

## 2024-08-28 RX ORDER — CARVEDILOL 3.12 MG/1
3.12 TABLET ORAL 2 TIMES DAILY WITH MEALS
Qty: 60 TABLET | Refills: 2 | Status: SHIPPED | OUTPATIENT
Start: 2024-08-28 | End: 2024-11-26

## 2024-08-28 RX ORDER — CLOPIDOGREL BISULFATE 75 MG/1
75 TABLET ORAL DAILY
Qty: 30 TABLET | Refills: 2 | Status: SHIPPED | OUTPATIENT
Start: 2024-08-28 | End: 2024-11-26

## 2024-08-28 RX ORDER — ATORVASTATIN CALCIUM 40 MG/1
40 TABLET, FILM COATED ORAL NIGHTLY
Qty: 30 TABLET | Refills: 2 | Status: SHIPPED | OUTPATIENT
Start: 2024-08-28 | End: 2024-11-26

## 2024-08-28 RX ORDER — FLUTICASONE PROPIONATE AND SALMETEROL 100; 50 UG/1; UG/1
1 POWDER RESPIRATORY (INHALATION) EVERY 12 HOURS
Qty: 1 EACH | Refills: 3 | Status: SHIPPED | OUTPATIENT
Start: 2024-08-28

## 2024-08-28 RX ORDER — ASPIRIN 81 MG/1
81 TABLET, CHEWABLE ORAL DAILY
Qty: 30 TABLET | Refills: 2 | Status: SHIPPED | OUTPATIENT
Start: 2024-08-28 | End: 2024-11-26

## 2024-08-28 RX ORDER — TIOTROPIUM BROMIDE 18 UG/1
18 CAPSULE ORAL; RESPIRATORY (INHALATION) DAILY
Qty: 30 CAPSULE | Refills: 3 | Status: SHIPPED | OUTPATIENT
Start: 2024-08-28

## 2024-08-28 RX ORDER — AMLODIPINE BESYLATE 5 MG/1
5 TABLET ORAL DAILY
Qty: 90 TABLET | Refills: 1 | Status: SHIPPED | OUTPATIENT
Start: 2024-08-28 | End: 2025-02-24

## 2024-08-28 RX ORDER — BUDESONIDE AND FORMOTEROL FUMARATE DIHYDRATE 160; 4.5 UG/1; UG/1
2 AEROSOL RESPIRATORY (INHALATION) 2 TIMES DAILY
Qty: 2 EACH | Refills: 0 | Status: SHIPPED | OUTPATIENT
Start: 2024-08-28 | End: 2024-09-27

## 2024-08-28 RX ORDER — ALBUTEROL SULFATE 90 UG/1
2 AEROSOL, METERED RESPIRATORY (INHALATION) 4 TIMES DAILY PRN
Qty: 1 EACH | Refills: 5 | Status: SHIPPED | OUTPATIENT
Start: 2024-08-28

## 2024-08-28 SDOH — ECONOMIC STABILITY - HOUSING INSECURITY: HOMELESSNESS UNSPECIFIED: Z59.00

## 2024-08-28 ASSESSMENT — ENCOUNTER SYMPTOMS
VOMITING: 0
SINUS PAIN: 0
CHEST TIGHTNESS: 0
SHORTNESS OF BREATH: 0
SINUS PRESSURE: 0
COLOR CHANGE: 0
PHOTOPHOBIA: 0
NAUSEA: 0
COUGH: 0
DIARRHEA: 0
ABDOMINAL PAIN: 0
SORE THROAT: 0
BACK PAIN: 0

## 2024-08-28 NOTE — ASSESSMENT & PLAN NOTE
Unclear control, continue current medications    Orders:    apixaban (ELIQUIS) 5 MG TABS tablet; Take 1 tablet by mouth 2 times daily

## 2024-08-28 NOTE — PROGRESS NOTES
Jacky Olivares (:  1950) is a 73 y.o. male,Established patient, here for evaluation of the following chief complaint(s):  Follow-up (3mo f/u)         Assessment & Plan  Homeless    [facility] referral placed    Orders:    [facility]erral for Social Determinants of Health (Primary Care Practices)    Primary hypertension   Uncontrolled, continue current medications    Orders:    amLODIPine (NORVASC) 5 MG tablet; Take 1 tablet by mouth daily    Single subsegmental pulmonary embolism without acute cor pulmonale (HCC)   Unclear control, continue current medications    Orders:    apixaban (ELIQUIS) 5 MG TABS tablet; Take 1 tablet by mouth 2 times daily    Coronary artery disease involving native heart without angina pectoris, unspecified vessel or lesion type   Well-controlled, continue current medications    Orders:    aspirin 81 MG chewable tablet; Take 1 tablet by mouth daily    atorvastatin (LIPITOR) 40 MG tablet; Take 1 tablet by mouth nightly    carvedilol (COREG) 3.125 MG tablet; Take 1 tablet by mouth 2 times daily (with meals)    clopidogrel (PLAVIX) 75 MG tablet; Take 1 tablet by mouth daily    Simple chronic bronchitis (HCC)   Borderline controlled, continue current medications    Orders:    budesonide-formoterol (SYMBICORT) 160-4.5 MCG/ACT AERO; Inhale 2 puffs into the lungs 2 times daily    fluticasone-salmeterol (ADVAIR DISKUS) 100-50 MCG/ACT AEPB diskus inhaler; Inhale 1 puff into the lungs in the morning and 1 puff in the evening.    tiotropium (SPIRIVA HANDIHALER) 18 MCG inhalation capsule; Inhale 1 capsule into the lungs daily    albuterol sulfate HFA (PROVENTIL;VENTOLIN;PROAIR) 108 (90 Base) MCG/ACT inhaler; Inhale 2 puffs into the lungs 4 times daily as needed for Wheezing      Return in about 3 months (around 2024).       Subjective   HPI    Here after several month hiatus.    Has not had medications in 6 months.  States he has been living in his car without a steady home.  Occasionally  stays at his sisters house.  Not working consistently and unable to pay for meds given his above circumstances.  Food insecurity as well given the financial constraints.  Occasionally able to purchase food at the dollar tree.  Picking up spare work.    Has increased smoking to 2ppd. Cessation encouraged. He has history of PE and has been off eliquis for several months and also has history of pulmonary nodules.  Due for annual lung screening again 3/2025.  Denies any increase in cough, hemoptysis or increased difficulty breathing.    Weight loss:Believes this to be from inconsistent food and increased stress.  Workup has been negative this far.  He denies any ABD pain, n/v/d or blood in the stool.  He is overdue for CRC.  Has been putting it off for months.  Agree to discuss again on our next visit.     COPD:  At baseline.  Faint expiratory wheezing posteriorly on exam.  No recent hospitalizations and no recent need for steroids/abx.  Refill inhalers. Smoking cessation encourage.     HTN: elevated today.  As above, out of medications.  Will resume.      SDOH referral placed.  Patient agreeable.    RTC 3 months     Review of Systems   Constitutional:  Negative for activity change, appetite change and fever.   HENT:  Negative for sinus pressure, sinus pain and sore throat.    Eyes:  Negative for photophobia and visual disturbance.   Respiratory:  Negative for cough, chest tightness and shortness of breath.    Cardiovascular:  Negative for chest pain.   Gastrointestinal:  Negative for abdominal pain, diarrhea, nausea and vomiting.   Endocrine: Negative for polydipsia and polyuria.   Genitourinary:  Negative for difficulty urinating.   Musculoskeletal:  Negative for back pain and neck pain.   Skin:  Negative for color change.   Neurological:  Negative for dizziness, light-headedness and headaches.   Psychiatric/Behavioral:  Negative for behavioral problems.           Objective   Physical Exam  Vitals and nursing note

## 2024-08-28 NOTE — ASSESSMENT & PLAN NOTE
Uncontrolled, continue current medications    Orders:    amLODIPine (NORVASC) 5 MG tablet; Take 1 tablet by mouth daily

## 2024-08-28 NOTE — ASSESSMENT & PLAN NOTE
[facility] referral placed    Orders:    [facility] for Social Determinants of Health (Primary Care Practices)

## 2024-08-28 NOTE — ASSESSMENT & PLAN NOTE
Borderline controlled, continue current medications    Orders:    budesonide-formoterol (SYMBICORT) 160-4.5 MCG/ACT AERO; Inhale 2 puffs into the lungs 2 times daily    fluticasone-salmeterol (ADVAIR DISKUS) 100-50 MCG/ACT AEPB diskus inhaler; Inhale 1 puff into the lungs in the morning and 1 puff in the evening.    tiotropium (SPIRIVA HANDIHALER) 18 MCG inhalation capsule; Inhale 1 capsule into the lungs daily    albuterol sulfate HFA (PROVENTIL;VENTOLIN;PROAIR) 108 (90 Base) MCG/ACT inhaler; Inhale 2 puffs into the lungs 4 times daily as needed for Wheezing

## 2024-09-03 ENCOUNTER — TELEPHONE (OUTPATIENT)
Dept: PRIMARY CARE CLINIC | Age: 74
End: 2024-09-03

## 2024-09-03 NOTE — TELEPHONE ENCOUNTER
Jacky Olivares was contacted by a Community Health Navigator to discuss a referral for SDOH related needs.     Writer spoke with: Patient and explained the services and assistance that can be provided by a Community Health Navigator.     Patient agreeable to receiving resources and support from writer.     Intake Notes: Writer spoke with patient and he stated he has not taken his medication due to cost for the last six months. Writer asked why he has not purchased his meds with his social security and patient did not have any answers. Patient stated he did not followup with obtaining  through insurance because his wife kicked him out the house. 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 Writer explained to patient that she is unable to assist with different resources. Patient stated he can make do.    Actions to be completed by writer: Close referral.    Actions to be completed by patient: Close referral.      Allison Pandey MA

## 2024-09-24 ENCOUNTER — APPOINTMENT (OUTPATIENT)
Dept: CT IMAGING | Age: 74
End: 2024-09-24
Payer: COMMERCIAL

## 2024-09-24 ENCOUNTER — HOSPITAL ENCOUNTER (EMERGENCY)
Age: 74
Discharge: HOME | End: 2024-09-24
Attending: EMERGENCY MEDICINE
Payer: COMMERCIAL

## 2024-09-24 VITALS
HEIGHT: 74 IN | TEMPERATURE: 99.7 F | HEART RATE: 116 BPM | RESPIRATION RATE: 18 BRPM | OXYGEN SATURATION: 95 % | BODY MASS INDEX: 20.02 KG/M2 | DIASTOLIC BLOOD PRESSURE: 94 MMHG | SYSTOLIC BLOOD PRESSURE: 150 MMHG | WEIGHT: 156 LBS

## 2024-09-24 DIAGNOSIS — S01.01XA LACERATION OF SCALP, INITIAL ENCOUNTER: Primary | ICD-10-CM

## 2024-09-24 PROCEDURE — 99284 EMERGENCY DEPT VISIT MOD MDM: CPT

## 2024-09-24 PROCEDURE — 70450 CT HEAD/BRAIN W/O DYE: CPT

## 2024-09-24 PROCEDURE — 12001 RPR S/N/AX/GEN/TRNK 2.5CM/<: CPT

## 2024-09-24 ASSESSMENT — LIFESTYLE VARIABLES
HOW OFTEN DO YOU HAVE A DRINK CONTAINING ALCOHOL: NEVER
HOW MANY STANDARD DRINKS CONTAINING ALCOHOL DO YOU HAVE ON A TYPICAL DAY: PATIENT DOES NOT DRINK

## 2024-09-24 ASSESSMENT — PAIN SCALES - GENERAL: PAINLEVEL_OUTOF10: 4

## 2024-09-24 ASSESSMENT — PAIN - FUNCTIONAL ASSESSMENT: PAIN_FUNCTIONAL_ASSESSMENT: 0-10

## 2024-09-24 ASSESSMENT — PAIN DESCRIPTION - LOCATION: LOCATION: HEAD

## 2024-09-24 NOTE — ED PROVIDER NOTES
[facility] ED  Emergency Department Encounter  Emergency Medicine Resident     Pt Name:Jacky Olivares  MRN: [id number]  Birthdate 1950  Date of evaluation: 9/24/24  PCP:  Jenise Song APRN - CNP  Note Started: 12:10 PM EDT      CHIEF COMPLAINT       Chief Complaint   Patient presents with    Head Laceration     On truck dc        HISTORY OF PRESENT ILLNESS  (Location/Symptom, Timing/Onset, Context/Setting, Quality, Duration, Modifying Factors, Severity.)      Jacky Olivares is a 73 y.o. male who presents after hitting his head on the top of his tailgate to his car.  Patient reports that he had bent down to  his laundry to load into the back of his car, did not realize how tall the tailgate was as this is new car, and hit his head on the tailgate he denies loss of consciousness, but endorses pain and had to \"take a minute\" because of the pain.  He did not fall, nor did he hit any other part of his body.  He reports that there was a lot of blood at first, but that has since stopped.  Of note, patient is supposed be on blood thinners and antihypertensives, but has not been taking it over the past few months due to limited finances and the inability to pick this up.     PAST MEDICAL / SURGICAL / SOCIAL / FAMILY HISTORY      has a past medical history of Arthritis, CAD (coronary artery disease), Headache(784.0), Hyperlipidemia, Hypertension, Pneumonia, and Unspecified cerebral artery occlusion with cerebral infarction.     has a past surgical history that includes fracture surgery and Cardiac procedure (N/A, 5/17/2024).    Social History     Socioeconomic History    Marital status:      Spouse name: Not on file    Number of children: Not on file    Years of education: Not on file    Highest education level: Not on file   Occupational History    Not on file   Tobacco Use    Smoking status: Every Day     Current packs/day: 1.00     Average packs/day: 1 pack/day for 30.6 years (30.6  Yes

## 2024-09-24 NOTE — ED NOTES
Pt to ED via triage with c/o head laceration. Pt states he hit his head on his car door today. Small laceration to top of head noted. Bleeding controlled. Pt states area is throbbing. Denies LOC. Pt is a/ox4, ambulatory, RR even and non labored on room air, call light in reach.

## 2024-09-24 NOTE — PROCEDURES
PROCEDURE NOTE  Date: 9/24/2024   Name: Jacky Olivares  YOB: 1950    Lac Repair    Date/Time: 9/24/2024 1:15 PM    Performed by: Mine Osorio MD  Authorized by: Bri Elias DO    Consent:     Consent obtained:  Verbal    Consent given by:  Patient    Risks, benefits, and alternatives were discussed: yes      Risks discussed:  Infection and pain    Alternatives discussed:  No treatment  Universal protocol:     Procedure explained and questions answered to patient or proxy's satisfaction: yes      Patient identity confirmed:  Verbally with patient  Anesthesia:     Anesthesia method:  None  Laceration details:     Location:  Scalp    Scalp location:  L temporal    Length (cm):  2  Pre-procedure details:     Patient was prepped and draped in usual sterile fashion: Area irrigated with high pressure sterile saline.  Exploration:     Limited defect created (wound extended): no      Hemostasis achieved with:  Direct pressure    Imaging outcome: foreign body not noted      Wound exploration: entire depth of wound visualized      Wound extent: no areolar tissue violation noted, no fascia violation noted, no foreign bodies/material noted, no muscle damage noted, no nerve damage noted, no tendon damage noted, no underlying fracture noted and no vascular damage noted      Contaminated: no    Treatment:     Area cleansed with:  Saline    Amount of cleaning:  Standard    Irrigation solution:  Sterile saline    Irrigation method:  Pressure wash    Debridement:  None    Undermining:  None    Scar revision: no    Skin repair:     Repair method:  Staples    Number of staples:  3  Approximation:     Approximation:  Close  Repair type:     Repair type:  Simple  Post-procedure details:     Dressing:  Open (no dressing)    Procedure completion:  Tolerated      Mine Osorio MD  Emergency Medicine PGY-1

## 2024-09-24 NOTE — ED PROVIDER NOTES
[facility]     Emergency Department     Faculty Attestation  1:23 PM EDT      I performed a history and physical examination of the patient and discussed management with the resident. I have reviewed and agree with the resident’s findings including all diagnostic interpretations, and treatment plans as written. Any areas of disagreement are noted on the chart. I was personally present for the key portions of any procedures. I have documented in the chart those procedures where I was not present during the key portions. I have reviewed the emergency nurses triage note. I agree with the chief complaint, past medical history, past surgical history, allergies, medications, social and family history as documented unless otherwise noted below. Documentation of the HPI, Physical Exam and Medical Decision Making performed by scribregan is based on my personal performance of the HPI, PE and MDM. For Physician Assistant/ Nurse Practitioner cases/documentation I have personally evaluated this patient and have completed at least one if not all key elements of the E/M (history, physical exam, and MDM). Additional findings are as noted.    Primary Care Physician: Jenise Song, RADHA - CNP    Patient was bending over and picking up basekt and when he stood up he struck his top of head on tail gate. No LOC, felt dizzy for a few minutes, sustained laceration, patient has been doing well since then has been ambulatory no nausea or vomiting.  But still feels throbbing to the top of his scalp.  Came in for further evaluation    Patient is supposed to be on anticoagulation but has not been taking it for some time.  No neck pain no numbness, tingling or weakness    Patient with a 2 cm laceration to the mid line of his top parietal scalp.  It does gape, no surrounding ecchymosis but tender to palpation no midline cervical tenderness to palpation he has 5 out of 5 upper extremity and  lower extremity motor strength with sensation to light touch intact.  2+ radial pulses are equal bilaterally soft abdomen nondistended nontender to palpation patient moving all extremities equally pupils equal and reactive to light   EOMI bilaterally    Scalp laceration  Will need staples   Update tetanus    Bri Elias D.O, M.P.H  Attending Emergency Medicine Physician         Bri Elias, DO  09/24/24 [id number]

## 2024-09-24 NOTE — DISCHARGE INSTRUCTIONS
Three staples were put into your scalp to help clean your close your wound, these should come out in seven days' time. This can be performed by your primary care team, or our team if you prefer.    If you notice increased pain or redness around the incision, yellow or green discharge from the incision, or start having fevers or chills, please return to the emergency department for further evaluation of your wound.    Imaging Findings:  The CT of your head was negative for acute findings, including bleeding.  However, there were findings of microvascular ischemic changes versus a demyelinating condition.  This is something that should be further explored with your primary care team to evaluate for further workup needs.

## 2024-10-01 ENCOUNTER — HOSPITAL ENCOUNTER (EMERGENCY)
Age: 74
Discharge: HOME | End: 2024-10-01
Attending: EMERGENCY MEDICINE

## 2024-10-01 VITALS
RESPIRATION RATE: 15 BRPM | DIASTOLIC BLOOD PRESSURE: 102 MMHG | BODY MASS INDEX: 20.8 KG/M2 | HEART RATE: 91 BPM | SYSTOLIC BLOOD PRESSURE: 146 MMHG | OXYGEN SATURATION: 93 % | WEIGHT: 162.04 LBS | TEMPERATURE: 97.2 F

## 2024-10-01 DIAGNOSIS — Z48.02 ENCOUNTER FOR STAPLE REMOVAL: Primary | ICD-10-CM

## 2024-10-01 PROCEDURE — 99282 EMERGENCY DEPT VISIT SF MDM: CPT | Performed by: EMERGENCY MEDICINE

## 2024-10-01 ASSESSMENT — PAIN - FUNCTIONAL ASSESSMENT: PAIN_FUNCTIONAL_ASSESSMENT: NONE - DENIES PAIN

## 2024-10-01 NOTE — DISCHARGE INSTRUCTIONS
You were seen in the ER today for staple removal.  You may allow water to run over her head, gently wash your scalp.  Continue applying antibiotic ointment to the area.  Follow-up with your primary care doctor within the next few days for reevaluation.  Return to the ER for new or worsening symptoms or any other concerns.

## 2024-10-01 NOTE — ED PROVIDER NOTES
[facility]  Emergency Department  Faculty Attestation     I performed a history and physical examination of the patient and discussed management with the resident. I reviewed the resident’s note and agree with the documented findings and plan of care. Any areas of disagreement are noted on the chart. I was personally present for the key portions of any procedures. I have documented in the chart those procedures where I was not present during the key portions. I have reviewed the emergency nurses triage note. I agree with the chief complaint, past medical history, past surgical history, allergies, medications, social and family history as documented unless otherwise noted below.    For Physician Assistant/ Nurse Practitioner cases/documentation I have personally evaluated this patient and have completed at least one if not all key elements of the E/M (history, physical exam, and MDM). Additional findings are as noted.    Preliminary note started at 9:57 AM EDT    Primary Care Physician:  Jenise Song APRN - CNP    Screenings:  [unfilled]    CHIEF COMPLAINT       Chief Complaint   Patient presents with    Suture / Staple Removal     Top of head       RECENT VITALS:   BP (!) 146/102   Pulse 91   Temp 97.2 °F (36.2 °C) (Oral)   Resp 15   Wt 73.5 kg (162 lb 0.6 oz)   SpO2 93%   BMI 20.80 kg/m²     LABS:  Labs Reviewed - No data to display    Radiology  No orders to display         Attending Physician Additional  Notes    Patient had 3 staples placed 7 days ago.  He has no complaints.  No headache neck pain weakness bleeding.  On exam he is nontoxic slightly per tensive afebrile vital signs normal GCS is 15.  The wound is well-approximated without bruising swelling bleeding or evidence of infection.  Plan is staple removal routine follow-up.            Jose Miller MD, FACEP  Attending Emergency  Physician                Jose Miller MD  10/01/24 [id number]

## 2024-10-01 NOTE — ED PROVIDER NOTES
[facility] ED  Emergency Department Encounter  Emergency Medicine Resident     Pt Name:Jacky Olivares  MRN: [id number]  Birthdate 1950  Date of evaluation: 10/1/24  PCP:  Jenise Song APRN - CNP  Note Started: 9:48 AM EDT      CHIEF COMPLAINT       Chief Complaint   Patient presents with    Suture / Staple Removal     Top of head       HISTORY OF PRESENT ILLNESS  (Location/Symptom, Timing/Onset, Context/Setting, Quality, Duration, Modifying Factors, Severity.)      Jacky Olivares is a 73 y.o. male who presents for staple removal.  He had staples placed in his left scalp 1 week ago after accidentally hitting his head on his tailgate.  He has not had any issues since they were placed, no discharge or worsening pain.  No other symptoms or concerns.    PAST MEDICAL / SURGICAL / SOCIAL / FAMILY HISTORY      has a past medical history of Arthritis, CAD (coronary artery disease), Headache(784.0), Hyperlipidemia, Hypertension, Pneumonia, and Unspecified cerebral artery occlusion with cerebral infarction.     has a past surgical history that includes fracture surgery and Cardiac procedure (N/A, 5/17/2024).    Social History     Socioeconomic History    Marital status:      Spouse name: Not on file    Number of children: Not on file    Years of education: Not on file    Highest education level: Not on file   Occupational History    Not on file   Tobacco Use    Smoking status: Every Day     Current packs/day: 1.00     Average packs/day: 1 pack/day for 30.6 years (30.6 ttl pk-yrs)     Types: Cigarettes     Start date: 2/28/1994    Smokeless tobacco: Never   Substance and Sexual Activity    Alcohol use: Yes     Alcohol/week: 1.0 standard drink of alcohol     Types: 1 Cans of beer per week    Drug use: Yes     Types: Marijuana (Weed)     Comment: daily    Sexual activity: Yes     Partners: Female   Other Topics Concern    Not on file   Social History Narrative    Not on file     Social Determinants of

## 2024-10-01 NOTE — ED NOTES
Pt to ED needing staples removed. Pt has 3 staples on top of head. No redness or drainage noted. Site is clean and dry. Pt denies any pain. Pt alert and oriented x4, talking in complete sentences, respirations even and unlabored. Pt acting age appropriate. Will continue to plan of care.

## 2024-12-02 ENCOUNTER — OFFICE VISIT (OUTPATIENT)
Dept: PRIMARY CARE CLINIC | Age: 74
End: 2024-12-02
Payer: COMMERCIAL

## 2024-12-02 VITALS
SYSTOLIC BLOOD PRESSURE: 155 MMHG | HEIGHT: 74 IN | HEART RATE: 105 BPM | OXYGEN SATURATION: 91 % | WEIGHT: 167.6 LBS | DIASTOLIC BLOOD PRESSURE: 98 MMHG | BODY MASS INDEX: 21.51 KG/M2

## 2024-12-02 DIAGNOSIS — R25.2 MUSCLE CRAMPING: ICD-10-CM

## 2024-12-02 DIAGNOSIS — J41.0 SIMPLE CHRONIC BRONCHITIS (HCC): ICD-10-CM

## 2024-12-02 DIAGNOSIS — I26.93 SINGLE SUBSEGMENTAL PULMONARY EMBOLISM WITHOUT ACUTE COR PULMONALE (HCC): ICD-10-CM

## 2024-12-02 DIAGNOSIS — Z12.11 SCREENING FOR MALIGNANT NEOPLASM OF COLON: ICD-10-CM

## 2024-12-02 DIAGNOSIS — I25.10 CORONARY ARTERY DISEASE INVOLVING NATIVE HEART WITHOUT ANGINA PECTORIS, UNSPECIFIED VESSEL OR LESION TYPE: ICD-10-CM

## 2024-12-02 DIAGNOSIS — I10 PRIMARY HYPERTENSION: Primary | ICD-10-CM

## 2024-12-02 DIAGNOSIS — R41.3 MEMORY IMPAIRMENT: ICD-10-CM

## 2024-12-02 PROCEDURE — 99214 OFFICE O/P EST MOD 30 MIN: CPT | Performed by: NURSE PRACTITIONER

## 2024-12-02 PROCEDURE — 3077F SYST BP >= 140 MM HG: CPT | Performed by: NURSE PRACTITIONER

## 2024-12-02 PROCEDURE — 1123F ACP DISCUSS/DSCN MKR DOCD: CPT | Performed by: NURSE PRACTITIONER

## 2024-12-02 PROCEDURE — 1159F MED LIST DOCD IN RCRD: CPT | Performed by: NURSE PRACTITIONER

## 2024-12-02 PROCEDURE — 3080F DIAST BP >= 90 MM HG: CPT | Performed by: NURSE PRACTITIONER

## 2024-12-02 RX ORDER — ASPIRIN 81 MG/1
81 TABLET, CHEWABLE ORAL DAILY
Qty: 90 TABLET | Refills: 1 | Status: SHIPPED | OUTPATIENT
Start: 2024-12-02 | End: 2025-05-31

## 2024-12-02 RX ORDER — OMEPRAZOLE 40 MG/1
CAPSULE, DELAYED RELEASE ORAL
COMMUNITY

## 2024-12-02 RX ORDER — CARVEDILOL 3.12 MG/1
3.12 TABLET ORAL 2 TIMES DAILY WITH MEALS
Qty: 180 TABLET | Refills: 1 | Status: SHIPPED | OUTPATIENT
Start: 2024-12-02 | End: 2025-05-31

## 2024-12-02 RX ORDER — ALBUTEROL SULFATE 90 UG/1
2 INHALANT RESPIRATORY (INHALATION) 4 TIMES DAILY PRN
Qty: 1 EACH | Refills: 5 | Status: SHIPPED | OUTPATIENT
Start: 2024-12-02

## 2024-12-02 RX ORDER — ATORVASTATIN CALCIUM 40 MG/1
40 TABLET, FILM COATED ORAL NIGHTLY
Qty: 90 TABLET | Refills: 1 | Status: SHIPPED | OUTPATIENT
Start: 2024-12-02 | End: 2025-05-31

## 2024-12-02 RX ORDER — CLOPIDOGREL BISULFATE 75 MG/1
75 TABLET ORAL DAILY
Qty: 90 TABLET | Refills: 1 | Status: SHIPPED | OUTPATIENT
Start: 2024-12-02 | End: 2025-05-31

## 2024-12-02 RX ORDER — AMLODIPINE BESYLATE 5 MG/1
5 TABLET ORAL DAILY
Qty: 90 TABLET | Refills: 1 | Status: SHIPPED | OUTPATIENT
Start: 2024-12-02 | End: 2025-05-31

## 2024-12-02 RX ORDER — BUDESONIDE AND FORMOTEROL FUMARATE DIHYDRATE 160; 4.5 UG/1; UG/1
2 AEROSOL RESPIRATORY (INHALATION) 2 TIMES DAILY
Qty: 2 EACH | Refills: 5 | Status: SHIPPED | OUTPATIENT
Start: 2024-12-02 | End: 2025-05-31

## 2024-12-02 RX ORDER — TIOTROPIUM BROMIDE 18 UG/1
18 CAPSULE ORAL; RESPIRATORY (INHALATION) DAILY
Qty: 30 CAPSULE | Refills: 5 | Status: SHIPPED | OUTPATIENT
Start: 2024-12-02

## 2024-12-02 ASSESSMENT — ENCOUNTER SYMPTOMS
COLOR CHANGE: 0
VOMITING: 0
DIARRHEA: 0
CHEST TIGHTNESS: 0
SORE THROAT: 0
SINUS PRESSURE: 0
COUGH: 0
ABDOMINAL PAIN: 0
SHORTNESS OF BREATH: 0
SINUS PAIN: 0
BACK PAIN: 0
NAUSEA: 0
PHOTOPHOBIA: 0

## 2024-12-02 NOTE — PROGRESS NOTES
formulary and covered by insurance       The patient (or guardian, if applicable) and other individuals in attendance with the patient were advised that Artificial Intelligence will be utilized during this visit to record, process the conversation to generate a clinical note, and support improvement of the AI technology. The patient (or guardian, if applicable) and other individuals in attendance at the appointment consented to the use of AI, including the recording.      Reviewed health maintenance, prior labs and imaging.   Patient given educational materials - see patient instructions.    Discussed use, benefit, and side effects of prescribed medications. Barriers to medication compliance addressed.   All patient questions answered.  Pt voiced understanding to plan of care.   Instructed to continue medications as discussed, healthy diet and exercise.    Patient agreed with treatment plan. Follow up as directed below.     This note is created with the assistance of a speech-recognition program. While intending to generate a document that actually reflects the content of the visit, no guarantees can be provided that every mistake has been identified and corrected by editing.    Electronically signed by RADHA Kang CNP, APRN-CNP on 12/2/2024 at 1:09 PM

## 2024-12-15 ENCOUNTER — HOSPITAL ENCOUNTER (EMERGENCY)
Age: 74
Discharge: HOME | End: 2024-12-15
Attending: EMERGENCY MEDICINE
Payer: COMMERCIAL

## 2024-12-15 VITALS
OXYGEN SATURATION: 95 % | SYSTOLIC BLOOD PRESSURE: 131 MMHG | BODY MASS INDEX: 21.9 KG/M2 | WEIGHT: 170.6 LBS | DIASTOLIC BLOOD PRESSURE: 87 MMHG | TEMPERATURE: 98.1 F | RESPIRATION RATE: 18 BRPM | HEART RATE: 93 BPM

## 2024-12-15 DIAGNOSIS — N39.0 URINARY TRACT INFECTION IN MALE: Primary | ICD-10-CM

## 2024-12-15 LAB
ALBUMIN SERPL-MCNC: 4.2 G/DL (ref 3.5–5.2)
ALBUMIN/GLOB SERPL: 1.5 {RATIO} (ref 1–2.5)
ALP SERPL-CCNC: 54 U/L (ref 40–129)
ALT SERPL-CCNC: 16 U/L (ref 10–50)
ANION GAP SERPL CALCULATED.3IONS-SCNC: 9 MMOL/L (ref 9–16)
AST SERPL-CCNC: 26 U/L (ref 10–50)
BACTERIA URNS QL MICRO: NORMAL
BASOPHILS # BLD: <0.03 K/UL (ref 0–0.2)
BASOPHILS NFR BLD: 0 % (ref 0–2)
BILIRUB SERPL-MCNC: 0.3 MG/DL (ref 0–1.2)
BILIRUB UR QL STRIP: ABNORMAL
BUN SERPL-MCNC: 15 MG/DL (ref 8–23)
CALCIUM SERPL-MCNC: 9.9 MG/DL (ref 8.6–10.4)
CASTS #/AREA URNS LPF: NORMAL /LPF (ref 0–8)
CHLORIDE SERPL-SCNC: 103 MMOL/L (ref 98–107)
CLARITY UR: ABNORMAL
CO2 SERPL-SCNC: 29 MMOL/L (ref 20–31)
COLOR UR: ABNORMAL
CREAT SERPL-MCNC: 1.3 MG/DL (ref 0.7–1.2)
EOSINOPHIL # BLD: 0.04 K/UL (ref 0–0.44)
EOSINOPHILS RELATIVE PERCENT: 0 % (ref 1–4)
EPI CELLS #/AREA URNS HPF: NORMAL /HPF (ref 0–5)
ERYTHROCYTE [DISTWIDTH] IN BLOOD BY AUTOMATED COUNT: 17.4 % (ref 11.8–14.4)
GFR, ESTIMATED: 58 ML/MIN/1.73M2
GLUCOSE SERPL-MCNC: 146 MG/DL (ref 74–99)
GLUCOSE UR STRIP-MCNC: NEGATIVE MG/DL
HCT VFR BLD AUTO: 39.5 % (ref 40.7–50.3)
HGB BLD-MCNC: 12.5 G/DL (ref 13–17)
HGB UR QL STRIP.AUTO: ABNORMAL
IMM GRANULOCYTES # BLD AUTO: <0.03 K/UL (ref 0–0.3)
IMM GRANULOCYTES NFR BLD: 0 %
KETONES UR STRIP-MCNC: NEGATIVE MG/DL
LEUKOCYTE ESTERASE UR QL STRIP: ABNORMAL
LYMPHOCYTES NFR BLD: 2.16 K/UL (ref 1.1–3.7)
LYMPHOCYTES RELATIVE PERCENT: 22 % (ref 24–43)
MCH RBC QN AUTO: 22.9 PG (ref 25.2–33.5)
MCHC RBC AUTO-ENTMCNC: 31.6 G/DL (ref 28.4–34.8)
MCV RBC AUTO: 72.2 FL (ref 82.6–102.9)
MONOCYTES NFR BLD: 0.81 K/UL (ref 0.1–1.2)
MONOCYTES NFR BLD: 8 % (ref 3–12)
NEUTROPHILS NFR BLD: 70 % (ref 36–65)
NEUTS SEG NFR BLD: 6.95 K/UL (ref 1.5–8.1)
NITRITE UR QL STRIP: POSITIVE
NRBC BLD-RTO: 0 PER 100 WBC
PH UR STRIP: 5 [PH] (ref 5–8)
PLATELET # BLD AUTO: ABNORMAL K/UL (ref 138–453)
PLATELET, FLUORESCENCE: 210 K/UL (ref 138–453)
PLATELETS.RETICULATED NFR BLD AUTO: 6.8 % (ref 1.1–10.3)
POTASSIUM SERPL-SCNC: 4.4 MMOL/L (ref 3.7–5.3)
PROT SERPL-MCNC: 7 G/DL (ref 6.6–8.7)
PROT UR STRIP-MCNC: ABNORMAL MG/DL
RBC # BLD AUTO: 5.47 M/UL (ref 4.21–5.77)
RBC # BLD: ABNORMAL 10*6/UL
RBC #/AREA URNS HPF: NORMAL /HPF (ref 0–4)
SODIUM SERPL-SCNC: 141 MMOL/L (ref 136–145)
SP GR UR STRIP: 1.03 (ref 1–1.03)
UROBILINOGEN UR STRIP-ACNC: NORMAL EU/DL (ref 0–1)
WBC #/AREA URNS HPF: NORMAL /HPF (ref 0–5)
WBC OTHER # BLD: 10 K/UL (ref 3.5–11.3)

## 2024-12-15 PROCEDURE — 6360000002 HC RX W HCPCS

## 2024-12-15 PROCEDURE — 6370000000 HC RX 637 (ALT 250 FOR IP)

## 2024-12-15 PROCEDURE — 85055 RETICULATED PLATELET ASSAY: CPT

## 2024-12-15 PROCEDURE — 85025 COMPLETE CBC W/AUTO DIFF WBC: CPT

## 2024-12-15 PROCEDURE — 96374 THER/PROPH/DIAG INJ IV PUSH: CPT

## 2024-12-15 PROCEDURE — 99284 EMERGENCY DEPT VISIT MOD MDM: CPT

## 2024-12-15 PROCEDURE — 80053 COMPREHEN METABOLIC PANEL: CPT

## 2024-12-15 PROCEDURE — 81001 URINALYSIS AUTO W/SCOPE: CPT

## 2024-12-15 RX ORDER — CEPHALEXIN 500 MG/1
500 CAPSULE ORAL 2 TIMES DAILY
Qty: 14 CAPSULE | Refills: 0 | Status: SHIPPED | OUTPATIENT
Start: 2024-12-15 | End: 2024-12-22

## 2024-12-15 RX ORDER — KETOROLAC TROMETHAMINE 15 MG/ML
15 INJECTION, SOLUTION INTRAMUSCULAR; INTRAVENOUS ONCE
Status: COMPLETED | OUTPATIENT
Start: 2024-12-15 | End: 2024-12-15

## 2024-12-15 RX ORDER — CEPHALEXIN 500 MG/1
500 CAPSULE ORAL ONCE
Status: COMPLETED | OUTPATIENT
Start: 2024-12-15 | End: 2024-12-15

## 2024-12-15 RX ADMIN — CEPHALEXIN 500 MG: 500 CAPSULE ORAL at 10:03

## 2024-12-15 RX ADMIN — KETOROLAC TROMETHAMINE 15 MG: 15 INJECTION, SOLUTION INTRAMUSCULAR; INTRAVENOUS at 08:58

## 2024-12-15 ASSESSMENT — LIFESTYLE VARIABLES
HOW MANY STANDARD DRINKS CONTAINING ALCOHOL DO YOU HAVE ON A TYPICAL DAY: PATIENT DOES NOT DRINK
HOW OFTEN DO YOU HAVE A DRINK CONTAINING ALCOHOL: NEVER

## 2024-12-15 NOTE — ED PROVIDER NOTES
[facility]     Emergency Department     Faculty Attestation  8:06 AM EST      I performed a history and physical examination of the patient and discussed management with the resident. I have reviewed and agree with the resident’s findings including all diagnostic interpretations, and treatment plans as written. Any areas of disagreement are noted on the chart. I was personally present for the key portions of any procedures. I have documented in the chart those procedures where I was not present during the key portions. I have reviewed the emergency nurses triage note. I agree with the chief complaint, past medical history, past surgical history, allergies, medications, social and family history as documented unless otherwise noted below. Documentation of the HPI, Physical Exam and Medical Decision Making performed by scribregan is based on my personal performance of the HPI, PE and MDM. For Physician Assistant/ Nurse Practitioner cases/documentation I have personally evaluated this patient and have completed at least one if not all key elements of the E/M (history, physical exam, and MDM). Additional findings are as noted.    Primary Care Physician: Jenise Song, RADHA - CNP    Patient here with dark-colored urine.  And some right-sided flank pain.  No fevers.    Abdomen soft nondistended nontender palpation of all quadrants no rebound or guarding noted..    Patient did have a abdominal ultrasound in March 2024 that did not show an aneurysmal thoracic or abdominal aorta.  He is well-appearing on exam we will check urine analysis.    Bri Elias D.O, M.P.H  Attending Emergency Medicine Physician         Bri Elias, DO  12/15/24 0807

## 2024-12-15 NOTE — ED PROVIDER NOTES
[facility] EMERGENCY DEPARTMENT  Emergency Department Encounter  Emergency Medicine Resident     Pt Name:Jacky Olivares  MRN: [id number]  Birthdate 1950  Date of evaluation: 12/15/24  PCP:  Jenise Song APRN - CNP  Note Started: 10:14 AM EST      CHIEF COMPLAINT       Chief Complaint   Patient presents with    Dark Colored Urine        HISTORY OF PRESENT ILLNESS  (Location/Symptom, Timing/Onset, Context/Setting, Quality, Duration, Modifying Factors, Severity.)      Jacky Olivares is a 74 y.o. male who presents with complaints of reddish-brown urine which she describes as thick ongoing since Thursday of this past week.  Patient also has been having some discomfort in his right flank region.  He has not had any fevers or chills.  He has had mildly decreased appetite, but otherwise has been able to tolerate p.o. intake.  He has not had any chest pain or shortness of breath.  He has not had any abdominal pain, nausea or vomiting.  He does not have any dysuria, but he does have some urinary frequency.    PAST MEDICAL / SURGICAL / SOCIAL / FAMILY HISTORY      has a past medical history of Arthritis, CAD (coronary artery disease), Headache(784.0), Hyperlipidemia, Hypertension, Pneumonia, and Unspecified cerebral artery occlusion with cerebral infarction.     has a past surgical history that includes fracture surgery and Cardiac procedure (N/A, 5/17/2024).    Social History     Socioeconomic History    Marital status:      Spouse name: Not on file    Number of children: Not on file    Years of education: Not on file    Highest education level: Not on file   Occupational History    Not on file   Tobacco Use    Smoking status: Every Day     Current packs/day: 1.00     Average packs/day: 1 pack/day for 30.8 years (30.8 ttl pk-yrs)     Types: Cigarettes     Start date: 2/28/1994    Smokeless tobacco: Never   Substance and Sexual Activity    Alcohol use: Yes     Alcohol/week: 1.0 standard drink of alcohol

## 2024-12-15 NOTE — DISCHARGE INSTRUCTIONS
You were seen in the emergency department today for concern of blood in your urine as well as pain in your right side.  You were found to have a urinary tract infection.  This can cause some redness of your urine due to the irritation of your bladder lining.  We will need to treat you with an antibiotic.  Please take this as prescribed.  We discussed that you have an appointment with your primary doctor tomorrow morning.  Please go to this appointment as scheduled.  Please return to the emergency department if you are unable to urinate on your own, or if you develop any worsening symptoms such as severe pain, fever.

## 2024-12-16 ENCOUNTER — OFFICE VISIT (OUTPATIENT)
Dept: PRIMARY CARE CLINIC | Age: 74
End: 2024-12-16
Payer: COMMERCIAL

## 2024-12-16 VITALS
HEART RATE: 82 BPM | SYSTOLIC BLOOD PRESSURE: 143 MMHG | BODY MASS INDEX: 21.9 KG/M2 | WEIGHT: 170.64 LBS | HEIGHT: 74 IN | OXYGEN SATURATION: 93 % | DIASTOLIC BLOOD PRESSURE: 93 MMHG

## 2024-12-16 DIAGNOSIS — I10 PRIMARY HYPERTENSION: Primary | ICD-10-CM

## 2024-12-16 PROCEDURE — 1123F ACP DISCUSS/DSCN MKR DOCD: CPT | Performed by: NURSE PRACTITIONER

## 2024-12-16 PROCEDURE — 99213 OFFICE O/P EST LOW 20 MIN: CPT | Performed by: NURSE PRACTITIONER

## 2024-12-16 PROCEDURE — 3077F SYST BP >= 140 MM HG: CPT | Performed by: NURSE PRACTITIONER

## 2024-12-16 PROCEDURE — 1159F MED LIST DOCD IN RCRD: CPT | Performed by: NURSE PRACTITIONER

## 2024-12-16 PROCEDURE — 3080F DIAST BP >= 90 MM HG: CPT | Performed by: NURSE PRACTITIONER

## 2024-12-16 RX ORDER — AMLODIPINE BESYLATE 10 MG/1
10 TABLET ORAL DAILY
Qty: 90 TABLET | Refills: 1 | Status: SHIPPED | OUTPATIENT
Start: 2024-12-16 | End: 2025-06-14

## 2024-12-16 ASSESSMENT — ENCOUNTER SYMPTOMS
ABDOMINAL PAIN: 0
VOMITING: 0
CHEST TIGHTNESS: 0
SINUS PRESSURE: 0
PHOTOPHOBIA: 0
COUGH: 0
NAUSEA: 0
COLOR CHANGE: 0
BACK PAIN: 0
SHORTNESS OF BREATH: 0
DIARRHEA: 0
SINUS PAIN: 0
SORE THROAT: 0

## 2024-12-16 NOTE — PROGRESS NOTES
ear normal.      Left Ear: External ear normal.      Nose: Nose normal. No congestion or rhinorrhea.      Mouth/Throat:      Mouth: Mucous membranes are moist.      Pharynx: Oropharynx is clear.   Eyes:      Conjunctiva/sclera: Conjunctivae normal.      Pupils: Pupils are equal, round, and reactive to light.   Cardiovascular:      Rate and Rhythm: Normal rate and regular rhythm.      Pulses: Normal pulses.      Heart sounds: Normal heart sounds. No murmur heard.  Pulmonary:      Effort: Pulmonary effort is normal. No respiratory distress.      Breath sounds: Normal breath sounds. No wheezing.   Abdominal:      Palpations: Abdomen is soft.      Tenderness: There is no abdominal tenderness.   Musculoskeletal:         General: No swelling or signs of injury. Normal range of motion.      Cervical back: Normal range of motion.   Skin:     General: Skin is warm and dry.      Capillary Refill: Capillary refill takes less than 2 seconds.   Neurological:      General: No focal deficit present.      Mental Status: He is alert and oriented to person, place, and time. Mental status is at baseline.      Motor: No weakness.      Gait: Gait normal.   Psychiatric:         Behavior: Behavior normal.          Assessment/Plan:      1. Primary hypertension  -     amLODIPine (NORVASC) 10 MG tablet; Take 1 tablet by mouth daily, Disp-90 tablet, R-1Normal       Assessment & Plan  1. Hypertension.  His blood pressure readings have shown improvement, although they remain slightly elevated with a reading of 139/94 today. The dosage of amlodipine will be increased from 5 mg to 10 mg daily. He is advised to take two 5 mg tablets until the new prescription is filled. The updated prescription will be sent to [facility]. He should continue taking carvedilol twice a day.    2. Urinary tract infection (UTI).  He is advised to complete the full course of antibiotics over a period of 7 days, even if symptoms improve within a few days. If

## 2024-12-17 ENCOUNTER — APPOINTMENT (OUTPATIENT)
Dept: CT IMAGING | Age: 74
DRG: 690 | End: 2024-12-17
Payer: COMMERCIAL

## 2024-12-17 ENCOUNTER — HOSPITAL ENCOUNTER (EMERGENCY)
Age: 74
Discharge: HOME | DRG: 690 | End: 2024-12-17
Attending: HEALTH CARE PROVIDER
Payer: COMMERCIAL

## 2024-12-17 VITALS
TEMPERATURE: 98.3 F | DIASTOLIC BLOOD PRESSURE: 106 MMHG | OXYGEN SATURATION: 96 % | RESPIRATION RATE: 20 BRPM | HEART RATE: 87 BPM | SYSTOLIC BLOOD PRESSURE: 156 MMHG

## 2024-12-17 DIAGNOSIS — N10 ACUTE PYELONEPHRITIS: Primary | ICD-10-CM

## 2024-12-17 PROBLEM — R10.9 RIGHT FLANK PAIN: Status: ACTIVE | Noted: 2024-12-17

## 2024-12-17 PROBLEM — N39.0 URINARY TRACT INFECTION WITHOUT HEMATURIA: Status: ACTIVE | Noted: 2024-12-17

## 2024-12-17 LAB
ANION GAP SERPL CALCULATED.3IONS-SCNC: 8 MMOL/L (ref 9–16)
BASOPHILS # BLD: 0.04 K/UL (ref 0–0.2)
BASOPHILS NFR BLD: 0 % (ref 0–2)
BILIRUB UR QL STRIP: ABNORMAL
BUN SERPL-MCNC: 20 MG/DL (ref 8–23)
CALCIUM SERPL-MCNC: 9.6 MG/DL (ref 8.6–10.4)
CASTS #/AREA URNS LPF: NORMAL /LPF (ref 0–2)
CASTS #/AREA URNS LPF: NORMAL /LPF (ref 0–2)
CHLORIDE SERPL-SCNC: 105 MMOL/L (ref 98–107)
CLARITY UR: ABNORMAL
CO2 SERPL-SCNC: 29 MMOL/L (ref 20–31)
COLOR UR: ABNORMAL
CREAT SERPL-MCNC: 1.1 MG/DL (ref 0.7–1.2)
EOSINOPHIL # BLD: 0.05 K/UL (ref 0–0.44)
EOSINOPHILS RELATIVE PERCENT: 1 % (ref 1–4)
EPI CELLS #/AREA URNS HPF: NORMAL /HPF (ref 0–5)
ERYTHROCYTE [DISTWIDTH] IN BLOOD BY AUTOMATED COUNT: 19 % (ref 11.8–14.4)
GFR, ESTIMATED: 71 ML/MIN/1.73M2
GLUCOSE SERPL-MCNC: 87 MG/DL (ref 74–99)
GLUCOSE UR STRIP-MCNC: NEGATIVE MG/DL
HCT VFR BLD AUTO: 39.7 % (ref 40.7–50.3)
HGB BLD-MCNC: 12.5 G/DL (ref 13–17)
HGB UR QL STRIP.AUTO: ABNORMAL
IMM GRANULOCYTES # BLD AUTO: <0.03 K/UL (ref 0–0.3)
IMM GRANULOCYTES NFR BLD: 0 %
INR PPP: 1.1
KETONES UR STRIP-MCNC: NEGATIVE MG/DL
LEUKOCYTE ESTERASE UR QL STRIP: ABNORMAL
LYMPHOCYTES NFR BLD: 2.08 K/UL (ref 1.1–3.7)
LYMPHOCYTES RELATIVE PERCENT: 22 % (ref 24–43)
MCH RBC QN AUTO: 23.5 PG (ref 25.2–33.5)
MCHC RBC AUTO-ENTMCNC: 31.5 G/DL (ref 28.4–34.8)
MCV RBC AUTO: 74.5 FL (ref 82.6–102.9)
MONOCYTES NFR BLD: 0.78 K/UL (ref 0.1–1.2)
MONOCYTES NFR BLD: 8 % (ref 3–12)
NEUTROPHILS NFR BLD: 69 % (ref 36–65)
NEUTS SEG NFR BLD: 6.56 K/UL (ref 1.5–8.1)
NITRITE UR QL STRIP: POSITIVE
NRBC BLD-RTO: 0 PER 100 WBC
PARTIAL THROMBOPLASTIN TIME: 28.5 SEC (ref 23–36.5)
PH UR STRIP: 7.5 [PH] (ref 5–8)
PLATELET # BLD AUTO: ABNORMAL K/UL (ref 138–453)
PLATELET, FLUORESCENCE: 200 K/UL (ref 138–453)
PLATELETS.RETICULATED NFR BLD AUTO: 7.5 % (ref 1.1–10.3)
POTASSIUM SERPL-SCNC: 4.5 MMOL/L (ref 3.7–5.3)
PROT UR STRIP-MCNC: ABNORMAL MG/DL
PROTHROMBIN TIME: 14.2 SEC (ref 11.7–14.9)
RBC # BLD AUTO: 5.33 M/UL (ref 4.21–5.77)
RBC # BLD: ABNORMAL 10*6/UL
RBC #/AREA URNS HPF: NORMAL /HPF (ref 0–2)
SODIUM SERPL-SCNC: 142 MMOL/L (ref 136–145)
SP GR UR STRIP: 1.02 (ref 1–1.03)
UROBILINOGEN UR STRIP-ACNC: NORMAL EU/DL (ref 0–1)
WBC #/AREA URNS HPF: NORMAL /HPF (ref 0–5)
WBC OTHER # BLD: 9.5 K/UL (ref 3.5–11.3)

## 2024-12-17 PROCEDURE — 74178 CT ABD&PLV WO CNTR FLWD CNTR: CPT

## 2024-12-17 PROCEDURE — 85025 COMPLETE CBC W/AUTO DIFF WBC: CPT

## 2024-12-17 PROCEDURE — 6360000002 HC RX W HCPCS: Performed by: STUDENT IN AN ORGANIZED HEALTH CARE EDUCATION/TRAINING PROGRAM

## 2024-12-17 PROCEDURE — 85730 THROMBOPLASTIN TIME PARTIAL: CPT

## 2024-12-17 PROCEDURE — 85055 RETICULATED PLATELET ASSAY: CPT

## 2024-12-17 PROCEDURE — 80048 BASIC METABOLIC PNL TOTAL CA: CPT

## 2024-12-17 PROCEDURE — 87086 URINE CULTURE/COLONY COUNT: CPT

## 2024-12-17 PROCEDURE — 51798 US URINE CAPACITY MEASURE: CPT

## 2024-12-17 PROCEDURE — 81001 URINALYSIS AUTO W/SCOPE: CPT

## 2024-12-17 PROCEDURE — 96372 THER/PROPH/DIAG INJ SC/IM: CPT

## 2024-12-17 PROCEDURE — 99285 EMERGENCY DEPT VISIT HI MDM: CPT

## 2024-12-17 PROCEDURE — 85610 PROTHROMBIN TIME: CPT

## 2024-12-17 PROCEDURE — 6360000004 HC RX CONTRAST MEDICATION

## 2024-12-17 PROCEDURE — 6370000000 HC RX 637 (ALT 250 FOR IP)

## 2024-12-17 PROCEDURE — 74176 CT ABD & PELVIS W/O CONTRAST: CPT

## 2024-12-17 RX ORDER — CIPROFLOXACIN 500 MG/1
500 TABLET, FILM COATED ORAL 2 TIMES DAILY
Qty: 14 TABLET | Refills: 0 | Status: SHIPPED | OUTPATIENT
Start: 2024-12-17 | End: 2024-12-24

## 2024-12-17 RX ORDER — CIPROFLOXACIN 500 MG/1
500 TABLET, FILM COATED ORAL 2 TIMES DAILY
Qty: 14 TABLET | Refills: 0 | Status: SHIPPED | OUTPATIENT
Start: 2024-12-17 | End: 2024-12-17

## 2024-12-17 RX ORDER — KETOROLAC TROMETHAMINE 15 MG/ML
15 INJECTION, SOLUTION INTRAMUSCULAR; INTRAVENOUS ONCE
Status: COMPLETED | OUTPATIENT
Start: 2024-12-17 | End: 2024-12-17

## 2024-12-17 RX ORDER — CIPROFLOXACIN 500 MG/1
500 TABLET, FILM COATED ORAL ONCE
Status: COMPLETED | OUTPATIENT
Start: 2024-12-17 | End: 2024-12-17

## 2024-12-17 RX ORDER — IOPAMIDOL 755 MG/ML
120 INJECTION, SOLUTION INTRAVASCULAR
Status: COMPLETED | OUTPATIENT
Start: 2024-12-17 | End: 2024-12-17

## 2024-12-17 RX ORDER — CEPHALEXIN 500 MG/1
500 CAPSULE ORAL EVERY 8 HOURS SCHEDULED
Status: DISCONTINUED | OUTPATIENT
Start: 2024-12-17 | End: 2024-12-17

## 2024-12-17 RX ADMIN — KETOROLAC TROMETHAMINE 15 MG: 15 INJECTION, SOLUTION INTRAMUSCULAR; INTRAVENOUS at 16:14

## 2024-12-17 RX ADMIN — CIPROFLOXACIN HYDROCHLORIDE 500 MG: 500 TABLET, FILM COATED ORAL at 20:04

## 2024-12-17 RX ADMIN — IOPAMIDOL 120 ML: 755 INJECTION, SOLUTION INTRAVENOUS at 19:37

## 2024-12-17 ASSESSMENT — ENCOUNTER SYMPTOMS
ALLERGIC/IMMUNOLOGIC NEGATIVE: 1
GASTROINTESTINAL NEGATIVE: 1
RESPIRATORY NEGATIVE: 1
EYES NEGATIVE: 1
VOMITING: 0
NAUSEA: 0
DIARRHEA: 0

## 2024-12-17 ASSESSMENT — PAIN - FUNCTIONAL ASSESSMENT: PAIN_FUNCTIONAL_ASSESSMENT: 0-10

## 2024-12-17 ASSESSMENT — PAIN SCALES - GENERAL: PAINLEVEL_OUTOF10: 9

## 2024-12-17 NOTE — ED PROVIDER NOTES
[facility] EMERGENCY DEPARTMENT     Emergency Department     Faculty Attestation        I performed a history and physical examination of the patient and discussed management with the resident. I reviewed the resident’s note and agree with the documented findings and plan of care. Any areas of disagreement are noted on the chart. I was personally present for the key portions of any procedures. I have documented in the chart those procedures where I was not present during the key portions. I have reviewed the emergency nurses triage note. I agree with the chief complaint, past medical history, past surgical history, allergies, medications, social and family history as documented unless otherwise noted below.    For mid-level providers such as nurse practitioners as well as physicians assistants:    I have personally seen and evaluated the patient.    I find the patient's history and physical exam are consistent with NP/PA documentation.  I agree with the care provided, treatment rendered, disposition, & follow-up plan.     Additional findings are as noted.    Vital Signs: BP (!) 156/106   Pulse 87   Temp 98.3 °F (36.8 °C) (Oral)   Resp 20   SpO2 96%   PCP:  Jenise Song APRN - CNP    Pertinent Comments:     This 74-year-old male presents with worsening flank pain.    Patient was seen in this department on 12/15/2024 with concerns for some reddish-brown urine.  Was diagnosed with the urinary tract infection and discharged on a course of Keflex.  Return to this time with worsening flank pain.  Patient states that pain in his flank was low-grade for the last couple of days and so got certainly worse earlier today.    Patient is unsure if he has had a prior history of kidney stone in the past but does mention that he had a history of \"some kind of stone \"in the past.    On physical exam, patient is in no acute distress but does have some mild discomfort when trying

## 2024-12-17 NOTE — ED NOTES
Patient presents to the ED for right sided flank pain and \"dark colored urine\"  Reports he was seen here Sunday and diagnosed with UTI  States he was given abx and has been taking them but the pain is worse   Seen family doctor yesterday for blood pressure checkup but said he didn't mention the pain to her since it wasn't as bad as today  Patient a/o x 4 with NAD noted at this time  Pt resting on cot with call light in reach

## 2024-12-17 NOTE — CONSULTS
[facility]  Dannie Lui MD FACS    Urology Consultation    Patient:  Jacky Olivares  MRN: [id number]  YOB: 1950    REASON FOR CONSULT:  right flank pain     HISTORY OF PRESENT ILLNESS:   The patient is a 74 y.o. male who presents with consistent right flank pain that started about 1 week ago. He was seen in ED 12/15 and diagnosed with UTI. At that time he stated his flank pain was not severe. He has been having red colored urine for the last 3 days as well. Patient denies any other symptoms besides feeling tired. He states he has been voiding decent amounts. Prior to these symptoms, he did notice some nocturia, weak stream, but nothing bothersome enough to see a provider. He is not on Flomax. He does have significant smoking history.   Urology consulted due to CT finding of right hydronephrosis and \"amorphous hyperdensity within an interpolar calyx on the right. Unclear whether this is debris, hemorrhage or upper tract urothelial neoplasm.\"    Patient's old records, notes and chart reviewed and summarized above.    Past Medical History:    Past Medical History:   Diagnosis Date    Arthritis     CAD (coronary artery disease)     Headache(784.0)     Hyperlipidemia     Hypertension     Pneumonia     Unspecified cerebral artery occlusion with cerebral infarction        Past Surgical History:    Previous  surgery: none   Past Surgical History:   Procedure Laterality Date    CARDIAC PROCEDURE N/A 5/17/2024    Left heart cath / coronary angiography performed by Myke Weathers MD at [facility] CARDIAC CATH LAB    FRACTURE SURGERY         Medications:    Scheduled Meds:   cephALEXin  500 mg Oral 3 times per day     Continuous Infusions:  PRN Meds:.    Allergies:    Patient has no known allergies.    Social History:    Smoking: daily for 30 years  Social History     Socioeconomic History    Marital status:      Spouse name: Not on file    Number of children: Not on file    Years

## 2024-12-17 NOTE — ED PROVIDER NOTES
[facility] EMERGENCY DEPARTMENT  Emergency Department Encounter  Emergency Medicine Resident     Pt Name:Jacky Olivares  MRN: [id number]  Birthdate 1950  Date of evaluation: 12/17/24  PCP:  Jenise Song APRN - CNP  Note Started: 2:36 PM EST      CHIEF COMPLAINT       Chief Complaint   Patient presents with    Urinary Tract Infection    Flank Pain     Right side       HISTORY OF PRESENT ILLNESS  (Location/Symptom, Timing/Onset, Context/Setting, Quality, Duration, Modifying Factors, Severity.)      Jacky Olivares is a 74 y.o. male who presents with right flank and back pain. Patient was seen two days ago for discolored urine and was discharged with Keflex for UTI. Patient followed up with his NP yesterday and was advised to finish abx course to completion. If then no improvement in symptoms patient was to return to the outpatient office. He reports the pharmacy advised him to take the keflex twice daily instead of three times daily. Today patient noticed increased and worsening flank pain. He is unable to lie down without severe pain. He denies consitutional symptoms. He is able to drink and eat without difficulty.     PAST MEDICAL / SURGICAL / SOCIAL / FAMILY HISTORY      has a past medical history of Arthritis, CAD (coronary artery disease), Headache(784.0), Hyperlipidemia, Hypertension, Pneumonia, and Unspecified cerebral artery occlusion with cerebral infarction.     has a past surgical history that includes fracture surgery and Cardiac procedure (N/A, 5/17/2024).    Social History     Socioeconomic History    Marital status:      Spouse name: Not on file    Number of children: Not on file    Years of education: Not on file    Highest education level: Not on file   Occupational History    Not on file   Tobacco Use    Smoking status: Every Day     Current packs/day: 1.00     Average packs/day: 1 pack/day for 30.8 years (30.8 ttl pk-yrs)     Types: Cigarettes     Start date: 2/28/1994     daily 2/28/24   Jenise Song APRN - CNP   acetaminophen (APAP EXTRA STRENGTH) 500 MG tablet Take 2 tablets by mouth every 6 hours as needed for Pain 1/31/19   Ana Chavez DO       REVIEW OF SYSTEMS       Review of Systems   Constitutional:  Positive for activity change. Negative for appetite change and chills.   HENT: Negative.     Eyes: Negative.    Respiratory: Negative.     Cardiovascular: Negative.    Gastrointestinal: Negative.  Negative for diarrhea, nausea and vomiting.   Endocrine: Negative.    Genitourinary:  Positive for flank pain.        TTP along right flank. Does not radiate.    Skin: Negative.    Allergic/Immunologic: Negative.    Neurological: Negative.    Hematological: Negative.    Psychiatric/Behavioral: Negative.         PHYSICAL EXAM      INITIAL VITALS:   BP (!) 156/106   Pulse 87   Temp 98.3 °F (36.8 °C) (Oral)   Resp 20   SpO2 96%     Physical Exam  Constitutional:       Appearance: Normal appearance.   HENT:      Head: Normocephalic.   Cardiovascular:      Rate and Rhythm: Normal rate.   Pulmonary:      Effort: Pulmonary effort is normal.   Abdominal:      Tenderness: There is no abdominal tenderness.   Musculoskeletal:         General: Normal range of motion.      Comments: Pain to right flank area, worsens with palpation.    Skin:     General: Skin is warm.   Neurological:      General: No focal deficit present.      Mental Status: He is alert.   Psychiatric:         Mood and Affect: Mood normal.         Behavior: Behavior normal.           DDX/DIAGNOSTIC RESULTS / EMERGENCY DEPARTMENT COURSE / MDM     Medical Decision Making  Amount and/or Complexity of Data Reviewed  Labs: ordered. Decision-making details documented in ED Course.  Radiology: ordered. Decision-making details documented in ED Course.    Risk  Prescription drug management.        EMERGENCY DEPARTMENT COURSE:  Toradol 15 mg IM injection given for pain control. CBC and BMP as well as urinalysis with reflex performed

## 2024-12-18 LAB
MICROORGANISM SPEC CULT: NO GROWTH
SERVICE CMNT-IMP: NORMAL
SPECIMEN DESCRIPTION: NORMAL

## 2024-12-18 NOTE — ED PROVIDER NOTES
[facility] EMERGENCY DEPARTMENT  Emergency Department  Faculty Sign-Out Addendum     Care of Jacky Olivares was assumed from previous attending and is being seen for Urinary Tract Infection and Flank Pain (Right side)  .  The patient's initial evaluation and plan have been discussed with the prior provider who initially evaluated the patient.    Handoff taken on the following patient from prior Attending Physician:    Attestation    I was available and discussed any additional care issues that arose and coordinated the management plans with the resident(s) caring for the patient during my duty period. Any areas of disagreement with resident’s documentation of care or procedures are noted on the chart. I was personally present for the key portions of any/all procedures during my duty period. I have documented in the chart those procedures where I was not present during the key portions.      EMERGENCY DEPARTMENT COURSE / MEDICAL DECISION MAKING:       MEDICATIONS GIVEN:  Orders Placed This Encounter   Medications    ketorolac (TORADOL) injection 15 mg    cephALEXin (KEFLEX) capsule 500 mg     Order Specific Question:   Antimicrobial Indications     Answer:   Urinary Tract Infection     Order Specific Question:   UTI duration of therapy     Answer:   5 days       LABS / RADIOLOGY:     Labs Reviewed   BASIC METABOLIC PANEL - Abnormal; Notable for the following components:       Result Value    Anion Gap 8 (*)     All other components within normal limits   CBC WITH AUTO DIFFERENTIAL - Abnormal; Notable for the following components:    Hemoglobin 12.5 (*)     Hematocrit 39.7 (*)     MCV 74.5 (*)     MCH 23.5 (*)     RDW 19.0 (*)     Neutrophils % 69 (*)     Lymphocytes % 22 (*)     All other components within normal limits   URINALYSIS WITH REFLEX TO CULTURE - Abnormal; Notable for the following components:    Color, UA Red (*)     Turbidity UA 2+ (*)     Bilirubin, Urine NEGATIVE  Verified by ictotest. (*)   abnormality.     Exam is degraded by streak artifact from left hip hardware. 1. Mild right hydroureteronephrosis.  The course of the distal ureter is not well seen.  No convincing obstructive stone. 2. Amorphous hyperdensity within an interpolar calyx on the right.  Unclear whether this is debris, hemorrhage or upper tract urothelial neoplasm. Recommend short interval follow-up CT urography.       RECENT VITALS:     Temp: 98.3 °F (36.8 °C),  Pulse: 87, Respirations: 20, BP: (!) 156/106, SpO2: 96 %    This patient is a 74 y.o. Male with flank pain, dysuria.  Urine suggesting nephrolithiasis.  CT showing some hydroureter there is also atypical hyperdensity found of unclear etiology.  Urology involved    OUTSTANDING TASKS / RECOMMENDATIONS:    Awaiting urology recommendations      Dylan Mercer MD, FACEP, FAAEM  Attending Emergency Physician  [facility] EMERGENCY DEPARTMENT        Dylan Mercer MD  12/17/24 4488

## 2024-12-18 NOTE — DISCHARGE INSTRUCTIONS
You were seen in the emergency department for concerns for UTI.  You do still have a UTI, the cystogram was largely unrevealing however there is some debris within your ureter [the tract that connects the kidney to bladder].  You must follow-up with the urologist outpatient for an ultrasound scan.  This may cause recurrent infections.  To treat this current infection, you are being placed on ciprofloxacin.    Stop taking the Keflex that you were previously prescribed and start taking the ciprofloxacin.  The dose is 500 mg twice daily until the course is complete [7 days].    Stop taking zanaflex [muscle relaxant] as this interacts with ciprofloxacin.     You must follow-up with the urologist at the number below as soon as possible.    Return to the emergency room if you develop worsening fever, worsening pain, or if your symptoms are not improved within the next few days.

## 2024-12-18 NOTE — ED NOTES
The following labs were labeled with appropriate pt sticker and tubed to lab:     [x] Blue     [] Lavender   [] on ice  [] Green/yellow  [] Green/black [] on ice  [] Grey  [] on ice  [] Yellow  [] Red  [] Pink  [] Type/ Screen  [] ABG  [] VBG    [] COVID-19 swab    [] Rapid  [] PCR  [] Flu swab  [] Peds Viral Panel     [] Urine Sample  [] Fecal Sample  [] Pelvic Cultures  [] Blood Cultures  [] X 2  [] STREP Cultures  [] Wound Cultures

## 2024-12-18 NOTE — ED PROVIDER NOTES
streak artifact from left hip hardware. 1. Mild right hydroureteronephrosis.  The course of the distal ureter is not well seen.  No convincing obstructive stone. 2. Amorphous hyperdensity within an interpolar calyx on the right.  Unclear whether this is debris, hemorrhage or upper tract urothelial neoplasm. Recommend short interval follow-up CT urography.       RECENT VITALS:     Temp: 98.3 °F (36.8 °C),  Pulse: 87, Respirations: 20, BP: (!) 156/106, SpO2: 96 %      This patient is a 74 y.o. Male with right-sided flank pain, has a known UTI.  Is on antibiotics at home, Keflex.  Is meant to have a CT urogram per urology.  CT abdomen pelvis did show debris versus other nondescribed obstruction in the right ureter not visualized, recommending CT urogram.  Per urology, will place patient on Cipro as opposed to Keflex.  I did discuss this with the patient, did discuss that he is to stop taking the Keflex and to start the ciprofloxacin.  I did provide return precautions, I did provide instructions to not increase activity to decrease the risk of Achilles tendinitis.  I did inform him to return to the emergency room if he has worsening Achilles tendon pain.  I did inform him to return if he has worsening urinary frequency, dysuria, fever, flank pain, or any other acute medical concern.  I did also inform him that he needs to stop taking the Zanaflex while on the ciprofloxacin as there is a drug interaction.  He verbalized understanding of all of this as did his daughter at bedside.      ED Course as of 12/18/24 0147   Tue Dec 17, 2024   1513 Urinalysis with Reflex to Culture [BILL]   1513 Basic Metabolic Panel [BILL]   1513 CBC with Auto Differential [BILL]   1625 Culture, Urine [BILL]   1625 CBC with Auto Differential(!):    WBC 9.5   RBC 5.33   Hemoglobin Quant 12.5(!)   Hematocrit 39.7(!)   MCV 74.5(!)   MCH 23.5(!)   MCHC 31.5   RDW 19.0(!)   Platelet Count See Reflexed IPF Result   Platelet, Fluorescence 200   Platelet,  Acute pyelonephritis        DISPOSITION:         DISPOSITION:  [x]  Discharge   []  Transfer -    []  Admission -     []  Against Medical Advice   []  Eloped   FOLLOW-UP: Dannie Lui MD  [facility] [id number] [phone]    Schedule an appointment as soon as possible for a visit in 3 week(s)  Follow up in urology clinic for blood in the urine    [facility] Emergency Department  [address] [place] [address]  [phone]  Go to   If symptoms worsen     DISCHARGE MEDICATIONS: Discharge Medication List as of 12/17/2024  9:47 PM             Hang Barahona MD  Emergency Medicine Resident  [facility]

## 2024-12-19 ENCOUNTER — APPOINTMENT (OUTPATIENT)
Dept: ULTRASOUND IMAGING | Age: 74
DRG: 690 | End: 2024-12-19
Payer: COMMERCIAL

## 2024-12-19 ENCOUNTER — HOSPITAL ENCOUNTER (INPATIENT)
Age: 74
LOS: 1 days | Discharge: LEFT BEFORE DISCHARGE | DRG: 690 | End: 2024-12-20
Attending: HEALTH CARE PROVIDER | Admitting: INTERNAL MEDICINE
Payer: COMMERCIAL

## 2024-12-19 DIAGNOSIS — N12 PYELONEPHRITIS: ICD-10-CM

## 2024-12-19 DIAGNOSIS — R31.9 HEMATURIA, UNSPECIFIED TYPE: Primary | ICD-10-CM

## 2024-12-19 LAB
ANION GAP SERPL CALCULATED.3IONS-SCNC: 10 MMOL/L (ref 9–16)
BASOPHILS # BLD: 0.05 K/UL (ref 0–0.2)
BASOPHILS NFR BLD: 0 % (ref 0–2)
BUN SERPL-MCNC: 18 MG/DL (ref 8–23)
CALCIUM SERPL-MCNC: 9 MG/DL (ref 8.6–10.4)
CHLORIDE SERPL-SCNC: 104 MMOL/L (ref 98–107)
CO2 SERPL-SCNC: 27 MMOL/L (ref 20–31)
CREAT SERPL-MCNC: 1.2 MG/DL (ref 0.7–1.2)
EOSINOPHIL # BLD: 0.09 K/UL (ref 0–0.44)
EOSINOPHILS RELATIVE PERCENT: 1 % (ref 1–4)
ERYTHROCYTE [DISTWIDTH] IN BLOOD BY AUTOMATED COUNT: 18.2 % (ref 11.8–14.4)
GFR, ESTIMATED: 64 ML/MIN/1.73M2
GLUCOSE SERPL-MCNC: 99 MG/DL (ref 74–99)
HCT VFR BLD AUTO: 38.6 % (ref 40.7–50.3)
HGB BLD-MCNC: 12.5 G/DL (ref 13–17)
IMM GRANULOCYTES # BLD AUTO: 0.03 K/UL (ref 0–0.3)
IMM GRANULOCYTES NFR BLD: 0 %
LYMPHOCYTES NFR BLD: 2.55 K/UL (ref 1.1–3.7)
LYMPHOCYTES RELATIVE PERCENT: 23 % (ref 24–43)
MCH RBC QN AUTO: 23.4 PG (ref 25.2–33.5)
MCHC RBC AUTO-ENTMCNC: 32.4 G/DL (ref 28.4–34.8)
MCV RBC AUTO: 72.3 FL (ref 82.6–102.9)
MONOCYTES NFR BLD: 1.03 K/UL (ref 0.1–1.2)
MONOCYTES NFR BLD: 9 % (ref 3–12)
NEUTROPHILS NFR BLD: 66 % (ref 36–65)
NEUTS SEG NFR BLD: 7.37 K/UL (ref 1.5–8.1)
NRBC BLD-RTO: 0 PER 100 WBC
PLATELET # BLD AUTO: ABNORMAL K/UL (ref 138–453)
PLATELET, FLUORESCENCE: 212 K/UL (ref 138–453)
PLATELETS.RETICULATED NFR BLD AUTO: 7.3 % (ref 1.1–10.3)
POTASSIUM SERPL-SCNC: 4.2 MMOL/L (ref 3.7–5.3)
RBC # BLD AUTO: 5.34 M/UL (ref 4.21–5.77)
RBC # BLD: ABNORMAL 10*6/UL
SODIUM SERPL-SCNC: 141 MMOL/L (ref 136–145)
WBC OTHER # BLD: 11.1 K/UL (ref 3.5–11.3)

## 2024-12-19 PROCEDURE — 6370000000 HC RX 637 (ALT 250 FOR IP)

## 2024-12-19 PROCEDURE — 93005 ELECTROCARDIOGRAM TRACING: CPT | Performed by: STUDENT IN AN ORGANIZED HEALTH CARE EDUCATION/TRAINING PROGRAM

## 2024-12-19 PROCEDURE — 2580000003 HC RX 258

## 2024-12-19 PROCEDURE — 6360000002 HC RX W HCPCS: Performed by: EMERGENCY MEDICINE

## 2024-12-19 PROCEDURE — 2500000003 HC RX 250 WO HCPCS

## 2024-12-19 PROCEDURE — 99285 EMERGENCY DEPT VISIT HI MDM: CPT

## 2024-12-19 PROCEDURE — 6360000002 HC RX W HCPCS: Performed by: HEALTH CARE PROVIDER

## 2024-12-19 PROCEDURE — 1200000000 HC SEMI PRIVATE

## 2024-12-19 PROCEDURE — 80048 BASIC METABOLIC PNL TOTAL CA: CPT

## 2024-12-19 PROCEDURE — 2580000003 HC RX 258: Performed by: EMERGENCY MEDICINE

## 2024-12-19 PROCEDURE — 96365 THER/PROPH/DIAG IV INF INIT: CPT

## 2024-12-19 PROCEDURE — 6360000002 HC RX W HCPCS

## 2024-12-19 PROCEDURE — 76770 US EXAM ABDO BACK WALL COMP: CPT

## 2024-12-19 PROCEDURE — 96375 TX/PRO/DX INJ NEW DRUG ADDON: CPT

## 2024-12-19 PROCEDURE — 99222 1ST HOSP IP/OBS MODERATE 55: CPT | Performed by: INTERNAL MEDICINE

## 2024-12-19 PROCEDURE — 85055 RETICULATED PLATELET ASSAY: CPT

## 2024-12-19 PROCEDURE — 85025 COMPLETE CBC W/AUTO DIFF WBC: CPT

## 2024-12-19 RX ORDER — SODIUM CHLORIDE 0.9 % (FLUSH) 0.9 %
5-40 SYRINGE (ML) INJECTION PRN
Status: DISCONTINUED | OUTPATIENT
Start: 2024-12-19 | End: 2024-12-20 | Stop reason: HOSPADM

## 2024-12-19 RX ORDER — SODIUM CHLORIDE 9 MG/ML
INJECTION, SOLUTION INTRAVENOUS PRN
Status: DISCONTINUED | OUTPATIENT
Start: 2024-12-19 | End: 2024-12-20 | Stop reason: HOSPADM

## 2024-12-19 RX ORDER — ALBUTEROL SULFATE 0.83 MG/ML
2.5 SOLUTION RESPIRATORY (INHALATION) EVERY 6 HOURS PRN
Status: DISCONTINUED | OUTPATIENT
Start: 2024-12-19 | End: 2024-12-20 | Stop reason: HOSPADM

## 2024-12-19 RX ORDER — BUDESONIDE AND FORMOTEROL FUMARATE DIHYDRATE 160; 4.5 UG/1; UG/1
2 AEROSOL RESPIRATORY (INHALATION) 2 TIMES DAILY
Status: DISCONTINUED | OUTPATIENT
Start: 2024-12-19 | End: 2024-12-20 | Stop reason: HOSPADM

## 2024-12-19 RX ORDER — CARVEDILOL 3.12 MG/1
3.12 TABLET ORAL 2 TIMES DAILY WITH MEALS
Status: DISCONTINUED | OUTPATIENT
Start: 2024-12-19 | End: 2024-12-20 | Stop reason: HOSPADM

## 2024-12-19 RX ORDER — POTASSIUM CHLORIDE 7.45 MG/ML
10 INJECTION INTRAVENOUS PRN
Status: DISCONTINUED | OUTPATIENT
Start: 2024-12-19 | End: 2024-12-20 | Stop reason: HOSPADM

## 2024-12-19 RX ORDER — ONDANSETRON 4 MG/1
4 TABLET, ORALLY DISINTEGRATING ORAL EVERY 8 HOURS PRN
Status: DISCONTINUED | OUTPATIENT
Start: 2024-12-19 | End: 2024-12-20 | Stop reason: HOSPADM

## 2024-12-19 RX ORDER — POLYETHYLENE GLYCOL 3350 17 G/17G
17 POWDER, FOR SOLUTION ORAL DAILY PRN
Status: DISCONTINUED | OUTPATIENT
Start: 2024-12-19 | End: 2024-12-20 | Stop reason: HOSPADM

## 2024-12-19 RX ORDER — AMLODIPINE BESYLATE 10 MG/1
10 TABLET ORAL DAILY
Status: DISCONTINUED | OUTPATIENT
Start: 2024-12-19 | End: 2024-12-20 | Stop reason: HOSPADM

## 2024-12-19 RX ORDER — CETIRIZINE HYDROCHLORIDE 10 MG/1
10 TABLET ORAL DAILY
Status: DISCONTINUED | OUTPATIENT
Start: 2024-12-19 | End: 2024-12-20 | Stop reason: HOSPADM

## 2024-12-19 RX ORDER — ALBUTEROL SULFATE 90 UG/1
2 INHALANT RESPIRATORY (INHALATION) 4 TIMES DAILY PRN
Status: DISCONTINUED | OUTPATIENT
Start: 2024-12-19 | End: 2024-12-20 | Stop reason: HOSPADM

## 2024-12-19 RX ORDER — CLOPIDOGREL BISULFATE 75 MG/1
75 TABLET ORAL DAILY
Status: DISCONTINUED | OUTPATIENT
Start: 2024-12-19 | End: 2024-12-20 | Stop reason: HOSPADM

## 2024-12-19 RX ORDER — POTASSIUM CHLORIDE 1500 MG/1
40 TABLET, EXTENDED RELEASE ORAL PRN
Status: DISCONTINUED | OUTPATIENT
Start: 2024-12-19 | End: 2024-12-20 | Stop reason: HOSPADM

## 2024-12-19 RX ORDER — ASPIRIN 81 MG/1
81 TABLET, CHEWABLE ORAL DAILY
Status: DISCONTINUED | OUTPATIENT
Start: 2024-12-19 | End: 2024-12-20 | Stop reason: HOSPADM

## 2024-12-19 RX ORDER — ACETAMINOPHEN 500 MG
1000 TABLET ORAL
Status: COMPLETED | OUTPATIENT
Start: 2024-12-19 | End: 2024-12-19

## 2024-12-19 RX ORDER — ACETAMINOPHEN 325 MG/1
650 TABLET ORAL EVERY 6 HOURS PRN
Status: DISCONTINUED | OUTPATIENT
Start: 2024-12-19 | End: 2024-12-20 | Stop reason: HOSPADM

## 2024-12-19 RX ORDER — ACETAMINOPHEN 650 MG/1
650 SUPPOSITORY RECTAL EVERY 6 HOURS PRN
Status: DISCONTINUED | OUTPATIENT
Start: 2024-12-19 | End: 2024-12-20 | Stop reason: HOSPADM

## 2024-12-19 RX ORDER — SODIUM CHLORIDE 0.9 % (FLUSH) 0.9 %
5-40 SYRINGE (ML) INJECTION EVERY 12 HOURS SCHEDULED
Status: DISCONTINUED | OUTPATIENT
Start: 2024-12-19 | End: 2024-12-20 | Stop reason: HOSPADM

## 2024-12-19 RX ORDER — ATORVASTATIN CALCIUM 40 MG/1
40 TABLET, FILM COATED ORAL NIGHTLY
Status: DISCONTINUED | OUTPATIENT
Start: 2024-12-19 | End: 2024-12-20 | Stop reason: HOSPADM

## 2024-12-19 RX ORDER — KETOROLAC TROMETHAMINE 15 MG/ML
15 INJECTION, SOLUTION INTRAMUSCULAR; INTRAVENOUS ONCE
Status: COMPLETED | OUTPATIENT
Start: 2024-12-19 | End: 2024-12-19

## 2024-12-19 RX ORDER — MAGNESIUM SULFATE IN WATER 40 MG/ML
2000 INJECTION, SOLUTION INTRAVENOUS PRN
Status: DISCONTINUED | OUTPATIENT
Start: 2024-12-19 | End: 2024-12-20 | Stop reason: HOSPADM

## 2024-12-19 RX ORDER — ONDANSETRON 2 MG/ML
4 INJECTION INTRAMUSCULAR; INTRAVENOUS EVERY 6 HOURS PRN
Status: DISCONTINUED | OUTPATIENT
Start: 2024-12-19 | End: 2024-12-20 | Stop reason: HOSPADM

## 2024-12-19 RX ADMIN — ACETAMINOPHEN 1000 MG: 500 TABLET ORAL at 06:50

## 2024-12-19 RX ADMIN — ASPIRIN 81 MG 81 MG: 81 TABLET ORAL at 15:40

## 2024-12-19 RX ADMIN — CETIRIZINE HYDROCHLORIDE 10 MG: 10 TABLET, FILM COATED ORAL at 15:40

## 2024-12-19 RX ADMIN — CEFEPIME 1000 MG: 1 INJECTION, POWDER, FOR SOLUTION INTRAMUSCULAR; INTRAVENOUS at 11:35

## 2024-12-19 RX ADMIN — ATORVASTATIN CALCIUM 40 MG: 40 TABLET, FILM COATED ORAL at 22:03

## 2024-12-19 RX ADMIN — AMLODIPINE BESYLATE 10 MG: 10 TABLET ORAL at 15:40

## 2024-12-19 RX ADMIN — CARVEDILOL 3.12 MG: 3.12 TABLET, FILM COATED ORAL at 18:40

## 2024-12-19 RX ADMIN — SODIUM CHLORIDE: 9 INJECTION, SOLUTION INTRAVENOUS at 23:55

## 2024-12-19 RX ADMIN — CEFEPIME 1000 MG: 1 INJECTION, POWDER, FOR SOLUTION INTRAMUSCULAR; INTRAVENOUS at 23:56

## 2024-12-19 RX ADMIN — SODIUM CHLORIDE, PRESERVATIVE FREE 10 ML: 5 INJECTION INTRAVENOUS at 22:03

## 2024-12-19 RX ADMIN — TIZANIDINE 4 MG: 4 TABLET ORAL at 22:03

## 2024-12-19 RX ADMIN — KETOROLAC TROMETHAMINE 15 MG: 15 INJECTION, SOLUTION INTRAMUSCULAR; INTRAVENOUS at 06:50

## 2024-12-19 ASSESSMENT — PAIN SCALES - GENERAL: PAINLEVEL_OUTOF10: 6

## 2024-12-19 ASSESSMENT — ENCOUNTER SYMPTOMS
SHORTNESS OF BREATH: 0
ABDOMINAL PAIN: 1
VOMITING: 0
NAUSEA: 0
DIARRHEA: 0

## 2024-12-19 ASSESSMENT — PAIN - FUNCTIONAL ASSESSMENT: PAIN_FUNCTIONAL_ASSESSMENT: 0-10

## 2024-12-19 NOTE — PROGRESS NOTES
Attending Physician Statement  I have discussed the case, including pertinent history and exam findings with the resident and the team.  I have seen and examined the patient and the key elements of the encounter have been performed by me.  I agree with the assessment, plan and orders as documented by the resident.      Review of Systems:   In addition to the pertinent positives and negatives as stated within HPI and the review of systems as documented in their notes, all other systems were reviewed when able to and are reported negative.    74 years old gentleman who presented to emergency department because of right-sided flank pain.  Patient was recently diagnosed with UTI,, was given Keflex.  It was then changed to Cipro.  Patient is still having some pain.  Complains of hematuria which has returned.  Recently underwent CT urogram, results were noted,  Urology evaluated the patient in ER and they will follow outpatient  They suspect pyelonephritis, we will check urine culture.  Previous culture was negative.  Since the patient failed multiple antibiotics, we will place the patient on cefepime and follow-up on the culture results.  Check blood and urine cultures  Patient is also on ASA and  Eliquis for PE. , resume when cleared by urology  Monitor blood pressure      Gerardo Atkinson MD, FACP  Attending Physician, Internal Medicine Service    Internal Medicine Residency Program  12/19/2024, 10:39 PM

## 2024-12-19 NOTE — ED NOTES
ED to inpatient nurses report      Chief Complaint:  Chief Complaint   Patient presents with    Flank Pain    Shortness of Breath     Present to ED from: home     MOA:     LOC: alert and orientated to name, place, date  Mobility: Requires assistance * 1  Oxygen Baseline: 0    Current needs required: 0   Pending ED orders: n/a  Present condition: stable    Why did the patient come to the ED? Pt to ED room 16 via triage with c/o flank pain. Pt states he was recently told he had a UTI. Pt states he has been taking prescriptions. Pt also states he felt short of breath   What is the plan? Admit, urology   Any procedures or intervention occur? labs  Any safety concerns??    Mental Status:  Level of Consciousness: Alert (0)    Psych Assessment:   Psychosocial  Psychosocial (WDL): Within Defined Limits  Vital signs   Vitals:    12/19/24 1408 12/19/24 1515 12/19/24 1530 12/19/24 1546   BP:  (!) 155/94 (!) 135/107 (!) 147/97   Pulse: 81 88 89 95   Resp:       Temp:       SpO2: 96% 95% 95% 91%        Vitals:  Patient Vitals for the past 24 hrs:   BP Temp Pulse Resp SpO2   12/19/24 1546 (!) 147/97 -- 95 -- 91 %   12/19/24 1530 (!) 135/107 -- 89 -- 95 %   12/19/24 1515 (!) 155/94 -- 88 -- 95 %   12/19/24 1408 -- -- 81 -- 96 %   12/19/24 1316 (!) 149/100 -- 86 -- 94 %   12/19/24 1232 -- -- 86 -- 93 %   12/19/24 1231 -- -- 99 -- 93 %   12/19/24 1230 (!) 168/117 -- 94 -- 92 %   12/19/24 1130 (!) 164/95 -- 71 -- 93 %   12/19/24 0915 (!) 149/106 -- 83 -- 94 %   12/19/24 0831 -- -- 94 -- 91 %   12/19/24 0745 (!) 137/95 -- 70 -- 91 %   12/19/24 0735 -- -- 97 -- 93 %   12/19/24 0730 (!) 144/132 -- 96 -- 92 %   12/19/24 0728 -- -- 72 -- 91 %   12/19/24 0717 -- -- 72 -- 95 %   12/19/24 0715 (!) 141/96 -- 75 -- --   12/19/24 0657 -- -- 98 -- 90 %   12/19/24 0610 -- -- 85 -- 95 %   12/19/24 0556 (!) 160/110 98.6 °F (37 °C) 99 18 93 %      Visit Vitals  BP (!) 147/97   Pulse 95   Temp 98.6 °F (37 °C)   Resp 18   SpO2 91%        LDAs:  Medications    ACETAMINOPHEN (APAP EXTRA STRENGTH) 500 MG TABLET    Take 2 tablets by mouth every 6 hours as needed for Pain    ALBUTEROL (PROVENTIL) (2.5 MG/3ML) 0.083% NEBULIZER SOLUTION    Take 3 mLs by nebulization every 6 hours as needed for Wheezing    ALBUTEROL SULFATE HFA (PROVENTIL;VENTOLIN;PROAIR) 108 (90 BASE) MCG/ACT INHALER    Inhale 2 puffs into the lungs 4 times daily as needed for Wheezing    AMLODIPINE (NORVASC) 10 MG TABLET    Take 1 tablet by mouth daily    APIXABAN (ELIQUIS) 5 MG TABS TABLET    Take 1 tablet by mouth 2 times daily    ASPIRIN 81 MG CHEWABLE TABLET    Take 1 tablet by mouth daily    ATORVASTATIN (LIPITOR) 40 MG TABLET    Take 1 tablet by mouth nightly    BUDESONIDE-FORMOTEROL (SYMBICORT) 160-4.5 MCG/ACT AERO    Inhale 2 puffs into the lungs 2 times daily    CARVEDILOL (COREG) 3.125 MG TABLET    Take 1 tablet by mouth 2 times daily (with meals)    CEPHALEXIN (KEFLEX) 500 MG CAPSULE    Take 1 capsule by mouth 2 times daily for 7 days    CETIRIZINE (ZYRTEC) 10 MG TABLET    Take 1 tablet by mouth daily    CHOLECALCIFEROL (VITAMIN D) 50 MCG (2000 UT) CAPS CAPSULE    Take 1 capsule by mouth daily    CIPROFLOXACIN (CIPRO) 500 MG TABLET    Take 1 tablet by mouth 2 times daily for 7 days    CLOPIDOGREL (PLAVIX) 75 MG TABLET    Take 1 tablet by mouth daily    FLUTICASONE-SALMETEROL (ADVAIR DISKUS) 100-50 MCG/ACT AEPB DISKUS INHALER    Inhale 1 puff into the lungs in the morning and 1 puff in the evening.    METHOCARBAMOL (ROBAXIN) 500 MG TABLET    Take 1 tablet by mouth 4 times daily    OMEPRAZOLE (PRILOSEC) 40 MG DELAYED RELEASE CAPSULE        TIOTROPIUM (SPIRIVA HANDIHALER) 18 MCG INHALATION CAPSULE    Inhale 1 capsule into the lungs daily    TIZANIDINE (ZANAFLEX) 4 MG TABLET         Orders Placed This Encounter   Medications    acetaminophen (TYLENOL) tablet 1,000 mg    ketorolac (TORADOL) injection 15 mg    cefepime (MAXIPIME) 1,000 mg in sodium chloride 0.9 % 50 mL IVPB (mini-bag)

## 2024-12-19 NOTE — ED NOTES
Pt to ED room 16 via triage with c/o flank pain. Pt states he was recently told he had a UTI. Pt states he has been taking prescriptions. Pt also states he felt short of breath. Pt O2 saturation 85% on RA. Pt states he has a history of COPD, does not wear O2. Pt denies chest pain. Pt denies painful urination, denies blood in urine. Pt placed on continuous cardiac monitor, IV established, labs drawn, EKG completed.     Pt A&Ox4 and speaking in complete sentences. Call light within reach.

## 2024-12-19 NOTE — H&P
[facility]     Department of Internal Medicine - Staff Internal Medicine Teaching Service          ADMISSION NOTE/HISTORY AND PHYSICAL EXAMINATION   Date: 12/19/2024  Patient Name: Jacky Olivares  Date of admission: 12/19/2024  5:51 AM  YOB: 1950  PCP: Jenise Song APRN - CNP  History Obtained From:  patient, electronic medical record    CHIEF COMPLAINT     Chief complaint: Flank pain    HISTORY OF PRESENTING ILLNESS     The patient is a 74 y.o. male with a PMH significant for CAD, HTN, HLD, COPD, hx PE, hx CVA, tobacco abuse, presenting with hematuria, flank pain.  Patient had a recent ED visit on 12/15 for hematuria, found to have UTI, treated with Keflex and discharged.  Presented on 12/17 to ED with complaints of right flank pain, found to have persistent UTI and hydronephrosis, patient was seen by urology, CTAP showed ?calyx lesion, patient had CT urogram performed which was inconclusive due to patient motion artifact.  Some concern for malignancy based on CT results.  Patient was discharged with ciprofloxacin.  Presented today with continued flank pain, new shortness of breath.  Denied headache, dizziness, lightheadedness, chest pain, shortness of breath, abdominal pain, constipation, diarrhea, nausea, vomiting.  Endorsed right flank pain, hematuria, no dysuria, frequency, urgency.      In ED, patient was afebrile, borderline tachycardic, hypertensive to 160/110, maintaining 90-96% sat RA.  Labs notable for WBC 11.1, Hb 12.5 (appears baseline), MCV 72.3, platelets 212.  BMP grossly WNL.  Creatinine 1.2, appears near baseline.  No documented history of CKD.  12/17 urinalysis with positive nitrite, trace leukocyte esterase, red turbid urine.  12/15 urinalysis showed positive nitrite, large leukocyte esterase.  12/17 urine culture with no organisms.  Recent outpatient labs show TSH, folate, B12, magnesium WNL.  12/17 CT AP showed mild right hydroureteronephrosis, no

## 2024-12-19 NOTE — ED PROVIDER NOTES
[facility] EMERGENCY DEPARTMENT  Emergency Department  Emergency Medicine Resident Sign-out     Care of Jacky Olivares was assumed from Dr. Mooney and is being seen for Flank Pain and Shortness of Breath  .  The patient's initial evaluation and plan have been discussed with the prior provider who initially evaluated the patient.     EMERGENCY DEPARTMENT COURSE / MEDICAL DECISION MAKING:       MEDICATIONS GIVEN:  Orders Placed This Encounter   Medications    acetaminophen (TYLENOL) tablet 1,000 mg    ketorolac (TORADOL) injection 15 mg    cefepime (MAXIPIME) 1,000 mg in sodium chloride 0.9 % 50 mL IVPB (mini-bag)     Order Specific Question:   Antimicrobial Indications     Answer:   Urinary Tract Infection       LABS / RADIOLOGY:     Labs Reviewed   CBC WITH AUTO DIFFERENTIAL - Abnormal; Notable for the following components:       Result Value    Hemoglobin 12.5 (*)     Hematocrit 38.6 (*)     MCV 72.3 (*)     MCH 23.4 (*)     RDW 18.2 (*)     Neutrophils % 66 (*)     Lymphocytes % 23 (*)     All other components within normal limits   BASIC METABOLIC PANEL       CT UROGRAM    Result Date: 12/17/2024  EXAMINATION: CT UROGRAM 12/17/2024 7:26 pm TECHNIQUE: CT of the abdomen and pelvis was performed with and without the administration of intravenous contrast. Multiplanar reformatted images are provided for review.  MIP urogram images were performed. Automated exposure control, iterative reconstruction, and/or weight based adjustment of the mA/kV was utilized to reduce the radiation dose to as low as reasonably achievable. COMPARISON: Unenhanced CT abdomen and pelvis from today. HISTORY: ORDERING SYSTEM PROVIDED HISTORY: hematuria, mild right hydronephrosis. Examine for filling defects within renal collecting  PROVIDED HISTORY: hematuria, mild right hydronephrosis. Examine for filling defects within renal collecting system Additional Contrast?->1 Reason for Exam: hematuria, mild right  images are provided for review. Automated exposure control, iterative reconstruction, and/or weight based adjustment of the mA/kV was utilized to reduce the radiation dose to as low as reasonably achievable. COMPARISON: None. HISTORY: ORDERING SYSTEM PROVIDED HISTORY: right sided worsening flank pain eval for stone TECHNOLOGIST PROVIDED HISTORY: right sided worsening flank pain eval for stone Decision Support Exception - unselect if not a suspected or confirmed emergency medical condition->Emergency Medical Condition (MA) Reason for Exam: right sided worsening flank pain eval for stone FINDINGS: Exam is degraded by streak artifact from left hip hardware. LOWER CHEST:  Visualized portion of the lower chest demonstrates no acute abnormality. KIDNEYS AND URINARY TRACT: Mild right hydroureteronephrosis.  The course of the distal ureter is not well seen.  No convincing obstructing stone. Amorphous hyperdensity within an interpolar calyx on the right on series 2, image 64. ORGANS: Visualized portions of the unenhanced liver, spleen, pancreas, and adrenal glands demonstrate no acute abnormality. No inflammatory changes in the gallbladder fossa. GI/BOWEL: No bowel obstruction.  No evidence of acute appendicitis. PELVIS: The bladder and pelvic organs are unremarkable. PERITONEUM/RETROPERITONEUM: No lymphadenopathy is noted. BONES/SOFT TISSUES: The osseous structures demonstrate no acute abnormality.     Exam is degraded by streak artifact from left hip hardware. 1. Mild right hydroureteronephrosis.  The course of the distal ureter is not well seen.  No convincing obstructive stone. 2. Amorphous hyperdensity within an interpolar calyx on the right.  Unclear whether this is debris, hemorrhage or upper tract urothelial neoplasm. Recommend short interval follow-up CT urography.       RECENT VITALS:     Temp: 98.6 °F (37 °C),  Pulse: 83, Respirations: 18, BP: (!) 149/106, SpO2: 94 %    This patient is a 74 y.o. Male with

## 2024-12-19 NOTE — ED PROVIDER NOTES
[facility] EMERGENCY DEPARTMENT     Emergency Department     Faculty Attestation        I performed a history and physical examination of the patient and discussed management with the resident. I reviewed the resident’s note and agree with the documented findings and plan of care. Any areas of disagreement are noted on the chart. I was personally present for the key portions of any procedures. I have documented in the chart those procedures where I was not present during the key portions. I have reviewed the emergency nurses triage note. I agree with the chief complaint, past medical history, past surgical history, allergies, medications, social and family history as documented unless otherwise noted below.    For mid-level providers such as nurse practitioners as well as physicians assistants:    I have personally seen and evaluated the patient.    I find the patient's history and physical exam are consistent with NP/PA documentation.  I agree with the care provided, treatment rendered, disposition, & follow-up plan.     Additional findings are as noted.    Vital Signs: BP (!) 160/110   Pulse 85   Temp 98.6 °F (37 °C)   Resp 18   SpO2 95%   PCP:  Jenise Song APRN - CNP    Pertinent Comments:     75 y/o M w/ SOB and flank pain.    Patient was recently started on antibiotics for urinary tract infection.  Presented on 12/7 with right flank pain and some persistent UTI and antibiotics were switched.  Also had a CT urogram done which was indeterminate at that time with there was some concern for potential malignancy.  Patient presents back to the emergency department today for sudden increased pain again.  Did state that when he was discharged in the emergency department previously he did have some improvement and some clearing of his urine before the hematuria restarted again.    At time of initial evaluation patient was in no acute distress, vital signs are stable.

## 2024-12-19 NOTE — CONSULTS
Dannie Lui MD [facility]    Urology Consultation    Patient:  Jacky Olivares  MRN: [id number]  YOB: 1950    CHIEF COMPLAINT: Flank pain    HISTORY OF PRESENT ILLNESS:   The patient is a 74 y.o. male who presents with flank pain and hematuria.  Patient was previously seen twice in the emergency room over the past 5 days.  She presented on 12/15 due to hematuria and pain with urination.  He was treated empirically on antibiotics and sent home.  Unfortunate no culture was sent.  He returned 2 days later due to worsening hematuria.  Underwent a CT scan which showed some abnormality and possibly the renal pelvis.  He went to CT urogram which confirmed no such abnormality.  A CT CT urogram was negative.  He was discharged ultimately on Cipro for 7 additional days and will and was told to follow-up.  Unfortunately pain worsened and return to the emergency room today.    Patient was resting comfortably.  However when patient was palpated there was noticeable pain and grimacing as well as he visibly moved from the table.    He continues to have hematuria.  He is able to void without any clots.    Creatinine normal 1.2  Hemoglobin normal  White blood cell count normal.    Patient's old records, notes and chart reviewed and summarized above.    Past Medical History:    Past Medical History:   Diagnosis Date    Arthritis     CAD (coronary artery disease)     Headache(784.0)     Hyperlipidemia     Hypertension     Pneumonia     Unspecified cerebral artery occlusion with cerebral infarction        Past Surgical History:    Past Surgical History:   Procedure Laterality Date    CARDIAC PROCEDURE N/A 5/17/2024    Left heart cath / coronary angiography performed by Myke Weathers MD at [facility] CARDIAC CATH LAB    FRACTURE SURGERY       Previous  surgery: none     Medications:    Scheduled Meds:   amLODIPine  10 mg Oral Daily    [Held by provider] apixaban  5 mg Oral BID    aspirin  81 mg Oral Daily    atorvastatin

## 2024-12-19 NOTE — ED PROVIDER NOTES
tenderness. There is no left CVA tenderness, guarding or rebound.   Musculoskeletal:         General: Normal range of motion.   Skin:     General: Skin is warm and dry.   Neurological:      General: No focal deficit present.      Mental Status: He is alert and oriented to person, place, and time.   Psychiatric:         Mood and Affect: Mood normal.         Behavior: Behavior normal.           DDX/DIAGNOSTIC RESULTS / EMERGENCY DEPARTMENT COURSE / MDM     Medical Decision Making  Amount and/or Complexity of Data Reviewed  Labs: ordered. Decision-making details documented in ED Course.    Risk  OTC drugs.  Prescription drug management.        EMERGENCY DEPARTMENT COURSE:  Jacky Olivares is a 74 y.o. male who presented to the ED with c/o right flank pain and hematuria.  Currently taking Cipro for UTI acute pyelonephritis.  While in the department, patient remained hemodynamically stable, afebrile.  He is maintaining oxygenation on room air.  No leukocytosis, hemoglobin stable.  No significant electrolyte abnormalities.  Discussed case with urology who will evaluate patient at bedside.  Final disposition pending urology evaluation.  Ongoing care handed over to oncoming ED resident.      ED Course as of 12/19/24 0830   Thu Dec 19, 2024   0655 Hemoglobin Quant(!): 12.5 [HR]   0816 WBC: 11.1 [SK]   0816 Platelet, Fluorescence: 212 [SK]   0816 Sodium: 141 [SK]   0816 Potassium: 4.2 [SK]   0816 Creatinine: 1.2 [SK]      ED Course User Index  [HR] Stella Mooney DO  [SK] Jenn Vera DO       CONSULTS:  IP CONSULT TO UROLOGY    FINAL IMPRESSION      UTI  Acute pyelonephritis    DISPOSITION / PLAN     DISPOSITION; pending urology evaluation                 PATIENT REFERRED TO:  No follow-up provider specified.    DISCHARGE MEDICATIONS:  New Prescriptions    No medications on file       Stella Mooney DO  Emergency Medicine Resident    (Please note that portions of this note were completed with a voice recognition

## 2024-12-19 NOTE — ED NOTES
ED to inpatient nurses report      Chief Complaint:  Chief Complaint   Patient presents with    Flank Pain    Shortness of Breath     Present to ED from: home    MOA:     LOC: alert and orientated to name, place, date  Mobility: Requires assistance * 1  Oxygen Baseline: 0    Current needs required: 0   Pending ED orders: n/a  Present condition: stable     Why did the patient come to the ED? Pt to ED room 16 via triage with c/o flank pain. Pt states he was recently told he had a UTI. Pt states he has been taking prescriptions. Pt also states he felt short of breath   What is the plan? Urology   Any procedures or intervention occur? labs  Any safety concerns?? N/a     Mental Status:  Level of Consciousness: Alert (0)    Psych Assessment:   Psychosocial  Psychosocial (WDL): Within Defined Limits  Vital signs   Vitals:    12/19/24 0745 12/19/24 0831 12/19/24 0915 12/19/24 1130   BP: (!) 137/95  (!) 149/106 (!) 164/95   Pulse: 70 94 83 71   Resp:       Temp:       SpO2: 91% 91% 94% 93%        Vitals:  Patient Vitals for the past 24 hrs:   BP Temp Pulse Resp SpO2   12/19/24 1130 (!) 164/95 -- 71 -- 93 %   12/19/24 0915 (!) 149/106 -- 83 -- 94 %   12/19/24 0831 -- -- 94 -- 91 %   12/19/24 0745 (!) 137/95 -- 70 -- 91 %   12/19/24 0735 -- -- 97 -- 93 %   12/19/24 0730 (!) 144/132 -- 96 -- 92 %   12/19/24 0728 -- -- 72 -- 91 %   12/19/24 0717 -- -- 72 -- 95 %   12/19/24 0715 (!) 141/96 -- 75 -- --   12/19/24 0657 -- -- 98 -- 90 %   12/19/24 0610 -- -- 85 -- 95 %   12/19/24 0556 (!) 160/110 98.6 °F (37 °C) 99 18 93 %      Visit Vitals  BP (!) 164/95   Pulse 71   Temp 98.6 °F (37 °C)   Resp 18   SpO2 93%        LDAs:   Peripheral IV 12/19/24 Left;Proximal Forearm (Active)   Site Assessment Clean, dry & intact 12/19/24 0609   Line Status Blood return noted;Flushed;Specimen collected 12/19/24 0609   Phlebitis Assessment No symptoms 12/19/24 0609   Infiltration Assessment 0 12/19/24 0609   Dressing Status New dressing applied

## 2024-12-20 VITALS
WEIGHT: 155.65 LBS | DIASTOLIC BLOOD PRESSURE: 98 MMHG | OXYGEN SATURATION: 94 % | HEART RATE: 84 BPM | RESPIRATION RATE: 16 BRPM | TEMPERATURE: 97.9 F | HEIGHT: 74 IN | BODY MASS INDEX: 19.98 KG/M2 | SYSTOLIC BLOOD PRESSURE: 142 MMHG

## 2024-12-20 PROBLEM — N12 PYELONEPHRITIS: Status: ACTIVE | Noted: 2024-12-20

## 2024-12-20 LAB
ANION GAP SERPL CALCULATED.3IONS-SCNC: 9 MMOL/L (ref 9–16)
BACTERIA URNS QL MICRO: ABNORMAL
BASOPHILS # BLD: 0.05 K/UL (ref 0–0.2)
BASOPHILS NFR BLD: 1 % (ref 0–2)
BILIRUB UR QL STRIP: ABNORMAL
BUN SERPL-MCNC: 13 MG/DL (ref 8–23)
CALCIUM SERPL-MCNC: 9.4 MG/DL (ref 8.6–10.4)
CASTS #/AREA URNS LPF: ABNORMAL /LPF (ref 0–8)
CHLORIDE SERPL-SCNC: 104 MMOL/L (ref 98–107)
CLARITY UR: ABNORMAL
CO2 SERPL-SCNC: 28 MMOL/L (ref 20–31)
COLOR UR: ABNORMAL
CREAT SERPL-MCNC: 1 MG/DL (ref 0.7–1.2)
EKG ATRIAL RATE: 102 BPM
EKG P AXIS: 69 DEGREES
EKG P-R INTERVAL: 204 MS
EKG Q-T INTERVAL: 352 MS
EKG QRS DURATION: 88 MS
EKG QTC CALCULATION (BAZETT): 458 MS
EKG R AXIS: -79 DEGREES
EKG T AXIS: 86 DEGREES
EKG VENTRICULAR RATE: 102 BPM
EOSINOPHIL # BLD: 0.09 K/UL (ref 0–0.44)
EOSINOPHILS RELATIVE PERCENT: 1 % (ref 1–4)
EPI CELLS #/AREA URNS HPF: ABNORMAL /HPF (ref 0–5)
ERYTHROCYTE [DISTWIDTH] IN BLOOD BY AUTOMATED COUNT: 17.7 % (ref 11.8–14.4)
GFR, ESTIMATED: 80 ML/MIN/1.73M2
GLUCOSE SERPL-MCNC: 101 MG/DL (ref 74–99)
GLUCOSE UR STRIP-MCNC: NEGATIVE MG/DL
HCT VFR BLD AUTO: 39.3 % (ref 40.7–50.3)
HGB BLD-MCNC: 12.1 G/DL (ref 13–17)
HGB UR QL STRIP.AUTO: ABNORMAL
IMM GRANULOCYTES # BLD AUTO: <0.03 K/UL (ref 0–0.3)
IMM GRANULOCYTES NFR BLD: 0 %
KETONES UR STRIP-MCNC: NEGATIVE MG/DL
LEUKOCYTE ESTERASE UR QL STRIP: ABNORMAL
LYMPHOCYTES NFR BLD: 1.95 K/UL (ref 1.1–3.7)
LYMPHOCYTES RELATIVE PERCENT: 20 % (ref 24–43)
MCH RBC QN AUTO: 22.9 PG (ref 25.2–33.5)
MCHC RBC AUTO-ENTMCNC: 30.8 G/DL (ref 28.4–34.8)
MCV RBC AUTO: 74.3 FL (ref 82.6–102.9)
MONOCYTES NFR BLD: 0.75 K/UL (ref 0.1–1.2)
MONOCYTES NFR BLD: 8 % (ref 3–12)
NEUTROPHILS NFR BLD: 71 % (ref 36–65)
NEUTS SEG NFR BLD: 7.01 K/UL (ref 1.5–8.1)
NITRITE UR QL STRIP: POSITIVE
NRBC BLD-RTO: 0 PER 100 WBC
PH UR STRIP: 7 [PH] (ref 5–8)
PLATELET # BLD AUTO: ABNORMAL K/UL (ref 138–453)
PLATELET, FLUORESCENCE: 210 K/UL (ref 138–453)
PLATELETS.RETICULATED NFR BLD AUTO: 6.8 % (ref 1.1–10.3)
POTASSIUM SERPL-SCNC: 4.1 MMOL/L (ref 3.7–5.3)
PROT UR STRIP-MCNC: ABNORMAL MG/DL
RBC # BLD AUTO: 5.29 M/UL (ref 4.21–5.77)
RBC # BLD: ABNORMAL 10*6/UL
RBC #/AREA URNS HPF: ABNORMAL /HPF (ref 0–4)
SODIUM SERPL-SCNC: 141 MMOL/L (ref 136–145)
SP GR UR STRIP: 1.02 (ref 1–1.03)
UROBILINOGEN UR STRIP-ACNC: NORMAL EU/DL (ref 0–1)
WBC #/AREA URNS HPF: ABNORMAL /HPF (ref 0–5)
WBC OTHER # BLD: 9.9 K/UL (ref 3.5–11.3)

## 2024-12-20 PROCEDURE — 6360000002 HC RX W HCPCS

## 2024-12-20 PROCEDURE — 94640 AIRWAY INHALATION TREATMENT: CPT

## 2024-12-20 PROCEDURE — 83516 IMMUNOASSAY NONANTIBODY: CPT

## 2024-12-20 PROCEDURE — 86225 DNA ANTIBODY NATIVE: CPT

## 2024-12-20 PROCEDURE — 80048 BASIC METABOLIC PNL TOTAL CA: CPT

## 2024-12-20 PROCEDURE — 94761 N-INVAS EAR/PLS OXIMETRY MLT: CPT

## 2024-12-20 PROCEDURE — 2500000003 HC RX 250 WO HCPCS

## 2024-12-20 PROCEDURE — 2580000003 HC RX 258

## 2024-12-20 PROCEDURE — 86038 ANTINUCLEAR ANTIBODIES: CPT

## 2024-12-20 PROCEDURE — 81001 URINALYSIS AUTO W/SCOPE: CPT

## 2024-12-20 PROCEDURE — 99232 SBSQ HOSP IP/OBS MODERATE 35: CPT | Performed by: INTERNAL MEDICINE

## 2024-12-20 PROCEDURE — 6370000000 HC RX 637 (ALT 250 FOR IP)

## 2024-12-20 PROCEDURE — 85025 COMPLETE CBC W/AUTO DIFF WBC: CPT

## 2024-12-20 PROCEDURE — 36415 COLL VENOUS BLD VENIPUNCTURE: CPT

## 2024-12-20 PROCEDURE — 85055 RETICULATED PLATELET ASSAY: CPT

## 2024-12-20 PROCEDURE — 87086 URINE CULTURE/COLONY COUNT: CPT

## 2024-12-20 RX ORDER — MORPHINE SULFATE 2 MG/ML
2 INJECTION, SOLUTION INTRAMUSCULAR; INTRAVENOUS EVERY 6 HOURS PRN
Status: DISCONTINUED | OUTPATIENT
Start: 2024-12-20 | End: 2024-12-20 | Stop reason: HOSPADM

## 2024-12-20 RX ORDER — OXYCODONE HYDROCHLORIDE 5 MG/1
5 TABLET ORAL EVERY 6 HOURS PRN
Status: DISCONTINUED | OUTPATIENT
Start: 2024-12-20 | End: 2024-12-20 | Stop reason: HOSPADM

## 2024-12-20 RX ADMIN — MORPHINE SULFATE 2 MG: 2 INJECTION, SOLUTION INTRAMUSCULAR; INTRAVENOUS at 15:24

## 2024-12-20 RX ADMIN — CARVEDILOL 3.12 MG: 3.12 TABLET, FILM COATED ORAL at 17:02

## 2024-12-20 RX ADMIN — AMLODIPINE BESYLATE 10 MG: 10 TABLET ORAL at 09:15

## 2024-12-20 RX ADMIN — OXYCODONE 5 MG: 5 TABLET ORAL at 11:21

## 2024-12-20 RX ADMIN — SODIUM CHLORIDE, PRESERVATIVE FREE 10 ML: 5 INJECTION INTRAVENOUS at 08:51

## 2024-12-20 RX ADMIN — OXYCODONE 5 MG: 5 TABLET ORAL at 17:02

## 2024-12-20 RX ADMIN — CETIRIZINE HYDROCHLORIDE 10 MG: 10 TABLET, FILM COATED ORAL at 08:51

## 2024-12-20 RX ADMIN — BUDESONIDE AND FORMOTEROL FUMARATE DIHYDRATE 2 PUFF: 160; 4.5 AEROSOL RESPIRATORY (INHALATION) at 08:36

## 2024-12-20 RX ADMIN — CEFEPIME 1000 MG: 1 INJECTION, POWDER, FOR SOLUTION INTRAMUSCULAR; INTRAVENOUS at 13:05

## 2024-12-20 RX ADMIN — ACETAMINOPHEN 650 MG: 325 TABLET ORAL at 10:02

## 2024-12-20 RX ADMIN — CARVEDILOL 3.12 MG: 3.12 TABLET, FILM COATED ORAL at 08:51

## 2024-12-20 ASSESSMENT — ENCOUNTER SYMPTOMS
VOMITING: 0
NAUSEA: 0
SHORTNESS OF BREATH: 0
COUGH: 0
SORE THROAT: 0
DIARRHEA: 0
SINUS PAIN: 0
CHOKING: 0
BLOOD IN STOOL: 0
CONSTIPATION: 0
ABDOMINAL DISTENTION: 0
ABDOMINAL PAIN: 0
RHINORRHEA: 0
CHEST TIGHTNESS: 0
ANAL BLEEDING: 0

## 2024-12-20 ASSESSMENT — PAIN SCALES - GENERAL
PAINLEVEL_OUTOF10: 8
PAINLEVEL_OUTOF10: 7
PAINLEVEL_OUTOF10: 8
PAINLEVEL_OUTOF10: 7
PAINLEVEL_OUTOF10: 9
PAINLEVEL_OUTOF10: 6
PAINLEVEL_OUTOF10: 5
PAINLEVEL_OUTOF10: 6

## 2024-12-20 ASSESSMENT — PAIN DESCRIPTION - DESCRIPTORS: DESCRIPTORS: SHARP

## 2024-12-20 ASSESSMENT — PAIN DESCRIPTION - LOCATION: LOCATION: FLANK

## 2024-12-20 ASSESSMENT — PAIN DESCRIPTION - ORIENTATION: ORIENTATION: RIGHT

## 2024-12-20 NOTE — PROGRESS NOTES
Pt states he is not getting any pain relief from roxicodone and morphine, dissatisfied with his care, states that his son was on his way to pick him up and take him to [facility],  writer informed pt that he would not be discharged and would have to sign himself out against medical advice, encouraged him to stay and get treatment, pt declined and signed AMA paperwork, IV discontinued by writer, pt left with son and daughter per ambulatory with all belongings, medicine team notified. Electronically signed by KARINA SMITH RN on 12/20/2024 at 6:15 PM

## 2024-12-20 NOTE — PLAN OF CARE
Problem: Safety - Adult  Goal: Free from fall injury  Outcome: Progressing     Problem: Chronic Conditions and Co-morbidities  Goal: Patient's chronic conditions and co-morbidity symptoms are monitored and maintained or improved  12/20/2024 0218 by Kinza Degroot RN  Outcome: Progressing  12/20/2024 0218 by Kinza Degroot RN  Outcome: Progressing     Problem: Discharge Planning  Goal: Discharge to home or other facility with appropriate resources  12/20/2024 0218 by Kinza Degroot RN  Outcome: Progressing  12/20/2024 0218 by Kinza Degroot RN  Outcome: Progressing

## 2024-12-20 NOTE — PROGRESS NOTES
Urology Progress Note      Patient:  Jacky Olivares  MRN: [id number]  YOB: 1950    SUBJECTIVE:   No acute events  Admitted yesterday for hematuria and flank giles  NOHEMY negative for any acute findings    Labs within normal limits  Ucx pending but Ucx 12/17 from negative    Physical Exam:    Constitutional: Patient in no acute distress.  Neuro: alert and oriented to person place and time.   Skin: No rashes or bruising present.  Lungs: Respiratory effort normal.  Cardiovascular:  Regular rhythm.  Abdomen: Soft, non-tender, mild right CVA tenderness    Labs:  Recent Labs     12/17/24  1511 12/19/24  0630 12/20/24  0534   WBC 9.5 11.1 9.9   HGB 12.5* 12.5* 12.1*   HCT 39.7* 38.6* 39.3*   MCV 74.5* 72.3* 74.3*   PLT See Reflexed IPF Result See Reflexed IPF Result See Reflexed IPF Result     Recent Labs     12/17/24  1511 12/19/24  0630 12/20/24  0534    141 141   K 4.5 4.2 4.1    104 104   CO2 29 27 28   BUN 20 18 13   CREATININE 1.1 1.2 1.0       Recent Labs     12/20/24  0604   COLORU Red*   PHUR 7.0   WBCUA 0 TO 2   RBCUA TOO NUMEROUS TO COUNT   BACTERIA None   LEUKOCYTESUR MODERATE*   UROBILINOGEN Normal   BILIRUBINUR MOD*       Culture Results:  @[facility]@      -----------------------------------------------------------------  Imaging Results:  US RENAL COMPLETE    Result Date: 12/19/2024  EXAMINATION: RETROPERITONEAL ULTRASOUND OF THE KIDNEYS AND URINARY BLADDER 12/19/2024 COMPARISON: CT 12/17/2024 HISTORY: ORDERING SYSTEM PROVIDED HISTORY: hematuria, c/f mass on CT TECHNOLOGIST PROVIDED HISTORY: hematuria, c/f mass on CT FINDINGS: Kidneys: The right kidney measures 11.1 cm in length and the left kidney measures 10.8 cm in length. Kidneys demonstrate normal cortical echogenicity.  No evidence of hydronephrosis or definite intrarenal stones.  Simple appearing cyst in the left lower pole measuring up to 2.3 cm.  Few focal areas of cortical scarring noted bilaterally. Bladder: Bladder is not

## 2024-12-20 NOTE — PROGRESS NOTES
Occupational Therapy    [facility]  Occupational Therapy Not Seen Note    DATE: 2024    NAME: Jacky Olivares  MRN: [id number]   : 1950      Patient not seen this date for Occupational Therapy due to:    Patient independent with ADLs and functional tasks with no acute OT needs. Will defer OT evaluation at this time. Please reorder OT if future needs arise.     Next Scheduled Treatment: N/A    Electronically signed by GERALD Hyatt on 2024 at 10:05 AM

## 2024-12-20 NOTE — PROGRESS NOTES
[facility]  Internal Medicine Teaching Residency Program  Inpatient Daily Progress Note  ______________________________________________________________________________    Patient: Jacky Olivares  YOB: 1950   MRN:[id number]    Acct: [id number]     Room: 0319/0319-01  Admit date: 12/19/2024  Today's date: 12/20/24  Number of days in the hospital: 1    SUBJECTIVE   Admitting Diagnosis: Hematuria  CC: Flank pain    Patient seen and evaluated bedside.  No acute events overnight.    No acute complaints today.  Denies any urinary symptoms.  States urine \"is clearing up\".  Still red, but appears less turbid.    Renal US yesterday showed no obstructing stone, no collecting system dilatation, left lower pole simple cyst 2.3 cm, some cortical scarring.    Continues on cefepime for UTI pending final culture results.      BRIEF HISTORY     The patient is a 74 y.o. male with a PMH significant for CAD, HTN, HLD, COPD, hx PE, hx CVA, tobacco abuse, presenting with hematuria, flank pain.  Patient had a recent ED visit on 12/15 for hematuria, found to have UTI, treated with Keflex and discharged.  Presented on 12/17 to ED with complaints of right flank pain, found to have persistent UTI and hydronephrosis, patient was seen by urology, CTAP showed ?calyx lesion, patient had CT urogram performed which was inconclusive due to patient motion artifact.  Some concern for malignancy based on CT results.  Patient was discharged with ciprofloxacin.  Presented today with continued flank pain, new shortness of breath.  Denied headache, dizziness, lightheadedness, chest pain, shortness of breath, abdominal pain, constipation, diarrhea, nausea, vomiting.  Endorsed right flank pain, hematuria, no dysuria, frequency, urgency.       In ED, patient was afebrile, borderline tachycardic, hypertensive to 160/110, maintaining 90-96% sat RA.  Labs notable for WBC 11.1, Hb 12.5 (appears baseline),

## 2024-12-21 LAB
MICROORGANISM SPEC CULT: NO GROWTH
SERVICE CMNT-IMP: NORMAL
SPECIMEN DESCRIPTION: NORMAL

## 2024-12-23 LAB
EKG ATRIAL RATE: 102 BPM
EKG P AXIS: 69 DEGREES
EKG P-R INTERVAL: 204 MS
EKG Q-T INTERVAL: 352 MS
EKG QRS DURATION: 88 MS
EKG QTC CALCULATION (BAZETT): 458 MS
EKG R AXIS: -79 DEGREES
EKG T AXIS: 86 DEGREES
EKG VENTRICULAR RATE: 102 BPM

## 2024-12-23 PROCEDURE — 93010 ELECTROCARDIOGRAM REPORT: CPT | Performed by: INTERNAL MEDICINE

## 2024-12-24 LAB
ANA SER QL IA: NEGATIVE
ANCA MYELOPEROXIDASE: <0.3 AU/ML (ref 0–3.5)
ANCA PROTEINASE 3: <0.7 AU/ML (ref 0–2)
DSDNA IGG SER QL IA: 1 IU/ML
NUCLEAR IGG SER IA-RTO: 0.4 U/ML

## 2025-01-16 ENCOUNTER — OFFICE VISIT (OUTPATIENT)
Dept: PRIMARY CARE CLINIC | Age: 75
End: 2025-01-16
Payer: COMMERCIAL

## 2025-01-16 VITALS
SYSTOLIC BLOOD PRESSURE: 133 MMHG | OXYGEN SATURATION: 90 % | HEIGHT: 74 IN | BODY MASS INDEX: 21.69 KG/M2 | TEMPERATURE: 97.3 F | DIASTOLIC BLOOD PRESSURE: 89 MMHG | WEIGHT: 169 LBS | HEART RATE: 81 BPM

## 2025-01-16 DIAGNOSIS — R91.1 PULMONARY NODULE: ICD-10-CM

## 2025-01-16 DIAGNOSIS — J41.0 SIMPLE CHRONIC BRONCHITIS (HCC): ICD-10-CM

## 2025-01-16 DIAGNOSIS — Z09 HOSPITAL DISCHARGE FOLLOW-UP: Primary | ICD-10-CM

## 2025-01-16 PROCEDURE — 99213 OFFICE O/P EST LOW 20 MIN: CPT | Performed by: NURSE PRACTITIONER

## 2025-01-16 PROCEDURE — 1159F MED LIST DOCD IN RCRD: CPT | Performed by: NURSE PRACTITIONER

## 2025-01-16 PROCEDURE — 3079F DIAST BP 80-89 MM HG: CPT | Performed by: NURSE PRACTITIONER

## 2025-01-16 PROCEDURE — 1123F ACP DISCUSS/DSCN MKR DOCD: CPT | Performed by: NURSE PRACTITIONER

## 2025-01-16 PROCEDURE — 3075F SYST BP GE 130 - 139MM HG: CPT | Performed by: NURSE PRACTITIONER

## 2025-01-16 RX ORDER — TIOTROPIUM BROMIDE 18 UG/1
18 CAPSULE ORAL; RESPIRATORY (INHALATION) DAILY
Qty: 30 CAPSULE | Refills: 5 | Status: SHIPPED | OUTPATIENT
Start: 2025-01-16

## 2025-01-16 ASSESSMENT — PATIENT HEALTH QUESTIONNAIRE - PHQ9
1. LITTLE INTEREST OR PLEASURE IN DOING THINGS: NOT AT ALL
2. FEELING DOWN, DEPRESSED OR HOPELESS: NOT AT ALL
SUM OF ALL RESPONSES TO PHQ QUESTIONS 1-9: 0
SUM OF ALL RESPONSES TO PHQ QUESTIONS 1-9: 0
SUM OF ALL RESPONSES TO PHQ9 QUESTIONS 1 & 2: 0
SUM OF ALL RESPONSES TO PHQ QUESTIONS 1-9: 0
SUM OF ALL RESPONSES TO PHQ QUESTIONS 1-9: 0

## 2025-01-16 NOTE — PROGRESS NOTES
[address] [facility] MAIN [facility] [address]   1/23/2025    Jacky Olivares is a 74 y.o. male who presents today for his medical conditions and/or complaints as noted below.    Jacky Olivares is scheduled today for Hypertension  .      HPI:     History of Present Illness  The patient is here today for a hospital follow-up after four emergency room visits for acute kidney injury (GORAN) and pyelonephritis with gross hematuria.    He reports a significant improvement in condition, with no current issues related to urination or the presence of blood in urine. Has completed prescribed course of antibiotics from the hospital. Has an upcoming appointment with a urologist scheduled for 02/13/2025.    Admits to occasional non-compliance with medication regimen, particularly forgetting to take nightly dose of atorvastatin. Seeking advice on whether it would be beneficial to take this medication consistently during the day instead. Additionally, he mentions that he is nearing the end of current supply of capsules, with only 3 or 4 remaining. zdoes not require any other refills at this time.    SOCIAL HISTORY  The patient has a history of smoking.    MEDICATIONS  atorvastatin      DETAILS OF HOSPITAL STAY     Presenting Problem/History of Present Illness  Pyelonephritis [N12]  Acute kidney injury (CMS-HCC) [N17.9]  Increased oxygen demand [R68.89]  Jacky Olivares is a 74 y.o. male with COPD and hypertension who presents to the ED complaining of right-sided abdominal pain.  He has been to the hospital several times in the last few weeks and has been treated for right-sided pyelonephritis.  Most recently, he was admitted on 12/20 after failing outpatient treatment with Keflex and ciprofloxacin.  Of note, patient reports questionable medication compliance, and has told providers at the previous admission that he has not completed a full course of antibiotics for the pyelonephritis.  During the previous

## 2025-01-23 ASSESSMENT — ENCOUNTER SYMPTOMS
PHOTOPHOBIA: 0
CHEST TIGHTNESS: 0
SORE THROAT: 0
COUGH: 0
SINUS PRESSURE: 0
SHORTNESS OF BREATH: 0
DIARRHEA: 0
NAUSEA: 0
ABDOMINAL PAIN: 0
VOMITING: 0
COLOR CHANGE: 0
BACK PAIN: 0
SINUS PAIN: 0

## 2025-04-16 ENCOUNTER — OFFICE VISIT (OUTPATIENT)
Dept: PRIMARY CARE CLINIC | Age: 75
End: 2025-04-16
Payer: COMMERCIAL

## 2025-04-16 VITALS
HEART RATE: 89 BPM | OXYGEN SATURATION: 94 % | HEIGHT: 74 IN | BODY MASS INDEX: 21.87 KG/M2 | DIASTOLIC BLOOD PRESSURE: 102 MMHG | SYSTOLIC BLOOD PRESSURE: 150 MMHG | WEIGHT: 170.4 LBS

## 2025-04-16 DIAGNOSIS — R91.1 PULMONARY NODULE: ICD-10-CM

## 2025-04-16 DIAGNOSIS — Z13.1 SCREENING FOR DIABETES MELLITUS: ICD-10-CM

## 2025-04-16 DIAGNOSIS — I10 PRIMARY HYPERTENSION: Primary | ICD-10-CM

## 2025-04-16 DIAGNOSIS — Z87.891 PERSONAL HISTORY OF TOBACCO USE: ICD-10-CM

## 2025-04-16 DIAGNOSIS — E55.9 VITAMIN D DEFICIENCY: ICD-10-CM

## 2025-04-16 PROCEDURE — 3080F DIAST BP >= 90 MM HG: CPT | Performed by: NURSE PRACTITIONER

## 2025-04-16 PROCEDURE — 3077F SYST BP >= 140 MM HG: CPT | Performed by: NURSE PRACTITIONER

## 2025-04-16 PROCEDURE — 1123F ACP DISCUSS/DSCN MKR DOCD: CPT | Performed by: NURSE PRACTITIONER

## 2025-04-16 PROCEDURE — 99214 OFFICE O/P EST MOD 30 MIN: CPT | Performed by: NURSE PRACTITIONER

## 2025-04-16 PROCEDURE — 1159F MED LIST DOCD IN RCRD: CPT | Performed by: NURSE PRACTITIONER

## 2025-04-16 RX ORDER — AMOXICILLIN 250 MG
2 CAPSULE ORAL NIGHTLY PRN
COMMUNITY
Start: 2024-12-27

## 2025-04-16 ASSESSMENT — ENCOUNTER SYMPTOMS
SHORTNESS OF BREATH: 0
BACK PAIN: 0
PHOTOPHOBIA: 0
DIARRHEA: 0
SINUS PRESSURE: 0
SORE THROAT: 0
CHEST TIGHTNESS: 0
SINUS PAIN: 0
COUGH: 0
COLOR CHANGE: 0
VOMITING: 0
ABDOMINAL PAIN: 0
NAUSEA: 0

## 2025-04-16 NOTE — PATIENT INSTRUCTIONS
Albuterol Sulfate (inhaler): 4 times daily as needed for shortness of breath/wheezing    Symbicort (inhaler): 2 times daily EVERY DAY for COPD    Spiriva (inhaler): daily EVERY DAY  for COPD    Amlodipine 1 tablet daily for  high blood pressure    Apixaban (eliquis) 1 tablet every day for prevention of blood clots    Aspirin 1 tablet daily for stroke prevention    Atorvastatin 1 tablet nightly for cholesterol lowering and stroke prevention    Carvedilol 1 tablet twice daily for blood pressure and preventing heart failure    Plavix 1 tablet daily for stroke prevention

## 2025-04-16 NOTE — PROGRESS NOTES
[address] [facility] C[address] [place] MAIN FLOOR  [facility] [address]   4/16/2025    Jacky Olivares is a 74 y.o. male who presents today for his medical conditions and/or complaints as noted below.    Jacky Olivares is scheduled today for Follow-up  .      HPI:     History of Present Illness  The patient is a 74-year-old male who presents today for a routine follow-up.    The chief complaint includes non-compliance with antihypertensive medication due to stress, despite having a sufficient supply at home.     He is not experiencing any bothersome symptoms.    He has not been adhering to his prescribed medication regimen recently but expresses intent to restart them. A comprehensive list of medications along with their respective indications for use is requested. His bowel movements have shown improvement.    Care gaps addressed and ordered accordingly.  Return for AWV.       Vitals:    04/16/25 1159   BP: (!) 150/102   BP Site: Right Upper Arm   Patient Position: Sitting   BP Cuff Size: Medium Adult   Pulse: 89   SpO2: 94%   Weight: 77.3 kg (170 lb 6.4 oz)   Height: 1.88 m (6' 2\")      Past Medical History:   Diagnosis Date    Arthritis     CAD (coronary artery disease)     Headache(784.0)     Hyperlipidemia     Hypertension     Pneumonia     Unspecified cerebral artery occlusion with cerebral infarction       Past Surgical History:   Procedure Laterality Date    CARDIAC PROCEDURE N/A 5/17/2024    Left heart cath / coronary angiography performed by Myke Weathers MD at [facility] CARDIAC CATH LAB    FRACTURE SURGERY       Family History   Problem Relation Age of Onset    Cancer Mother         unknown    Other Mother         eye disease requiring cornea transplants    Cancer Brother         cancer    Cancer Maternal Grandfather         unknown     Social History     Tobacco Use    Smoking status: Every Day     Current packs/day: 1.00     Average packs/day: 1 pack/day for 31.1 years (31.1 ttl pk-yrs)     Types:

## 2025-05-06 ENCOUNTER — HOSPITAL ENCOUNTER (OUTPATIENT)
Dept: CT IMAGING | Age: 75
Discharge: HOME | End: 2025-05-08
Payer: COMMERCIAL

## 2025-05-06 ENCOUNTER — HOSPITAL ENCOUNTER (OUTPATIENT)
Age: 75
Discharge: HOME | End: 2025-05-06
Payer: COMMERCIAL

## 2025-05-06 DIAGNOSIS — R91.1 PULMONARY NODULE: ICD-10-CM

## 2025-05-06 DIAGNOSIS — Z13.1 SCREENING FOR DIABETES MELLITUS: ICD-10-CM

## 2025-05-06 DIAGNOSIS — Z87.891 PERSONAL HISTORY OF TOBACCO USE: ICD-10-CM

## 2025-05-06 DIAGNOSIS — E55.9 VITAMIN D DEFICIENCY: ICD-10-CM

## 2025-05-06 LAB
25(OH)D3 SERPL-MCNC: 29.5 NG/ML (ref 30–100)
EST. AVERAGE GLUCOSE BLD GHB EST-MCNC: 131 MG/DL
HBA1C MFR BLD: 6.2 % (ref 4–6)

## 2025-05-06 PROCEDURE — 83036 HEMOGLOBIN GLYCOSYLATED A1C: CPT

## 2025-05-06 PROCEDURE — 71271 CT THORAX LUNG CANCER SCR C-: CPT

## 2025-05-06 PROCEDURE — 36415 COLL VENOUS BLD VENIPUNCTURE: CPT

## 2025-05-06 PROCEDURE — 82306 VITAMIN D 25 HYDROXY: CPT

## 2025-05-07 ENCOUNTER — RESULTS FOLLOW-UP (OUTPATIENT)
Dept: PRIMARY CARE CLINIC | Age: 75
End: 2025-05-07

## 2025-05-12 ENCOUNTER — TELEPHONE (OUTPATIENT)
Age: 75
End: 2025-05-12

## 2025-05-12 DIAGNOSIS — R91.8 ABNORMAL CT SCAN OF LUNG: Primary | ICD-10-CM

## 2025-05-12 NOTE — PROGRESS NOTES
Spoke with patient directly.  Informed of significant increase in size of pulmonary nodule identified on CT abdomen pelvis in December 2024.  Of note, recommended follow-up chest CT was ordered, but not completed.  Given the suspicious findings, PET CT has been ordered along with pulmonology referral.  Provider also called pulmonology office to notify them of the referral.  Evaluation of CT requested and pulmonary office will reach out to patient.  On conversation with the patient, he states \"can you please call my daughter, I do not really understand anything you are saying and she needs to hear this\".  Patient's daughter, Samara, notified of the above.  She does report that she is patient's POA.  Correct phone number is on file.  I have also placed a referral to oncology nurse navigator to assist with coordination of care given patient's long history of noncompliance.  Both Samara and the patient notified they would likely be receiving a phone call within 24 hours for next steps.  All questions and concerns addressed by both parties.    Samara Infante: POA: [phone]

## 2025-05-12 NOTE — TELEPHONE ENCOUNTER
Name: Jacky Olivares  : 1950  MRN: [id number]    Oncology Navigation- Initial Note:  (Unknown)  Intake-  Contact Type: Telephone    Continuum of Care: Diagnosis/Active Treatment    Smoking hx: Every Day Smoker 31.2 pk yrs.  Smoking cessation assistance and resources provided     Notes:   Navigator received email from Duncan OLMOS regarding notification of referral. Pt. Having recent Lung Screening with concerning findings. Pt. Referred to pulmonary and PET order placed. Writer adding self to care team.   Electronically signed by Zeinab Pastrana RN on 2025 at 10:27 AM

## 2025-05-14 ENCOUNTER — OFFICE VISIT (OUTPATIENT)
Dept: PULMONOLOGY | Age: 75
End: 2025-05-14
Payer: COMMERCIAL

## 2025-05-14 VITALS
OXYGEN SATURATION: 95 % | BODY MASS INDEX: 20.92 KG/M2 | DIASTOLIC BLOOD PRESSURE: 88 MMHG | HEART RATE: 118 BPM | SYSTOLIC BLOOD PRESSURE: 128 MMHG | WEIGHT: 163 LBS | RESPIRATION RATE: 16 BRPM | HEIGHT: 74 IN

## 2025-05-14 DIAGNOSIS — J44.9 CHRONIC OBSTRUCTIVE PULMONARY DISEASE, UNSPECIFIED COPD TYPE (HCC): ICD-10-CM

## 2025-05-14 DIAGNOSIS — R91.8 LUNG MASS: Primary | ICD-10-CM

## 2025-05-14 DIAGNOSIS — R06.09 DYSPNEA ON EXERTION: ICD-10-CM

## 2025-05-14 DIAGNOSIS — Z87.891 HISTORY OF SMOKING GREATER THAN 50 PACK YEARS: ICD-10-CM

## 2025-05-14 PROCEDURE — 3074F SYST BP LT 130 MM HG: CPT | Performed by: INTERNAL MEDICINE

## 2025-05-14 PROCEDURE — 3079F DIAST BP 80-89 MM HG: CPT | Performed by: INTERNAL MEDICINE

## 2025-05-14 PROCEDURE — 1123F ACP DISCUSS/DSCN MKR DOCD: CPT | Performed by: INTERNAL MEDICINE

## 2025-05-14 PROCEDURE — 99204 OFFICE O/P NEW MOD 45 MIN: CPT | Performed by: INTERNAL MEDICINE

## 2025-05-14 PROCEDURE — 1159F MED LIST DOCD IN RCRD: CPT | Performed by: INTERNAL MEDICINE

## 2025-05-14 NOTE — PROGRESS NOTES
Status   12/20/2024 104 98 - 107 mmol/L Final   12/19/2024 104 98 - 107 mmol/L Final   12/17/2024 105 98 - 107 mmol/L Final     CO2   Date Value Ref Range Status   12/20/2024 28 20 - 31 mmol/L Final   12/19/2024 27 20 - 31 mmol/L Final   12/17/2024 29 20 - 31 mmol/L Final     BUN   Date Value Ref Range Status   12/20/2024 13 8 - 23 mg/dL Final   12/19/2024 18 8 - 23 mg/dL Final   12/17/2024 20 8 - 23 mg/dL Final     Creatinine   Date Value Ref Range Status   12/20/2024 1.0 0.7 - 1.2 mg/dL Final   12/19/2024 1.2 0.7 - 1.2 mg/dL Final   12/17/2024 1.1 0.7 - 1.2 mg/dL Final     Glucose   Date Value Ref Range Status   12/20/2024 101 (H) 74 - 99 mg/dL Final   12/19/2024 99 74 - 99 mg/dL Final   12/17/2024 87 74 - 99 mg/dL Final     Hepatic:   AST   Date Value Ref Range Status   12/15/2024 26 10 - 50 U/L Final   05/17/2024 32 10 - 50 U/L Final   03/22/2018 19 <40 U/L Final     ALT   Date Value Ref Range Status   12/15/2024 16 10 - 50 U/L Final   05/17/2024 8 (L) 10 - 50 U/L Final   03/22/2018 13 5 - 41 U/L Final     Total Bilirubin   Date Value Ref Range Status   12/15/2024 0.3 0.0 - 1.2 mg/dL Final   05/17/2024 0.6 0.00 - 1.20 mg/dL Final   03/22/2018 0.51 0.3 - 1.2 mg/dL Final     Alkaline Phosphatase   Date Value Ref Range Status   12/15/2024 54 40 - 129 U/L Final   05/17/2024 51 40 - 129 U/L Final   03/22/2018 61 40 - 129 U/L Final     Amylase:   Amylase   Date Value Ref Range Status   11/20/2014 62 28 - 100 U/L Final     Comment:     Performed at [facility] [address] [place] [address] [phone]       Lipase:   Lipase   Date Value Ref Range Status   11/20/2014 31 13 - 60 U/L Final     Comment:     Performed at [facility] [address] [place] [address] [phone]       CARDIAC ENZYMES:   Total CK   Date Value Ref Range Status   03/22/2018 122 39 - 308 U/L Final   07/18/2012 186 (H) 38 - 174 U/L Final     CK-MB   Date Value Ref Range Status   03/22/2018 1.1 <10.5 ng/mL

## 2025-05-15 ENCOUNTER — TELEPHONE (OUTPATIENT)
Dept: PRIMARY CARE CLINIC | Age: 75
End: 2025-05-15

## 2025-05-15 DIAGNOSIS — R91.8 ABNORMAL CT SCAN OF LUNG: Primary | ICD-10-CM

## 2025-05-15 NOTE — TELEPHONE ENCOUNTER
[facility] called to inform the pt order needs to say PET/CT skull base to mid tigh.

## 2025-05-16 ENCOUNTER — TELEPHONE (OUTPATIENT)
Age: 75
End: 2025-05-16

## 2025-05-16 NOTE — TELEPHONE ENCOUNTER
Name: Jacky Olivares  : 1950  MRN: [id number]    Oncology Navigation- Initial Note:    Intake-  Contact Type: Telephone    Continuum of Care: Diagnosis/Active Treatment    Smoking hx:     Notes: Navigator spoke with pt. Introducing self and offering available resources. Pt. Informing writer his daughter assist with his appts. Pt. Aware of upcoming PET.     Electronically signed by Zeinab Pastrana RN on 2025 at 3:20 PM

## 2025-05-21 ENCOUNTER — HOSPITAL ENCOUNTER (OUTPATIENT)
Dept: NUCLEAR MEDICINE | Age: 75
Discharge: HOME | End: 2025-05-23
Payer: COMMERCIAL

## 2025-05-21 DIAGNOSIS — R91.8 ABNORMAL CT SCAN OF LUNG: ICD-10-CM

## 2025-05-21 LAB — GLUCOSE BLD-MCNC: 96 MG/DL (ref 75–110)

## 2025-05-21 PROCEDURE — 2500000003 HC RX 250 WO HCPCS: Performed by: NURSE PRACTITIONER

## 2025-05-21 PROCEDURE — 3430000000 HC RX DIAGNOSTIC RADIOPHARMACEUTICAL: Performed by: NURSE PRACTITIONER

## 2025-05-21 PROCEDURE — A9609 HC RX DIAGNOSTIC RADIOPHARMACEUTICAL: HCPCS | Performed by: NURSE PRACTITIONER

## 2025-05-21 PROCEDURE — 82947 ASSAY GLUCOSE BLOOD QUANT: CPT

## 2025-05-21 PROCEDURE — 78815 PET IMAGE W/CT SKULL-THIGH: CPT

## 2025-05-21 RX ORDER — FLUDEOXYGLUCOSE F 18 200 MCI/ML
10 INJECTION, SOLUTION INTRAVENOUS
Status: COMPLETED | OUTPATIENT
Start: 2025-05-21 | End: 2025-05-21

## 2025-05-21 RX ORDER — SODIUM CHLORIDE 0.9 % (FLUSH) 0.9 %
10 SYRINGE (ML) INJECTION
Status: COMPLETED | OUTPATIENT
Start: 2025-05-21 | End: 2025-05-21

## 2025-05-21 RX ADMIN — SODIUM CHLORIDE, PRESERVATIVE FREE 10 ML: 5 INJECTION INTRAVENOUS at 13:35

## 2025-05-21 RX ADMIN — FLUDEOXYGLUCOSE F 18 12.09 MILLICURIE: 200 INJECTION, SOLUTION INTRAVENOUS at 13:35

## 2025-05-23 ENCOUNTER — TELEPHONE (OUTPATIENT)
Age: 75
End: 2025-05-23

## 2025-05-23 ENCOUNTER — RESULTS FOLLOW-UP (OUTPATIENT)
Dept: PRIMARY CARE CLINIC | Age: 75
End: 2025-05-23

## 2025-05-23 DIAGNOSIS — R94.2 ABNORMAL PET OF LEFT LUNG: ICD-10-CM

## 2025-05-23 DIAGNOSIS — R91.8 ABNORMAL CT SCAN OF LUNG: Primary | ICD-10-CM

## 2025-05-23 NOTE — RESULT ENCOUNTER NOTE
I saw him recently in the office for the first time as new patient.  He was already scheduled for PET scan when he saw me PET scan results are available and I have discussed with him biopsy but he wanted to bring his daughter so please check his appointment I am very sure he must have an appointment to see me back next week or the week after and need to make sure that he will bring the daughter so we can discuss with the daughter about the biopsy and the procedure, please let me when is his appointment

## 2025-05-23 NOTE — TELEPHONE ENCOUNTER
Name: Jacky Olivares  : 1950  MRN: [id number]    Oncology Navigation Follow-Up Note    Contact Type:  Telephone    Notes: Navigator received chart message and pt. Referred to MO   Please schedule MO consult ASAP      Electronically signed by Zeinab Pastrana RN on 2025 at 11:32 AM

## 2025-05-29 ENCOUNTER — TELEPHONE (OUTPATIENT)
Age: 75
End: 2025-05-29

## 2025-05-29 NOTE — TELEPHONE ENCOUNTER
Name: Jacky Olivares  : 1950  MRN: [id number]    Oncology Navigation- Initial Note:    Intake-  Contact Type: Telephone    Continuum of Care: Diagnosis/Active Treatment    Smoking hx:     Notes: Navigator spoke with pt. And encouraging pt. To return call to  CC for consult appt.     Electronically signed by Zeinab Pastrana RN on 2025 at 1:51 PM

## 2025-06-03 ENCOUNTER — TELEPHONE (OUTPATIENT)
Age: 75
End: 2025-06-03

## 2025-06-03 ENCOUNTER — INITIAL CONSULT (OUTPATIENT)
Age: 75
End: 2025-06-03
Payer: COMMERCIAL

## 2025-06-03 VITALS
BODY MASS INDEX: 20.62 KG/M2 | DIASTOLIC BLOOD PRESSURE: 118 MMHG | HEART RATE: 87 BPM | RESPIRATION RATE: 16 BRPM | HEIGHT: 74 IN | WEIGHT: 160.7 LBS | TEMPERATURE: 97.8 F | SYSTOLIC BLOOD PRESSURE: 170 MMHG

## 2025-06-03 DIAGNOSIS — R91.8 MASS OF UPPER LOBE OF RIGHT LUNG: Primary | ICD-10-CM

## 2025-06-03 DIAGNOSIS — Z72.0 TOBACCO ABUSE: ICD-10-CM

## 2025-06-03 DIAGNOSIS — Z71.6 TOBACCO ABUSE COUNSELING: ICD-10-CM

## 2025-06-03 PROCEDURE — 3077F SYST BP >= 140 MM HG: CPT | Performed by: INTERNAL MEDICINE

## 2025-06-03 PROCEDURE — 3080F DIAST BP >= 90 MM HG: CPT | Performed by: INTERNAL MEDICINE

## 2025-06-03 PROCEDURE — 99205 OFFICE O/P NEW HI 60 MIN: CPT | Performed by: INTERNAL MEDICINE

## 2025-06-03 NOTE — PATIENT INSTRUCTIONS
Navigator  A note for work excuse from today  IR for lung biopsy  Virtual visit after biopsy  Refer to radiation oncology

## 2025-06-03 NOTE — TELEPHONE ENCOUNTER
Name: Jacky Olivares  : 1950  MRN: [id number]    Oncology Navigation Follow-Up Note    Contact Type:  Telephone    Notes: Navigator met with pt. Face to face and business card given to pt. Writer briefly explaining role as navigator and will further discuss assistance once pt. Meets with Dr. Holman.   Dr. Holman ordering IR Bx, however knowing node is centrally located ( Pt. may be reluctant to have ION Bronch). Writer encouraging pt. To meet with Dr. Logan for further advice.      Electronically signed by Zeinab Pastrana RN on 6/3/2025 at 10:23 AM

## 2025-06-03 NOTE — TELEPHONE ENCOUNTER
OMKAR HERE FOR CONSULTATION  Navigator  A note for work excuse from today  IR for lung biopsy  Virtual visit after biopsy  Refer to radiation oncology  IR WAS CALLED DR HAS TO REVIEW IT THEY WILL CALL PT FOR AN APPT  RAD/ONC IS ON 6/19/25 @ 1PM IN [facility]  MD VISIT GERARDO PRATT PRINTED W/ INSTRUCTIONS AND GIVEN TO PT ON EXIT

## 2025-06-04 ENCOUNTER — TELEPHONE (OUTPATIENT)
Dept: INTERVENTIONAL RADIOLOGY/VASCULAR | Age: 75
End: 2025-06-04

## 2025-06-04 ENCOUNTER — TELEPHONE (OUTPATIENT)
Age: 75
End: 2025-06-04

## 2025-06-04 ENCOUNTER — OFFICE VISIT (OUTPATIENT)
Dept: PULMONOLOGY | Age: 75
End: 2025-06-04
Payer: COMMERCIAL

## 2025-06-04 VITALS
DIASTOLIC BLOOD PRESSURE: 104 MMHG | HEIGHT: 74 IN | BODY MASS INDEX: 20.53 KG/M2 | OXYGEN SATURATION: 95 % | HEART RATE: 96 BPM | WEIGHT: 160 LBS | SYSTOLIC BLOOD PRESSURE: 130 MMHG

## 2025-06-04 DIAGNOSIS — R91.8 LUNG MASS: Primary | ICD-10-CM

## 2025-06-04 DIAGNOSIS — R06.09 DYSPNEA ON EXERTION: ICD-10-CM

## 2025-06-04 DIAGNOSIS — J44.9 CHRONIC OBSTRUCTIVE PULMONARY DISEASE, UNSPECIFIED COPD TYPE (HCC): ICD-10-CM

## 2025-06-04 DIAGNOSIS — Z87.891 HISTORY OF SMOKING GREATER THAN 50 PACK YEARS: ICD-10-CM

## 2025-06-04 PROCEDURE — 1159F MED LIST DOCD IN RCRD: CPT | Performed by: INTERNAL MEDICINE

## 2025-06-04 PROCEDURE — 3080F DIAST BP >= 90 MM HG: CPT | Performed by: INTERNAL MEDICINE

## 2025-06-04 PROCEDURE — 99214 OFFICE O/P EST MOD 30 MIN: CPT | Performed by: INTERNAL MEDICINE

## 2025-06-04 PROCEDURE — 3075F SYST BP GE 130 - 139MM HG: CPT | Performed by: INTERNAL MEDICINE

## 2025-06-04 PROCEDURE — 1123F ACP DISCUSS/DSCN MKR DOCD: CPT | Performed by: INTERNAL MEDICINE

## 2025-06-04 RX ORDER — FLUTICASONE FUROATE, UMECLIDINIUM BROMIDE AND VILANTEROL TRIFENATATE 100; 62.5; 25 UG/1; UG/1; UG/1
1 POWDER RESPIRATORY (INHALATION) DAILY
Qty: 2 EACH | Refills: 0 | Status: SHIPPED | COMMUNITY
Start: 2025-06-04

## 2025-06-04 NOTE — PROGRESS NOTES
_           Mr. Jacky Olivares is a very pleasant 74 y.o. male with history of multiple co morbidities as listed.  Patient is referred for evaluation further management of right upper lobe lung mass.  Patient has history of advanced COPD.  He is a smoker of 1 to 2 packs/day for 60 years.  He is having persistent symptoms related to COPD including cough and sputum.  No hemoptysis.  No chest pain.  No fever or infections.  Patient had screening CT scan which showed 3 mm right upper lobe nodule.  Follow-up screening CT scan May 6, 2025 showed significant changes with lung nodule measuring 2.5 x 1.5 cm.  Subsequently PET CT scan was done which showed increased uptake in the suspicious nodule.  PET/CT scan showed no evidence of distant metastasis.  Patient was evaluated by pulmonary and was referred to us for evaluation and further management for presumed cancer.  Patient smokes 1 to 2 packs/day for 60 years.  PAST MEDICAL HISTORY: has a past medical history of Arthritis, CAD (coronary artery disease), Headache(784.0), Hyperlipidemia, Hypertension, Lung cancer (HCC), Pneumonia, and Unspecified cerebral artery occlusion with cerebral infarction.    PAST SURGICAL HISTORY: has a past surgical history that includes fracture surgery and Cardiac procedure (N/A, 5/17/2024).     CURRENT MEDICATIONS:  has a current medication list which includes the following prescription(s): amlodipine, albuterol sulfate hfa, budesonide-formoterol, aspirin, acetaminophen, trelegy ellipta, senna-docusate, tiotropium, tizanidine, omeprazole, apixaban, atorvastatin, carvedilol, clopidogrel, albuterol, methocarbamol, vitamin d, and cetirizine.    ALLERGIES:  has no known allergies.    FAMILY HISTORY: Negative for any hematological or oncological conditions.     SOCIAL HISTORY:  reports that he has been smoking cigarettes. He started smoking about 31 years ago.

## 2025-06-04 NOTE — TELEPHONE ENCOUNTER
IR received a request for a lung biopsy.  Dr Dunlap reviewed and said very high risk due to emphysema, small size and adjacent vessels.  Consider bronchoscopic biopsy.

## 2025-06-04 NOTE — PROGRESS NOTES
symptoms. He expresses reluctance to use oxygen therapy. He is currently on albuterol for emergency use.    SOCIAL HISTORY  He admits to smoking close to a pack of cigarettes a day.    MEDICATIONS  Albuterol.      Past Medical History:        Diagnosis Date    Arthritis     CAD (coronary artery disease)     Headache(784.0)     Hyperlipidemia     Hypertension     Lung cancer (HCC) 06/03/2025    Pneumonia     Unspecified cerebral artery occlusion with cerebral infarction        Past Surgical History:        Procedure Laterality Date    CARDIAC PROCEDURE N/A 5/17/2024    Left heart cath / coronary angiography performed by Myke Weathers MD at [facility] CARDIAC CATH LAB    FRACTURE SURGERY         Allergies:    No Known Allergies      Home Meds:   Outpatient Encounter Medications as of 6/4/2025   Medication Sig Dispense Refill    amLODIPine (NORVASC) 10 MG tablet Take 1 tablet by mouth daily 90 tablet 1    acetaminophen (APAP EXTRA STRENGTH) 500 MG tablet Take 2 tablets by mouth every 6 hours as needed for Pain 30 tablet 1    senna-docusate (PERICOLACE) 8.6-50 MG per tablet Take 2 tablets by mouth nightly as needed (Patient not taking: Reported on 6/4/2025)      tiotropium (SPIRIVA HANDIHALER) 18 MCG inhalation capsule Inhale 1 capsule into the lungs daily (Patient not taking: Reported on 6/4/2025) 30 capsule 5    tiZANidine (ZANAFLEX) 4 MG tablet  (Patient not taking: Reported on 6/4/2025)      omeprazole (PRILOSEC) 40 MG delayed release capsule  (Patient not taking: Reported on 6/4/2025)      albuterol sulfate HFA (PROVENTIL;VENTOLIN;PROAIR) 108 (90 Base) MCG/ACT inhaler Inhale 2 puffs into the lungs 4 times daily as needed for Wheezing (Patient not taking: Reported on 6/4/2025) 1 each 5    budesonide-formoterol (SYMBICORT) 160-4.5 MCG/ACT AERO Inhale 2 puffs into the lungs 2 times daily 2 each 5    aspirin 81 MG chewable tablet Take 1 tablet by mouth daily 90 tablet 1    apixaban (ELIQUIS) 5 MG TABS tablet Take 1

## 2025-06-04 NOTE — TELEPHONE ENCOUNTER
WRITER RECEIVED A MESSAGE FROM CHELA RAY SHE STATED THAT DR FOR LUNG BX REVIEWED THE PT AND THEY SUGGESTED ANOTHER BX INFORMATION WAS GIVEN TO [facility] JR ALBRECHT TO RELAY THE MESSAGE TO DR JEFF

## 2025-06-05 ENCOUNTER — HOSPITAL ENCOUNTER (OUTPATIENT)
Dept: PULMONOLOGY | Age: 75
Discharge: HOME | End: 2025-06-05
Attending: INTERNAL MEDICINE
Payer: COMMERCIAL

## 2025-06-05 DIAGNOSIS — J44.9 CHRONIC OBSTRUCTIVE PULMONARY DISEASE, UNSPECIFIED COPD TYPE (HCC): ICD-10-CM

## 2025-06-05 PROBLEM — R91.8 MASS OF UPPER LOBE OF RIGHT LUNG: Status: ACTIVE | Noted: 2025-06-05

## 2025-06-05 PROCEDURE — 94060 EVALUATION OF WHEEZING: CPT

## 2025-06-05 PROCEDURE — 94640 AIRWAY INHALATION TREATMENT: CPT

## 2025-06-05 PROCEDURE — 94664 DEMO&/EVAL PT USE INHALER: CPT

## 2025-06-05 PROCEDURE — 94729 DIFFUSING CAPACITY: CPT

## 2025-06-05 PROCEDURE — 94726 PLETHYSMOGRAPHY LUNG VOLUMES: CPT

## 2025-06-09 ENCOUNTER — TELEPHONE (OUTPATIENT)
Dept: PULMONOLOGY | Age: 75
End: 2025-06-09

## 2025-06-09 ENCOUNTER — TELEPHONE (OUTPATIENT)
Age: 75
End: 2025-06-09

## 2025-06-09 DIAGNOSIS — R91.1 RIGHT UPPER LOBE PULMONARY NODULE: Primary | ICD-10-CM

## 2025-06-09 NOTE — TELEPHONE ENCOUNTER
Name: Jacky Olivares  : 1950  MRN: [id number]    Oncology Navigation Follow-Up Note    Contact Type:  Telephone    Notes: Navigator christian pt. Scheduled for Bronch .  Carmel/Keily pt. Scheduled for RO consult , appt. Will need rescheduled.       Electronically signed by Zeinab Pastrana RN on 2025 at 3:56 PM

## 2025-06-09 NOTE — TELEPHONE ENCOUNTER
Reviewed message from Dr. Logan regarding his recent PET and CT findings.  I called patient and discussed results.  Recommended bronchoscopy with Ion navigation for the right upper lobe nodule.  Discussed risk/benefits and alternatives.  Patient has been scheduled for Ion bronchoscopy on 6/19/2025.  Will see if his current CT will be compatible with \"Ion software\" otherwise will need a separate planning CT for navigation bronchoscopy.  Patient was advised to hold his antiplatelet and anticoagulation prior to the procedure.

## 2025-06-10 NOTE — TELEPHONE ENCOUNTER
Verified time of procedure   Called pt, explained were he needs to report - 12:00 pm scheduled Bronch - pt will need to be there by 10:00 am - mailing prep sheet.

## 2025-06-18 ENCOUNTER — HOSPITAL ENCOUNTER (OUTPATIENT)
Dept: CT IMAGING | Age: 75
Discharge: HOME | End: 2025-06-20
Attending: INTERNAL MEDICINE
Payer: COMMERCIAL

## 2025-06-18 DIAGNOSIS — R91.1 RIGHT UPPER LOBE PULMONARY NODULE: ICD-10-CM

## 2025-06-18 PROCEDURE — 71250 CT THORAX DX C-: CPT

## 2025-06-18 NOTE — PROGRESS NOTES
Spoke with pt for surgical pre-screen.  States that he does not take any medication on a daily basis.  States that he uses \"rescue inhaler\" as needed, usually with exertion.  States that he is unable to afford daily inhaler.  States that he has not taken blood thinners \"in months\".

## 2025-06-19 ENCOUNTER — ANESTHESIA EVENT (OUTPATIENT)
Dept: ENDOSCOPY | Age: 75
End: 2025-06-19
Payer: COMMERCIAL

## 2025-06-19 ENCOUNTER — APPOINTMENT (OUTPATIENT)
Dept: GENERAL RADIOLOGY | Age: 75
End: 2025-06-19
Attending: INTERNAL MEDICINE
Payer: COMMERCIAL

## 2025-06-19 ENCOUNTER — ANESTHESIA (OUTPATIENT)
Dept: ENDOSCOPY | Age: 75
End: 2025-06-19
Payer: COMMERCIAL

## 2025-06-19 ENCOUNTER — HOSPITAL ENCOUNTER (OUTPATIENT)
Age: 75
Setting detail: OUTPATIENT SURGERY
Discharge: HOME | End: 2025-06-19
Attending: INTERNAL MEDICINE | Admitting: INTERNAL MEDICINE
Payer: COMMERCIAL

## 2025-06-19 VITALS
OXYGEN SATURATION: 93 % | RESPIRATION RATE: 19 BRPM | TEMPERATURE: 97.2 F | DIASTOLIC BLOOD PRESSURE: 108 MMHG | SYSTOLIC BLOOD PRESSURE: 158 MMHG | HEART RATE: 84 BPM

## 2025-06-19 DIAGNOSIS — R91.1 RIGHT UPPER LOBE PULMONARY NODULE: ICD-10-CM

## 2025-06-19 LAB
BUN BLD-MCNC: 18 MG/DL (ref 8–26)
EGFR, POC: 64 ML/MIN/1.73M2
GLUCOSE BLD-MCNC: 90 MG/DL (ref 74–100)
POC CREATININE: 1.2 MG/DL (ref 0.51–1.19)

## 2025-06-19 PROCEDURE — 3609155400 HC ADD ON COMPUTER ASSISTED NAVIGATION: Performed by: INTERNAL MEDICINE

## 2025-06-19 PROCEDURE — 82565 ASSAY OF CREATININE: CPT

## 2025-06-19 PROCEDURE — 88333 PATH CONSLTJ SURG CYTO XM 1: CPT

## 2025-06-19 PROCEDURE — 3609011900 HC BRONCHOSCOPY NEEDLE BX TRACHEA MAIN STEM&/BRON: Performed by: INTERNAL MEDICINE

## 2025-06-19 PROCEDURE — 3609011100 HC BRONCHOSCOPY BRUSHINGS: Performed by: INTERNAL MEDICINE

## 2025-06-19 PROCEDURE — 87102 FUNGUS ISOLATION CULTURE: CPT

## 2025-06-19 PROCEDURE — 84520 ASSAY OF UREA NITROGEN: CPT

## 2025-06-19 PROCEDURE — 7100000000 HC PACU RECOVERY - FIRST 15 MIN: Performed by: INTERNAL MEDICINE

## 2025-06-19 PROCEDURE — 3700000000 HC ANESTHESIA ATTENDED CARE: Performed by: INTERNAL MEDICINE

## 2025-06-19 PROCEDURE — 87206 SMEAR FLUORESCENT/ACID STAI: CPT

## 2025-06-19 PROCEDURE — 2709999900 HC NON-CHARGEABLE SUPPLY: Performed by: INTERNAL MEDICINE

## 2025-06-19 PROCEDURE — 3609010800 HC BRONCHOSCOPY ALVEOLAR LAVAGE: Performed by: INTERNAL MEDICINE

## 2025-06-19 PROCEDURE — 88112 CYTOPATH CELL ENHANCE TECH: CPT

## 2025-06-19 PROCEDURE — 2500000003 HC RX 250 WO HCPCS

## 2025-06-19 PROCEDURE — 3603165200 HC BRNCHSC EBUS GUIDED SAMPL 1/2 NODE STATION/STRUX: Performed by: INTERNAL MEDICINE

## 2025-06-19 PROCEDURE — 87205 SMEAR GRAM STAIN: CPT

## 2025-06-19 PROCEDURE — 88172 CYTP DX EVAL FNA 1ST EA SITE: CPT

## 2025-06-19 PROCEDURE — 7100000010 HC PHASE II RECOVERY - FIRST 15 MIN: Performed by: INTERNAL MEDICINE

## 2025-06-19 PROCEDURE — 3700000001 HC ADD 15 MINUTES (ANESTHESIA): Performed by: INTERNAL MEDICINE

## 2025-06-19 PROCEDURE — 88341 IMHCHEM/IMCYTCHM EA ADD ANTB: CPT

## 2025-06-19 PROCEDURE — 88173 CYTOPATH EVAL FNA REPORT: CPT

## 2025-06-19 PROCEDURE — 87116 MYCOBACTERIA CULTURE: CPT

## 2025-06-19 PROCEDURE — 3609011800 HC BRONCHOSCOPY/TRANSBRONCHIAL LUNG BIOPSY: Performed by: INTERNAL MEDICINE

## 2025-06-19 PROCEDURE — 7100000001 HC PACU RECOVERY - ADDTL 15 MIN: Performed by: INTERNAL MEDICINE

## 2025-06-19 PROCEDURE — 88342 IMHCHEM/IMCYTCHM 1ST ANTB: CPT

## 2025-06-19 PROCEDURE — 71045 X-RAY EXAM CHEST 1 VIEW: CPT

## 2025-06-19 PROCEDURE — C1725 CATH, TRANSLUMIN NON-LASER: HCPCS | Performed by: INTERNAL MEDICINE

## 2025-06-19 PROCEDURE — 7100000011 HC PHASE II RECOVERY - ADDTL 15 MIN: Performed by: INTERNAL MEDICINE

## 2025-06-19 PROCEDURE — 93005 ELECTROCARDIOGRAM TRACING: CPT | Performed by: ANESTHESIOLOGY

## 2025-06-19 PROCEDURE — 6360000002 HC RX W HCPCS

## 2025-06-19 PROCEDURE — 6370000000 HC RX 637 (ALT 250 FOR IP): Performed by: ANESTHESIOLOGY

## 2025-06-19 PROCEDURE — 2580000003 HC RX 258

## 2025-06-19 PROCEDURE — 87015 SPECIMEN INFECT AGNT CONCNTJ: CPT

## 2025-06-19 PROCEDURE — 88305 TISSUE EXAM BY PATHOLOGIST: CPT

## 2025-06-19 PROCEDURE — 87070 CULTURE OTHR SPECIMN AEROBIC: CPT

## 2025-06-19 PROCEDURE — 82947 ASSAY GLUCOSE BLOOD QUANT: CPT

## 2025-06-19 PROCEDURE — 2720000010 HC SURG SUPPLY STERILE: Performed by: INTERNAL MEDICINE

## 2025-06-19 RX ORDER — DEXAMETHASONE SODIUM PHOSPHATE 10 MG/ML
INJECTION, SOLUTION INTRA-ARTICULAR; INTRALESIONAL; INTRAMUSCULAR; INTRAVENOUS; SOFT TISSUE
Status: DISCONTINUED | OUTPATIENT
Start: 2025-06-19 | End: 2025-06-19 | Stop reason: SDUPTHER

## 2025-06-19 RX ORDER — FENTANYL CITRATE 50 UG/ML
INJECTION, SOLUTION INTRAMUSCULAR; INTRAVENOUS
Status: DISCONTINUED | OUTPATIENT
Start: 2025-06-19 | End: 2025-06-19 | Stop reason: SDUPTHER

## 2025-06-19 RX ORDER — ROCURONIUM BROMIDE 10 MG/ML
INJECTION, SOLUTION INTRAVENOUS
Status: DISCONTINUED | OUTPATIENT
Start: 2025-06-19 | End: 2025-06-19 | Stop reason: SDUPTHER

## 2025-06-19 RX ORDER — ONDANSETRON 2 MG/ML
INJECTION INTRAMUSCULAR; INTRAVENOUS
Status: DISCONTINUED | OUTPATIENT
Start: 2025-06-19 | End: 2025-06-19 | Stop reason: SDUPTHER

## 2025-06-19 RX ORDER — PROPOFOL 10 MG/ML
INJECTION, EMULSION INTRAVENOUS
Status: DISCONTINUED | OUTPATIENT
Start: 2025-06-19 | End: 2025-06-19 | Stop reason: SDUPTHER

## 2025-06-19 RX ORDER — SODIUM CHLORIDE, SODIUM LACTATE, POTASSIUM CHLORIDE, CALCIUM CHLORIDE 600; 310; 30; 20 MG/100ML; MG/100ML; MG/100ML; MG/100ML
INJECTION, SOLUTION INTRAVENOUS
Status: DISCONTINUED | OUTPATIENT
Start: 2025-06-19 | End: 2025-06-19 | Stop reason: SDUPTHER

## 2025-06-19 RX ORDER — IPRATROPIUM BROMIDE AND ALBUTEROL SULFATE 2.5; .5 MG/3ML; MG/3ML
1 SOLUTION RESPIRATORY (INHALATION) ONCE
Status: COMPLETED | OUTPATIENT
Start: 2025-06-19 | End: 2025-06-19

## 2025-06-19 RX ORDER — PHENYLEPHRINE HCL IN 0.9% NACL 1 MG/10 ML
SYRINGE (ML) INTRAVENOUS
Status: DISCONTINUED | OUTPATIENT
Start: 2025-06-19 | End: 2025-06-19 | Stop reason: SDUPTHER

## 2025-06-19 RX ORDER — LIDOCAINE HYDROCHLORIDE 10 MG/ML
INJECTION, SOLUTION EPIDURAL; INFILTRATION; INTRACAUDAL; PERINEURAL
Status: DISCONTINUED | OUTPATIENT
Start: 2025-06-19 | End: 2025-06-19 | Stop reason: SDUPTHER

## 2025-06-19 RX ADMIN — ROCURONIUM BROMIDE 20 MG: 10 INJECTION, SOLUTION INTRAVENOUS at 14:15

## 2025-06-19 RX ADMIN — Medication 150 MCG: at 13:50

## 2025-06-19 RX ADMIN — FENTANYL CITRATE 50 MCG: 50 INJECTION, SOLUTION INTRAMUSCULAR; INTRAVENOUS at 13:15

## 2025-06-19 RX ADMIN — SODIUM CHLORIDE, POTASSIUM CHLORIDE, SODIUM LACTATE AND CALCIUM CHLORIDE: 600; 310; 30; 20 INJECTION, SOLUTION INTRAVENOUS at 13:06

## 2025-06-19 RX ADMIN — PROPOFOL 125 MCG/KG/MIN: 10 INJECTION, EMULSION INTRAVENOUS at 13:19

## 2025-06-19 RX ADMIN — LIDOCAINE HYDROCHLORIDE 50 MG: 10 INJECTION, SOLUTION EPIDURAL; INFILTRATION; INTRACAUDAL; PERINEURAL at 13:15

## 2025-06-19 RX ADMIN — ONDANSETRON 4 MG: 2 INJECTION INTRAMUSCULAR; INTRAVENOUS at 14:35

## 2025-06-19 RX ADMIN — ROCURONIUM BROMIDE 50 MG: 10 INJECTION, SOLUTION INTRAVENOUS at 13:15

## 2025-06-19 RX ADMIN — SODIUM CHLORIDE, POTASSIUM CHLORIDE, SODIUM LACTATE AND CALCIUM CHLORIDE: 600; 310; 30; 20 INJECTION, SOLUTION INTRAVENOUS at 14:35

## 2025-06-19 RX ADMIN — Medication 150 MCG: at 14:20

## 2025-06-19 RX ADMIN — Medication 100 MCG: at 13:34

## 2025-06-19 RX ADMIN — IPRATROPIUM BROMIDE AND ALBUTEROL SULFATE 1 DOSE: .5; 2.5 SOLUTION RESPIRATORY (INHALATION) at 12:11

## 2025-06-19 RX ADMIN — FENTANYL CITRATE 50 MCG: 50 INJECTION, SOLUTION INTRAMUSCULAR; INTRAVENOUS at 14:25

## 2025-06-19 RX ADMIN — DEXAMETHASONE SODIUM PHOSPHATE 10 MG: 10 INJECTION INTRAMUSCULAR; INTRAVENOUS at 13:33

## 2025-06-19 RX ADMIN — SUGAMMADEX 300 MG: 100 INJECTION, SOLUTION INTRAVENOUS at 14:37

## 2025-06-19 RX ADMIN — PROPOFOL 150 MG: 10 INJECTION, EMULSION INTRAVENOUS at 13:15

## 2025-06-19 ASSESSMENT — PAIN SCALES - GENERAL: PAINLEVEL_OUTOF10: 0

## 2025-06-19 ASSESSMENT — PAIN - FUNCTIONAL ASSESSMENT: PAIN_FUNCTIONAL_ASSESSMENT: 0-10

## 2025-06-19 NOTE — FLOWSHEET NOTE
Patient states daughter is POA.   Samara Infante [phone]  Family will bring copy of POA to next [facility] visit to have on file in system.

## 2025-06-19 NOTE — DISCHARGE INSTRUCTIONS
Patient may have small amount of hemoptysis (blood in sputum) and low grade fever for 1-2 days  Call clinic if hemoptysis worsens or fails to resolve.  Patient will be receive call regarding pathology results from Ozzie Salazar MD  Follow up appointment at the pulmonary clinic will be arranged.

## 2025-06-19 NOTE — ANESTHESIA PRE PROCEDURE
\"POCHEMO\", \"POCHCT\" in the last 72 hours.    Coags:   Lab Results   Component Value Date/Time    PROTIME 14.2 12/17/2024 08:41 PM    INR 1.1 12/17/2024 08:41 PM    APTT 28.5 12/17/2024 08:41 PM       HCG (If Applicable): No results found for: \"PREGTESTUR\", \"PREGSERUM\", \"HCG\", \"HCGQUANT\"     ABGs: No results found for: \"PHART\", \"PO2ART\", \"SWV2FWB\", \"IKS3JLI\", \"BEART\", \"C7IGIPIG\"     Type & Screen (If Applicable):  No results found for: \"ABORH\", \"LABANTI\"    Drug/Infectious Status (If Applicable):  Lab Results   Component Value Date/Time    HEPCAB NONREACTIVE 02/28/2024 12:15 PM       COVID-19 Screening (If Applicable):   No results found for: \"COVID19\"        EKG  12/2024  Sinus tachycardia with Premature supraventricular complexes  Left axis deviation  Septal infarct , age undetermined  Abnormal ECG  When compared with ECG of 18-MAY-2024 09:25,  Premature supraventricular complexes are now Present        Anesthesia Evaluation  Patient summary reviewed  Airway: Mallampati: III  TM distance: >3 FB   Neck ROM: full  Mouth opening: > = 3 FB   Dental:    (+) other      Pulmonary:   (+) pneumonia:  COPD:      decreased breath sounds                               Cardiovascular:    (+) hypertension:, past MI:, CAD:                  Neuro/Psych:   (+) CVA:, neuromuscular disease:, headaches:            GI/Hepatic/Renal:   (+) GERD:          Endo/Other:    (+) malignancy/cancer.                  ROS comment: Lung cancer Abdominal: normal exam            Vascular:   + PVD, aortic or cerebral.       Other Findings:             Anesthesia Plan      general     ASA 3       Induction: intravenous.    MIPS: Postoperative opioids intended.  Anesthetic plan and risks discussed with patient.      Plan discussed with CRNA.                    Orlando Lewis MD   6/19/2025

## 2025-06-19 NOTE — H&P
History and Physical    Pt Name: Jacky Olivares  MRN: [id number]  YOB: 1950  Date of evaluation: 6/19/2025  Primary Care Physician: Jenise Song APRN - CNP    SUBJECTIVE:   History of Chief Complaint:    Jacky Olivares is a 74 y.o. male who is scheduled today for ION NAVIGATIONAL BRONCHOSCOPY, EBUS, TBNA, PATHOLOGY. Patient reports he has a lung mass that has grown in size since last year. He reports dyspnea on exertion. Patient reports he is a limited historian. History obtained from available records in epic. He tells me he uses inhaler as needed only. He reports he is a 1 ppd smoker.   Allergies  has no known allergies.  Medications  Prior to Admission medications    Medication Sig Start Date End Date Taking? Authorizing Provider   albuterol sulfate HFA (PROVENTIL;VENTOLIN;PROAIR) 108 (90 Base) MCG/ACT inhaler Inhale 2 puffs into the lungs 4 times daily as needed for Wheezing 12/2/24  Yes Jenise Song APRN - CNP   albuterol (PROVENTIL) (2.5 MG/3ML) 0.083% nebulizer solution Take 3 mLs by nebulization every 6 hours as needed for Wheezing 6/12/24  Yes Jenise Song APRN - CNP   acetaminophen (APAP EXTRA STRENGTH) 500 MG tablet Take 2 tablets by mouth every 6 hours as needed for Pain 1/31/19  Yes Ana Chavez, DO   fluticasone-umeclidin-vilant (TRELEGY ELLIPTA) 100-62.5-25 MCG/ACT AEPB inhaler Inhale 1 puff into the lungs daily  Patient not taking: Reported on 6/19/2025 6/4/25   Edgardo Logan MD   senna-docusate (PERICOLACE) 8.6-50 MG per tablet Take 2 tablets by mouth nightly as needed  Patient not taking: Reported on 6/4/2025 12/27/24   ProviderAlanis MD   tiotropium (SPIRIVA HANDIHALER) 18 MCG inhalation capsule Inhale 1 capsule into the lungs daily  Patient not taking: Reported on 6/3/2025 1/16/25   Jenise Song APRN - CNP   amLODIPine (NORVASC) 10 MG tablet Take 1 tablet by mouth daily 12/16/24 6/14/25  Jenise Song APRN - CNP   tiZANidine (ZANAFLEX) 4 MG tablet     Provider, MD Alanis  and Cardiac procedure (N/A, 5/17/2024).  Social History   reports that he has been smoking cigarettes. He started smoking about 31 years ago. He has a 31.5 pack-year smoking history. He has never used smokeless tobacco.   reports current alcohol use of about 1.0 standard drink of alcohol per week.   reports current drug use. Drug: Marijuana (Weed).    Family History  Family Status   Relation Name Status    Mother  (Not Specified)    Brother  (Not Specified)    MGF  (Not Specified)   No partnership data on file     family history includes Cancer in his brother, maternal grandfather, and mother; Other in his mother.  Review of Systems:  CONSTITUTIONAL:   negative for fevers, chills, fatigue and malaise   EYES:   negative for double vision, blurred vision and photophobia    HEENT:   negative for tinnitus, epistaxis and sore throat     RESPIRATORY:   +per HPI   CARDIOVASCULAR:   negative for chest pain, palpitations, syncope, edema     GASTROINTESTINAL:   negative for nausea, vomiting  +decreased appetite   GENITOURINARY:   negative for incontinence     MUSCULOSKELETAL:   negative for neck or back pain     NEUROLOGICAL:   Negative for weakness and tingling  negative for headaches and dizziness     PSYCHIATRIC:   negative for anxiety       OBJECTIVE:   VITALS:  temporal temperature is 97.3 °F (36.3 °C). His blood pressure is 164/104 (abnormal) and his pulse is 84. His respiration is 20 and oxygen saturation is 92%.   CONSTITUTIONAL:alert & oriented x 3, no acute distress. Calm and pleasant. Limited historian.   SKIN:  Warm and dry, no rashes on exposed areas of skin   HEAD:  Normocephalic, atraumatic   EYES: PERRL.  EOMs intact.  EARS:  Equal bilaterally, no edema or thickening, skin is intact without lumps or lesions. No discharge. Hearing grossly WNL.    NOSE:  Nares patent.  No rhinorrhea   MOUTH/THROAT:  Mucous membranes moist, tongue is pink  NECK:Full ROM  LUNGS: Respirations even and non-labored. Clear to

## 2025-06-20 LAB
CASE NUMBER:: NORMAL
CASE NUMBER:: NORMAL
EKG ATRIAL RATE: 80 BPM
EKG P AXIS: 75 DEGREES
EKG P-R INTERVAL: 194 MS
EKG Q-T INTERVAL: 396 MS
EKG QRS DURATION: 96 MS
EKG QTC CALCULATION (BAZETT): 456 MS
EKG R AXIS: -73 DEGREES
EKG T AXIS: 66 DEGREES
EKG VENTRICULAR RATE: 80 BPM
MICROORGANISM SPEC CULT: NORMAL
MICROORGANISM SPEC CULT: NORMAL
MICROORGANISM/AGENT SPEC: NORMAL
MICROORGANISM/AGENT SPEC: NORMAL
SERVICE CMNT-IMP: NORMAL
SERVICE CMNT-IMP: NORMAL
SPECIMEN DESCRIPTION: NORMAL
SPECIMEN DESCRIPTION: NORMAL

## 2025-06-20 NOTE — ANESTHESIA POSTPROCEDURE EVALUATION
Department of Anesthesiology  Postprocedure Note    Patient: Jacky Olivares  MRN: [id number]  YOB: 1950  Date of evaluation: 6/20/2025    Procedure Summary       Date: 06/19/25 Room / Location: [place] [address] / [facility]    Anesthesia Start: 1306 Anesthesia Stop: 1450    Procedures:       BRONCHOSCOPY COMPUTER ASSISTED ROBOTIC      BRONCHOSCOPY/TRANSBRONCHIAL NEEDLE BIOPSY ROBOTIC      BRONCHOSCOPY/TRANSBRONCHIAL LUNG BIOPSY ROBOTIC      BRONCHOSCOPY BRUSHINGS ROBOTIC      BRONCHOSCOPY ENDOBRONCHIAL ULTRASOUND ROBOTIC      BRONCHOSCOPY ALVEOLAR LAVAGE ROBOTIC Diagnosis:       Right upper lobe pulmonary nodule      (Right upper lobe pulmonary nodule [R91.1])    Surgeons: Ozzie Salazar MD Responsible Provider: Rowdy Betts MD    Anesthesia Type: general ASA Status: 3            Anesthesia Type: No value filed.    Jaylan Phase I: Jaylan Score: 10    Jaylan Phase II: Jaylan Score: 10    Anesthesia Post Evaluation    Patient location during evaluation: PACU  Patient participation: complete - patient participated  Level of consciousness: awake  Airway patency: patent  Nausea & Vomiting: no nausea and no vomiting  Cardiovascular status: blood pressure returned to baseline  Respiratory status: acceptable  Hydration status: euvolemic  Comments: BP (!) 158/108   Pulse 84   Temp 97.2 °F (36.2 °C) (Temporal)   Resp 19   SpO2 93%   Pain Assessment: None - Denies Pain Pain Level: 0        No notable events documented.

## 2025-06-20 NOTE — OP NOTE
FLEXIBLE BRONCHOSCOPY PROCEDURE NOTE     Patient: Jacky Olivares  YOB: 1950  MRN: [id number]  Date of Procedure: 6/19/2025    Pre-Op Diagnosis:Pre-Op Diagnosis Codes:      * Right upper lobe pulmonary nodule [R91.1]   Post-Op Diagnosis:Post-Op Diagnosis Codes:     * Right upper lobe pulmonary nodule [R91.1]     Procedure(s):  BRONCHOSCOPY COMPUTER ASSISTED ROBOTIC  BRONCHOSCOPY/TRANSBRONCHIAL NEEDLE BIOPSY ROBOTIC  BRONCHOSCOPY/TRANSBRONCHIAL LUNG BIOPSY ROBOTIC  BRONCHOSCOPY BRUSHINGS ROBOTIC  BRONCHOSCOPY ENDOBRONCHIAL ULTRASOUND ROBOTIC  BRONCHOSCOPY ALVEOLAR LAVAGE ROBOTIC     Surgeons:   Ozzie Salazar MD    Description of procedure:      [x] Fiberoptic Bronchoscopy   [x] Inspection  [] Bronchial washing   [] Bronchoalveolar lavage   [] Bronchial brushing   [] Transbronchial biopsy   [] Endobronchial biopsy  [] Fluoroscopic guidance       [x] Endobronchial ultrasound-guided (EBUS)/Transbronchial needle aspiration (TBNA)  [] Pretracheal node   [] Subcarinal node   [] Right hilar node   [] Left hilar node   [] AP window node   [] Mass     [x] ION Robotic Navigation Bronchoscopy  [x] Computer assisted planning and navigation  [x] Bronchoalveolar lavage   [x] Bronchial brushing   [x] Transbronchial needle aspiration  [x] Transbronchial biopsy   [] Endobronchial biopsy  [x] Fluoroscopic guidance     [x] Radial EBUS guidance    [] Interventional procedure  [] Fluoroscopy   [] Balloon dilatation   [] Stent placement   [] Argon plasma coagulation   [] Nd:YAG laser    Indication for Bronchoscopy:   [x] Nodule(s)    [] Atelectasis  [] Hemoptysis  [] Pneumonia/Pulmonary infiltrate  [] Respiratory failure/Hypoxemia  [] Airway Stenosis/Obstruction  [] Endotracheal mass/obstruction  [] Endobronchial mass/obstruction  [x] Mediastinal/Hilar Adenopathy  [] Immunocompromised host   [] Lung cancer  [] Metastatic disease.  [] Lung transplant diagnostic/surveillance  [] Bronchiectasis.    [] Therapeutic, to clear airway  curvilinear Endobronchial Ultrasound (EBUS) probe  [x] Transbronchial Needle Aspiration (TBNA) specimens were NOT obtained due to lack of appropriate targets on EBUS evaluation      Robotic Navigational Bronchoscopy:  [x]  Robotic bronchoscopy protocol CT chest obtained and preoperative planning was performed creating pathways to the right upper lobe nodule  [x]  ION Robotic bronchoscope scope was introduced through the endotracheal tube  [x]  Successfully completed registration in ION platform with good correlation between robotic mapping and bronchoscopic imaging  [x]  With the computer-assisted fused navigation, scope was advanced all the way to the right upper lobe nodule/lesion  [x]  Once the tip of the working channel sat within/near the nodule/lesion, positioning was confirmed by with fluoroscopy  [x]  After anchoring in the most favorable positioning to obtain biopsies, positioning was re-confirmed with radial probe endobronchial ultrasonography (R-EBUS)  [x] Under fluoroscopic guidance, a size 21G endobronchial needle was used to obtain         [x]  7 sequential passes to obtain transbronchial needle aspiration (TBNA) specimen        [x]  5 transbronchial biopsies using the biopsy forceps        [x]  bronchial brushing        [x] mini BAL (20 mL)         Specimen Obtained:  ID Type Source Tests Collected by Time Destination   A : Right upper Lobe lung nodule TBNA Tissue Lung CYTOLOGY, NON-GYN Ozzie Salazar MD 6/19/2025 1336    B : Right upper lobe lung nodule transbronchial bx Tissue Lung SURGICAL PATHOLOGY Ozzie Salazar MD 6/19/2025 1337    C : Right upper lobe lung nodule brushing Swab Lung CYTOLOGY, NON-GYN (Canceled) Ozzie Salazar MD 6/19/2025 1338    D : Right upper lobe lung nodule BAL Body Fluid Lung CYTOLOGY, NON-GYN, CULTURE, FUNGUS, FUNGAL STAIN (Canceled), CULTURE WITH SMEAR, ACID FAST BACILLIUS, CULTURE, RESPIRATORY (WITH GRAM STAIN) Ozzie Salazar MD 6/19/2025 1341    E : right upper lobe

## 2025-06-21 LAB
MICROORGANISM SPEC CULT: NO GROWTH
MICROORGANISM/AGENT SPEC: NORMAL
MICROORGANISM/AGENT SPEC: NORMAL
SERVICE CMNT-IMP: NORMAL
SPECIMEN DESCRIPTION: NORMAL

## 2025-06-23 LAB
MICROORGANISM SPEC CULT: NORMAL
MICROORGANISM/AGENT SPEC: NORMAL
SERVICE CMNT-IMP: NORMAL
SPECIMEN DESCRIPTION: NORMAL

## 2025-06-24 ENCOUNTER — RESULTS FOLLOW-UP (OUTPATIENT)
Dept: PULMONOLOGY | Age: 75
End: 2025-06-24

## 2025-06-24 ENCOUNTER — TELEPHONE (OUTPATIENT)
Age: 75
End: 2025-06-24

## 2025-06-24 DIAGNOSIS — C34.11 MALIGNANT NEOPLASM OF UPPER LOBE OF RIGHT LUNG (HCC): Primary | ICD-10-CM

## 2025-06-24 LAB
SURGICAL PATHOLOGY REPORT: NORMAL
SURGICAL PATHOLOGY REPORT: NORMAL

## 2025-06-24 NOTE — TELEPHONE ENCOUNTER
Name: Jacky Olivares  : 1950  MRN: [id number]    Oncology Navigation Follow-Up Note    Contact Type:  Telephone    Notes: Navigator reviewing chart and pathology final.  Please schedule pt. For VV with MO to review biopsy results.       Electronically signed by Zeinab Pastrana RN on 2025 at 3:06 PM

## 2025-06-25 ENCOUNTER — TELEPHONE (OUTPATIENT)
Dept: VASCULAR SURGERY | Age: 75
End: 2025-06-25

## 2025-06-25 ENCOUNTER — TELEPHONE (OUTPATIENT)
Age: 75
End: 2025-06-25

## 2025-06-25 NOTE — TELEPHONE ENCOUNTER
WRITER CALLED PT TO SET UP VV TO GO OVER BX RESULTS. PT STATED HIS DAUGHTER MAKES ALL HIS APPOINTMENTS & HE IS GOING TO HAVE HER CALL OUT OFFICE AT SOME POINT TODAY.

## 2025-06-26 ENCOUNTER — HOSPITAL ENCOUNTER (OUTPATIENT)
Dept: RADIATION ONCOLOGY | Age: 75
Discharge: HOME | End: 2025-06-26
Payer: COMMERCIAL

## 2025-06-26 ENCOUNTER — TELEPHONE (OUTPATIENT)
Age: 75
End: 2025-06-26

## 2025-06-26 VITALS
TEMPERATURE: 97.9 F | DIASTOLIC BLOOD PRESSURE: 101 MMHG | HEART RATE: 105 BPM | RESPIRATION RATE: 16 BRPM | SYSTOLIC BLOOD PRESSURE: 152 MMHG

## 2025-06-26 PROCEDURE — 99213 OFFICE O/P EST LOW 20 MIN: CPT | Performed by: RADIOLOGY

## 2025-06-26 NOTE — CONSULTS
[facility]            Radiation Oncology          [address] [place] [address]        O: [phone]        F: [phone]       [facility]             2025    Patient: Jacky Olivares   YOB: 1950       Dear Dr Wilkinson:    Thank you for referring Jacky Olivares to me for evaluation. Below are the relevant portions of my assessment and plan of care. If you have questions, please do not hesitate to call me. I look forward to following this patient along with you.     Sincerely,    Electronically signed by Jones Huynh MD on 2025 at 8:13 AM    CC: Patient Care Team:  Jenise Song APRN - CNP as PCP - General (Certified Nurse Practitioner)  Jenise Song APRN - CNP as PCP - Empaneled Provider  Zeinab Pastrana RN as Nurse Navigator (Oncology)  ------------------------------------------------------------------------------------------------------------------------------------------------------------------------------------------      RADIATION ONCOLOGY NEW PATIENT VISIT:    Date of Service: 2025    Location:  [facility] Radiation Oncology,   [address] [place] [address], [place] [id number] [phone]     Patient ID:   Jacky Olivares  : 1950   MRN: [id number]    CHIEF COMPLAINT: \"I have lung cancer\"    DIAGNOSIS:  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 male with history of tobacco usage, had a CT lung screening which demonstrated a 2.5 cm right upper lobe cavitary nodule suspicious for neoplastic process, FDG PET scan was completed and showed FDG avid lesion in the right upper lobe without evidence of regional or distant metastatic disease.  He was seen by primary and underwent a biopsy of the right upper lobe lesion which was positive for squamous cell carcinoma.  He was seen by pulmonary and medical [place] and referred for consideration of

## 2025-06-26 NOTE — TELEPHONE ENCOUNTER
Name: Jacky Olivares  : 1950  MRN: [id number]    Oncology Navigation- Initial Note:    Intake-  Contact Type: Telephone    Continuum of Care: Diagnosis/Active Treatment    Smoking hx:     Notes: Navigator spoke with daughter in regards to missed appt. Today and pt. Post RO appt. Went to work. Daughter forgot to call and cancel todays appt.   Pt. Now rescheduled for 7/10 and daughter aware.     Electronically signed by Zeinab Pastrana RN on 2025 at 3:58 PM

## 2025-06-26 NOTE — PROGRESS NOTES
6/4/2025)      omeprazole (PRILOSEC) 40 MG delayed release capsule  (Patient not taking: Reported on 6/4/2025)      albuterol sulfate HFA (PROVENTIL;VENTOLIN;PROAIR) 108 (90 Base) MCG/ACT inhaler Inhale 2 puffs into the lungs 4 times daily as needed for Wheezing 1 each 5    budesonide-formoterol (SYMBICORT) 160-4.5 MCG/ACT AERO Inhale 2 puffs into the lungs 2 times daily 2 each 5    aspirin 81 MG chewable tablet Take 1 tablet by mouth daily 90 tablet 1    apixaban (ELIQUIS) 5 MG TABS tablet Take 1 tablet by mouth 2 times daily (Patient not taking: Reported on 6/3/2025) 180 tablet 1    atorvastatin (LIPITOR) 40 MG tablet Take 1 tablet by mouth nightly (Patient not taking: Reported on 6/3/2025) 90 tablet 1    carvedilol (COREG) 3.125 MG tablet Take 1 tablet by mouth 2 times daily (with meals) (Patient not taking: Reported on 6/3/2025) 180 tablet 1    clopidogrel (PLAVIX) 75 MG tablet Take 1 tablet by mouth daily 90 tablet 1    albuterol (PROVENTIL) (2.5 MG/3ML) 0.083% nebulizer solution Take 3 mLs by nebulization every 6 hours as needed for Wheezing 120 mL 3    methocarbamol (ROBAXIN) 500 MG tablet Take 1 tablet by mouth 4 times daily (Patient not taking: Reported on 6/4/2025)      Cholecalciferol (VITAMIN D) 50 MCG (2000 UT) CAPS capsule Take 1 capsule by mouth daily (Patient not taking: Reported on 6/4/2025)      cetirizine (ZYRTEC) 10 MG tablet Take 1 tablet by mouth daily (Patient not taking: Reported on 6/4/2025) 90 tablet 1    acetaminophen (APAP EXTRA STRENGTH) 500 MG tablet Take 2 tablets by mouth every 6 hours as needed for Pain 30 tablet 1     No current facility-administered medications for this encounter.       Past Medical History:   Diagnosis Date    GORAN (acute kidney injury) 12/2024    With pyelonephritis    Arthritis     CAD (coronary artery disease) 05/2024    COPD (chronic obstructive pulmonary disease) (HCC)     GERD (gastroesophageal reflux disease) 02/2024    Headache(784.0)     History of

## 2025-06-27 ENCOUNTER — TELEPHONE (OUTPATIENT)
Dept: CARDIOTHORACIC SURGERY | Age: 75
End: 2025-06-27

## 2025-06-27 NOTE — TELEPHONE ENCOUNTER
Patient called back in regards to this, the writer explained that this appointment would be with the Cardiothoracic Surgeon patient got really confused and wants his daughter to call us back, the writer expressed understanding

## 2025-06-30 LAB
MICROORGANISM SPEC CULT: NORMAL
MICROORGANISM SPEC CULT: NORMAL
MICROORGANISM/AGENT SPEC: NORMAL
MICROORGANISM/AGENT SPEC: NORMAL
SERVICE CMNT-IMP: NORMAL
SERVICE CMNT-IMP: NORMAL
SPECIMEN DESCRIPTION: NORMAL
SPECIMEN DESCRIPTION: NORMAL

## 2025-07-08 ENCOUNTER — HOSPITAL ENCOUNTER (OUTPATIENT)
Dept: RADIATION ONCOLOGY | Age: 75
Discharge: HOME | End: 2025-07-08
Payer: COMMERCIAL

## 2025-07-08 PROCEDURE — 77334 RADIATION TREATMENT AID(S): CPT | Performed by: RADIOLOGY

## 2025-07-08 NOTE — PROGRESS NOTES
Radiation Treatment Site/Plan/Fractions: 5 fx's      Concurrent Chemotherapy/Immunotherapy: No      Cardiac Device: No      Transportation Concerns: No      Nursing Referrals and Reasons: N/A, has navigator      Contrast Given: NO          Miscellaneous Information: Writer provided education and s/e information on radiation to chest. Pt denies having any questions and verbalizes understanding of all information.       
area.  Be gentle when you shower or take a bath. You can take a lukewarm shower every day. If you prefer to take a lukewarm bath, so do only every other day and don't soak  For too long. Whether you take a shower or bath, make sure to use a mild soap. Dry yourself with a soft towel by patting, not rubbing, your skin.   Use only those lotions and skin products that your doctor or nurse suggests. If you are using a prescribed cream for a skin problem or acne, tell your doctor or nurse before you begin radiation treatment. Check with your doctor or nurse before using any of the following skin products:  Bubble bath     Cornstarch  Cream  Deodorant  Hair removers  Makeup  Oil   Ointment  Perfume  Powder  Soap   Sunscreen  Cool, humid places. Your skin may feel much better when you are in a cool, humid places. You can make rooms more humid by putting a bowl of water on the radiator or using a humidifier. If you use a humidifier, be sure to follow the directions about cleaning it to prevent bacteria.   Soft fabrics. Wear clothes and use bed sheets that are made of very soft fabrics.   Do not wear clothes in your treatment area that are tight and do not breathe, such as girdles, body shapers, and pantyhose  Protect your skin from the sun every day. The sun can burn you even on cloudy days or when you are outside for just a few minutes. Do not go to the beach or sunbathe. Wear a broad-brimmed hat, long-sleeved shirt, and long pants when you are outside. Talk with your doctor or nurse about sunscreen lotions. He or she may suggest that you use a sunscreen with an SPF of 30 or higher. You will need to protect your skin form the sun even after radiation therapy is over.  Do not use tanning beds. Tanning beds expose you to the same harmful effects as the sun.  Adhesive tape. Do not put adhesive bandages or other types of sticky tape on your skin in the treatment area. Talk with your doctor or nurse about ways to bandage without

## 2025-07-08 NOTE — DISCHARGE INSTRUCTIONS
Chest Side Effects  Fatigue  Skin Changes  Throat changes, such as trouble swallowing  Cough  Shortness of breath    Ways to Manage Fatigue    Try to sleep at least 8 hours each night. This may be more sleep than you needed before radiation therapy. One way to sleep better at night is to be active during the day. Another way is to relax right before going to bed. Do calming activities before bedtime, such as reading, working on a [employer] puzzle, or listening to music.  Plan time to rest. Take short naps or rest breaks between activities.  Try not to do too much. With fatigue, you may not have enough energy to do all the things you want to do. Stay active, but choose the activities that are most important to you. Try to let go of things that don't matter as much now. For example, you might go to work but not do housework. You might watch your children's sports events but not cook dinner.  Exercise. Research shows that most people feel better when they get some exercise each day. Go for a short walk, ride a bike, or do yoga. Talk with your doctor or nurse about types of exercise you can do while having radiation therapy.  Relax. Mediatation, prayer, gentle yoga, guided imagery, and visualization are ways you can learn to relax and decrease stress.    For relaxation exercises:  Visit Learning to Relax on the National [employer] [place]'s website at : www.cancer.gov/about-cancer/copingfeelings/relaxation  See Facing Forward: Life After Cancer Treatment at: www.cancer.gov/publications/patient-education/facing-forward  Eat and drink well. It can be easier to eat if you have 5 or 6 small meals each day, rather than 3 large ones. Keep foods around that are easy to fix, such as canned soups, frozen meals, yogurt, and cottage cheese. Drink plenty of fluids each day-about 8 cups of water or juice.  Plan a work schedule that is right for you.Fatigue may affect the amount of energy you have for your job. You may feel well enough to

## 2025-07-09 ENCOUNTER — TELEPHONE (OUTPATIENT)
Age: 75
End: 2025-07-09

## 2025-07-09 ENCOUNTER — HOSPITAL ENCOUNTER (OUTPATIENT)
Dept: RADIATION ONCOLOGY | Age: 75
Discharge: HOME | End: 2025-07-09
Payer: COMMERCIAL

## 2025-07-09 PROCEDURE — 77300 RADIATION THERAPY DOSE PLAN: CPT | Performed by: RADIOLOGY

## 2025-07-09 PROCEDURE — 77338 DESIGN MLC DEVICE FOR IMRT: CPT | Performed by: RADIOLOGY

## 2025-07-09 PROCEDURE — 77301 RADIOTHERAPY DOSE PLAN IMRT: CPT | Performed by: RADIOLOGY

## 2025-07-09 PROCEDURE — 77293 RESPIRATOR MOTION MGMT SIMUL: CPT | Performed by: RADIOLOGY

## 2025-07-09 NOTE — TELEPHONE ENCOUNTER
Name: Jacky Olivares  : 1950  MRN: [id number]    Oncology Navigation Follow-Up Note    Contact Type:  Telephone    Notes: Navigator received call from pt. And wanting to cancel MO F/U tomorrow due to plan set with RO to move forward with SBRT and pt. C/o co-pays due to fixed income. Writer will confirm with Dr. Holman when he would like pt. To F/U?   Sravanthi, Please assist pt. With "[employer] FA application, call daughter Samara for documents needed. Pt. On fixed income and co-pay charges building up.      Electronically signed by Zeinab Pastrana RN on 2025 at 3:28 PM

## 2025-07-10 ENCOUNTER — HOSPITAL ENCOUNTER (OUTPATIENT)
Facility: MEDICAL CENTER | Age: 75
End: 2025-07-10

## 2025-07-16 ENCOUNTER — HOSPITAL ENCOUNTER (OUTPATIENT)
Dept: RADIATION ONCOLOGY | Age: 75
Discharge: HOME | End: 2025-07-16
Payer: COMMERCIAL

## 2025-07-16 ENCOUNTER — OFFICE VISIT (OUTPATIENT)
Dept: PRIMARY CARE CLINIC | Age: 75
End: 2025-07-16
Payer: COMMERCIAL

## 2025-07-16 VITALS
DIASTOLIC BLOOD PRESSURE: 95 MMHG | SYSTOLIC BLOOD PRESSURE: 140 MMHG | HEIGHT: 74 IN | BODY MASS INDEX: 21.07 KG/M2 | WEIGHT: 164.2 LBS | OXYGEN SATURATION: 97 % | HEART RATE: 110 BPM

## 2025-07-16 DIAGNOSIS — I25.10 CORONARY ARTERY DISEASE INVOLVING NATIVE HEART WITHOUT ANGINA PECTORIS, UNSPECIFIED VESSEL OR LESION TYPE: ICD-10-CM

## 2025-07-16 DIAGNOSIS — J41.0 SIMPLE CHRONIC BRONCHITIS (HCC): ICD-10-CM

## 2025-07-16 DIAGNOSIS — Z00.00 WELCOME TO MEDICARE PREVENTIVE VISIT: Primary | ICD-10-CM

## 2025-07-16 DIAGNOSIS — C34.90 SQUAMOUS CELL CARCINOMA OF LUNG, UNSPECIFIED LATERALITY (HCC): ICD-10-CM

## 2025-07-16 DIAGNOSIS — I10 PRIMARY HYPERTENSION: ICD-10-CM

## 2025-07-16 DIAGNOSIS — E55.9 VITAMIN D DEFICIENCY: ICD-10-CM

## 2025-07-16 PROBLEM — Z86.711 HISTORY OF PULMONARY EMBOLISM: Status: ACTIVE | Noted: 2024-05-16

## 2025-07-16 PROCEDURE — 3080F DIAST BP >= 90 MM HG: CPT | Performed by: NURSE PRACTITIONER

## 2025-07-16 PROCEDURE — 1123F ACP DISCUSS/DSCN MKR DOCD: CPT | Performed by: NURSE PRACTITIONER

## 2025-07-16 PROCEDURE — 77373 STRTCTC BDY RAD THER TX DLVR: CPT | Performed by: RADIOLOGY

## 2025-07-16 PROCEDURE — G0402 INITIAL PREVENTIVE EXAM: HCPCS | Performed by: NURSE PRACTITIONER

## 2025-07-16 PROCEDURE — 77336 RADIATION PHYSICS CONSULT: CPT | Performed by: RADIOLOGY

## 2025-07-16 PROCEDURE — 3077F SYST BP >= 140 MM HG: CPT | Performed by: NURSE PRACTITIONER

## 2025-07-16 PROCEDURE — 1159F MED LIST DOCD IN RCRD: CPT | Performed by: NURSE PRACTITIONER

## 2025-07-16 RX ORDER — ASPIRIN 81 MG/1
81 TABLET, CHEWABLE ORAL DAILY
Qty: 90 TABLET | Refills: 1 | Status: SHIPPED | OUTPATIENT
Start: 2025-07-16 | End: 2026-01-12

## 2025-07-16 RX ORDER — CLOPIDOGREL BISULFATE 75 MG/1
75 TABLET ORAL DAILY
Qty: 90 TABLET | Refills: 1 | Status: SHIPPED | OUTPATIENT
Start: 2025-07-16 | End: 2026-01-12

## 2025-07-16 RX ORDER — ATORVASTATIN CALCIUM 40 MG/1
40 TABLET, FILM COATED ORAL NIGHTLY
Qty: 90 TABLET | Refills: 1 | Status: SHIPPED | OUTPATIENT
Start: 2025-07-16 | End: 2026-01-12

## 2025-07-16 RX ORDER — AMLODIPINE BESYLATE 10 MG/1
10 TABLET ORAL DAILY
Qty: 90 TABLET | Refills: 1 | Status: SHIPPED | OUTPATIENT
Start: 2025-07-16 | End: 2026-01-12

## 2025-07-16 RX ORDER — CARVEDILOL 3.12 MG/1
3.12 TABLET ORAL 2 TIMES DAILY WITH MEALS
Qty: 180 TABLET | Refills: 1 | Status: SHIPPED | OUTPATIENT
Start: 2025-07-16 | End: 2026-01-12

## 2025-07-16 RX ORDER — ALBUTEROL SULFATE 0.83 MG/ML
2.5 SOLUTION RESPIRATORY (INHALATION) EVERY 6 HOURS PRN
Qty: 120 ML | Refills: 3 | Status: SHIPPED | OUTPATIENT
Start: 2025-07-16

## 2025-07-16 RX ORDER — FLUTICASONE FUROATE, UMECLIDINIUM BROMIDE AND VILANTEROL TRIFENATATE 100; 62.5; 25 UG/1; UG/1; UG/1
1 POWDER RESPIRATORY (INHALATION) DAILY
Qty: 2 EACH | Refills: 5 | Status: SHIPPED | OUTPATIENT
Start: 2025-07-16

## 2025-07-16 RX ORDER — ALBUTEROL SULFATE 90 UG/1
2 INHALANT RESPIRATORY (INHALATION) 4 TIMES DAILY PRN
Qty: 1 EACH | Refills: 5 | Status: SHIPPED | OUTPATIENT
Start: 2025-07-16

## 2025-07-16 ASSESSMENT — PATIENT HEALTH QUESTIONNAIRE - PHQ9
4. FEELING TIRED OR HAVING LITTLE ENERGY: SEVERAL DAYS
7. TROUBLE CONCENTRATING ON THINGS, SUCH AS READING THE NEWSPAPER OR WATCHING TELEVISION: NOT AT ALL
3. TROUBLE FALLING OR STAYING ASLEEP: SEVERAL DAYS
1. LITTLE INTEREST OR PLEASURE IN DOING THINGS: NEARLY EVERY DAY
9. THOUGHTS THAT YOU WOULD BE BETTER OFF DEAD, OR OF HURTING YOURSELF: NOT AT ALL
SUM OF ALL RESPONSES TO PHQ QUESTIONS 1-9: 7
5. POOR APPETITE OR OVEREATING: SEVERAL DAYS
SUM OF ALL RESPONSES TO PHQ QUESTIONS 1-9: 7
2. FEELING DOWN, DEPRESSED OR HOPELESS: SEVERAL DAYS
6. FEELING BAD ABOUT YOURSELF - OR THAT YOU ARE A FAILURE OR HAVE LET YOURSELF OR YOUR FAMILY DOWN: NOT AT ALL
10. IF YOU CHECKED OFF ANY PROBLEMS, HOW DIFFICULT HAVE THESE PROBLEMS MADE IT FOR YOU TO DO YOUR WORK, TAKE CARE OF THINGS AT HOME, OR GET ALONG WITH OTHER PEOPLE: SOMEWHAT DIFFICULT
SUM OF ALL RESPONSES TO PHQ QUESTIONS 1-9: 7
SUM OF ALL RESPONSES TO PHQ QUESTIONS 1-9: 7
8. MOVING OR SPEAKING SO SLOWLY THAT OTHER PEOPLE COULD HAVE NOTICED. OR THE OPPOSITE, BEING SO FIGETY OR RESTLESS THAT YOU HAVE BEEN MOVING AROUND A LOT MORE THAN USUAL: NOT AT ALL

## 2025-07-16 ASSESSMENT — LIFESTYLE VARIABLES
HOW MANY STANDARD DRINKS CONTAINING ALCOHOL DO YOU HAVE ON A TYPICAL DAY: 1 OR 2
HOW OFTEN DO YOU HAVE A DRINK CONTAINING ALCOHOL: MONTHLY OR LESS

## 2025-07-16 NOTE — PATIENT INSTRUCTIONS
on your own.  To measure your ADLs, your doctor will ask how hard it is for you to do routine tasks. Your doctor may also want to know if you have changed the way you do a task because of a health problem. Your doctor may watch how you:  Walk back and forth.  Keep your balance while you stand or walk.  Move from sitting to standing or from a bed to a chair.  Button or unbutton a shirt or sweater.  Remove and put on your shoes.  It's common to feel a little worried or anxious if you find you can't do all the things you used to be able to do. Talking with your doctor about ADLs is a way to make sure you're as safe as possible and able to care for yourself as well as you can. You may want to bring a caregiver, friend, or family member to your checkup. They can help you talk to your doctor.  Follow-up care is a key part of your treatment and safety. Be sure to make and go to all appointments, and call your doctor if you are having problems. It's also a good idea to know your test results and keep a list of the medicines you take.  Current as of: October 24, 2024  Content Version: 14.5  © 2024-2025 [employer].   Care instructions adapted under license by [employer]. If you have questions about a medical condition or this instruction, always ask your healthcare professional. [employer], disclaims any warranty or liability for your use of this information.         Eating Healthy Foods: Care Instructions  With every meal, you can make healthy food choices. Try to eat a variety of fruits, vegetables, whole grains, lean proteins, and low-fat dairy products. This can help you get the right balance of nutrients, including vitamins and minerals. Small changes add up over time. You can start by adding one healthy food to your meals each day.    Try to make half your plate fruits and vegetables, one-fourth whole grains, and one-fourth lean proteins. Try including dairy with your meals.   Eat more fruits

## 2025-07-16 NOTE — PROGRESS NOTES
Medicare Annual Wellness Visit    Jacky Olivares is here for Medicare AWV    Assessment & Plan   Welcome to Medicare preventive visit  Primary hypertension  -     amLODIPine (NORVASC) 10 MG tablet; Take 1 tablet by mouth daily, Disp-90 tablet, R-1Normal  Coronary artery disease involving native heart without angina pectoris, unspecified vessel or lesion type  -     aspirin 81 MG chewable tablet; Take 1 tablet by mouth daily, Disp-90 tablet, R-1Normal  -     atorvastatin (LIPITOR) 40 MG tablet; Take 1 tablet by mouth nightly, Disp-90 tablet, R-1Normal  -     carvedilol (COREG) 3.125 MG tablet; Take 1 tablet by mouth 2 times daily (with meals), Disp-180 tablet, R-1Normal  -     clopidogrel (PLAVIX) 75 MG tablet; Take 1 tablet by mouth daily, Disp-90 tablet, R-1Normal  Simple chronic bronchitis (HCC)  -     albuterol sulfate HFA (PROVENTIL;VENTOLIN;PROAIR) 108 (90 Base) MCG/ACT inhaler; Inhale 2 puffs into the lungs 4 times daily as needed for Wheezing, Disp-1 each, R-5Normal  -     albuterol (PROVENTIL) (2.5 MG/3ML) 0.083% nebulizer solution; Take 3 mLs by nebulization every 6 hours as needed for Wheezing, Disp-120 mL, R-3Normal  -     fluticasone-umeclidin-vilant (TRELEGY ELLIPTA) 100-62.5-25 MCG/ACT AEPB inhaler; Inhale 1 puff into the lungs daily, Disp-2 each, R-5Normal  Vitamin D deficiency  Squamous cell carcinoma of lung, unspecified laterality (HCC)       No follow-ups on file.     Subjective     SCC of lung. Following with pulmonology and rad/onc.  Had radiation treatment today.   The following acute and/or chronic problems were also addressed today:    Patient states he is moving to [place] next week.  This will help his living conditions as he will be moving in with his daughter.  We had a long discussion regarding if patient is planning on establishing with new providers are traveling back and forth to [place] to continue care.  States he is planning on establishing with new providers.     Squamous cell

## 2025-07-17 ENCOUNTER — APPOINTMENT (OUTPATIENT)
Dept: RADIATION ONCOLOGY | Age: 75
End: 2025-07-17
Payer: COMMERCIAL

## 2025-07-17 ENCOUNTER — TELEPHONE (OUTPATIENT)
Age: 75
End: 2025-07-17

## 2025-07-17 NOTE — TELEPHONE ENCOUNTER
Name: Jacky Olivares  : 1950  MRN: [id number]    Oncology Navigation Follow-Up Note    Contact Type:  Telephone    Notes: Navigator left VM offering assistance if needed.       Electronically signed by Zeinab Pastrana RN on 2025 at 3:28 PM

## 2025-07-18 ENCOUNTER — HOSPITAL ENCOUNTER (OUTPATIENT)
Dept: RADIATION ONCOLOGY | Age: 75
Discharge: HOME | End: 2025-07-18
Payer: COMMERCIAL

## 2025-07-18 PROCEDURE — 77373 STRTCTC BDY RAD THER TX DLVR: CPT | Performed by: RADIOLOGY

## 2025-07-21 ENCOUNTER — OFFICE VISIT (OUTPATIENT)
Dept: PULMONOLOGY | Age: 75
End: 2025-07-21

## 2025-07-21 ENCOUNTER — HOSPITAL ENCOUNTER (OUTPATIENT)
Dept: RADIATION ONCOLOGY | Age: 75
Discharge: HOME | End: 2025-07-21
Payer: COMMERCIAL

## 2025-07-21 VITALS
SYSTOLIC BLOOD PRESSURE: 123 MMHG | RESPIRATION RATE: 14 BRPM | OXYGEN SATURATION: 93 % | HEIGHT: 74 IN | BODY MASS INDEX: 20.79 KG/M2 | DIASTOLIC BLOOD PRESSURE: 82 MMHG | WEIGHT: 162 LBS | HEART RATE: 100 BPM

## 2025-07-21 DIAGNOSIS — R91.8 LUNG MASS: ICD-10-CM

## 2025-07-21 DIAGNOSIS — Z87.891 HISTORY OF SMOKING GREATER THAN 50 PACK YEARS: ICD-10-CM

## 2025-07-21 DIAGNOSIS — R06.09 DYSPNEA ON EXERTION: ICD-10-CM

## 2025-07-21 DIAGNOSIS — R91.1 RIGHT UPPER LOBE PULMONARY NODULE: Primary | ICD-10-CM

## 2025-07-21 DIAGNOSIS — J44.9 CHRONIC OBSTRUCTIVE PULMONARY DISEASE, UNSPECIFIED COPD TYPE (HCC): ICD-10-CM

## 2025-07-21 LAB
DISTANCE WALKED: NORMAL
MICROORGANISM SPEC CULT: NORMAL
MICROORGANISM/AGENT SPEC: NORMAL
SERVICE CMNT-IMP: NORMAL
SPECIMEN DESCRIPTION: NORMAL
SPO2: NORMAL

## 2025-07-21 PROCEDURE — 77373 STRTCTC BDY RAD THER TX DLVR: CPT | Performed by: RADIOLOGY

## 2025-07-21 NOTE — PROGRESS NOTES
Six Minute Walk Test  [facility]  [address] [address]  Phone: [phone]    Fax: [phone]    Name: Jacky Olivares     Gender: Male  YOB: 1950     Age: 74 y.o.  MRN: [id number]      Date Completed: 7/21/2025    SaO2 @ Rest on Room Air: 94%  HR: 91    O2 Needed: No     SaO2 @ One Minutes: 89%   HR: 105   O2 Needed: No     SaO2 @ Two Minutes: 86%   HR: 105   O2 Needed: Yes  2   SaO2 @ Three Minutes: 93%              HR: 101   O2 Needed: Yes  2   SaO2 @ Four Minutes: 93%   HR: 101   O2 Needed: Yes  2   SaO2 @ Five Minutes: 93%   HR: 102   O2 Needed: Yes  2   SaO2 @ Six Minutes: 93%   HR: 101   O2 Needed: Yes  2      Total Distance Walked:  750 feet     Pace:  Very Slow            Accessories Needed: None         Was walk test completed?  Yes  If no, why       Comments: Patient walked for a distance of 200 feet. The patients's 02 saturation dropped to 86%. Oxygen was started at a setting of 2 using pulse dose.  Patient walked another 500 feet and saturation remained above 90%.      Test Completed By:  Serenity Murphy CMA

## 2025-07-21 NOTE — PROGRESS NOTES
OUTPATIENT PULMONARY CONSULT NOTE      Patient:  Jacky Olivares  MRN: [id number]    Consulting Physician: Jenise Song APRN - CNP  Reason for Consult: Right upper lobe lung nodule  Primacy Care Physician: Jenise Song APRN - CNP    HISTORY OF PRESENT ILLNESS:     History of Present Illness  The patient is a 74-year-old male who presents for follow-up of a right upper lobe  lung mass, chronic obstructive pulmonary disease (COPD), dyspnea on exertion, and a history of smoking greater than 50 pack-years.    He was first seen on 06/04/2025 and subsequently underwent a series of diagnostic procedures, including a robotic bronchoscopy with radial EBUS probe, transbronchial needle aspiration, transbronchial biopsies, bronchial brushing, and mini BAL on 06/19/2025. The surgical pathology was positive for squamous cell cancer. He was made aware of the diagnosis and referred to oncology and radiation oncology. He was seen by radiation oncology on 06/26/2025 and has since completed three out of five planned SBRT treatments. He was also seen by medical oncology on 06/03/2025.    A pulmonary function test conducted on 06/05/2025 showed severe COPD with an FEV1 of 39% predicted, post-bronchodilator FEV1 of 37%, total lung capacity of 86%, and diffusion capacity of 44%. He reports quitting smoking when he started radiation therapy.    He experiences shortness of breath after minimal exertion and has a daily cough with clear phlegm production. He also reports wheezing but does not experience coughing, wheezing, or shortness of breath at night. He spent most of the previous day sleeping, which is unusual for him. He has completed a 6-minute walk test and uses an albuterol inhaler as needed and Trelegy once daily.     He has declined the influenza, pneumonia, and COVID-19 vaccines. He has been advised to use oxygen but prefers to wait and see if his breathing improves after quitting smoking.    SOCIAL HISTORY  Exercise: The patient

## 2025-07-22 ENCOUNTER — APPOINTMENT (OUTPATIENT)
Dept: RADIATION ONCOLOGY | Age: 75
End: 2025-07-22
Payer: COMMERCIAL

## 2025-07-22 ENCOUNTER — TELEPHONE (OUTPATIENT)
Age: 75
End: 2025-07-22

## 2025-07-22 NOTE — TELEPHONE ENCOUNTER
Name: Jacky Olivares  : 1950  MRN: [id number]    Oncology Navigation Follow-Up Note    Contact Type:  Telephone    Notes: Navigator spoke with Dr. Homlan and he will see pt. In 3 months  Please schedule pt. For F/U with Dr. Holman SA in 3 months.      Electronically signed by Zeinab Pastrana RN on 2025 at 1:53 PM

## 2025-07-23 ENCOUNTER — HOSPITAL ENCOUNTER (OUTPATIENT)
Dept: RADIATION ONCOLOGY | Age: 75
Discharge: HOME | End: 2025-07-23
Payer: COMMERCIAL

## 2025-07-23 VITALS
TEMPERATURE: 97.7 F | BODY MASS INDEX: 20.81 KG/M2 | DIASTOLIC BLOOD PRESSURE: 87 MMHG | SYSTOLIC BLOOD PRESSURE: 126 MMHG | HEART RATE: 105 BPM | OXYGEN SATURATION: 92 % | RESPIRATION RATE: 16 BRPM | WEIGHT: 162.1 LBS

## 2025-07-23 DIAGNOSIS — C34.91 SQUAMOUS CELL CARCINOMA OF RIGHT LUNG (HCC): Primary | ICD-10-CM

## 2025-07-23 PROCEDURE — 77373 STRTCTC BDY RAD THER TX DLVR: CPT | Performed by: RADIOLOGY

## 2025-07-23 NOTE — PROGRESS NOTES
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  Reports unchanged shortness of breath at baseline, denies any other complaints    OBJECTIVE:     ECO Asymptomatic    VITAL SIGNS: /87   Pulse (!) 105   Temp 97.7 °F (36.5 °C) (Temporal)   Resp 16   Wt 73.5 kg (162 lb 1.6 oz)   SpO2 92%   BMI 20.81 kg/m²   Wt Readings from Last 5 Encounters:   25 73.5 kg (162 lb 1.6 oz)   25 73.5 kg (162 lb)   25 74.5 kg (164 lb 3.2 oz)   25 72.6 kg (160 lb)   25 72.9 kg (160 lb 11.2 oz)     GENERAL:  General appearance is that of a well-nourished, well-developed in no apparent distress.  HEART:  Normal rate and regular rhythm  LUNGS:  Pulmonary effort normal.  ABDOMEN:  Soft, nontender, non distended  EXTREMITIES:  No edema.  No calf tenderness.  MSK:  No spinal tenderness. Normal ROM.  NEUROLOGICAL: Alert and oriented. Strength and sensation intact bilaterally. No focal deficits.   PSYCH: Mood normal, behavior normal.      LABS:  WBC   Date Value Ref Range Status   2024 9.9 3.5 - 11.3 k/uL Final   2024 11.1 3.5 - 11.3 k/uL Final   2024 9.5 3.5 - 11.3 k/uL Final     Neutrophils Absolute   Date Value Ref Range Status   2024

## 2025-07-23 NOTE — PROGRESS NOTES
Jacky Olivares  7/23/2025  Wt Readings from Last 3 Encounters:   07/23/25 73.5 kg (162 lb 1.6 oz)   07/21/25 73.5 kg (162 lb)   07/16/25 74.5 kg (164 lb 3.2 oz)     Body mass index is 20.81 kg/m².        Treatment Area: [facility]    Patient was seen today for weekly visit.      Comfort Alteration  Fatigue: Mild    Ventilation Alterations  Cough: Yes  Hemoptysis: No  Mucus Color: clear/yellow  Dyspnea: Yes      Nutritional Alteration  Anorexia: No  Nausea: No   Vomiting: No     Mucous Membrane Alteration  Voice Changes/ Stridor/Larynx: no  Pharynx & Esophagus: in tact    Elimination Alterations  Constipation: Yes - resolved  Diarrhea:  no    Skin Alteration   Sensation: WDL    Radiation Dermatitis:  Intact - yes     Erythema  []     Discoloration  []     Rash []     Dry desquamation  []     Moist desquamation []       Emotional  Coping: effective      Injury, potential bleeding or infection:  none currently    Lab Results   Component Value Date    WBC 9.9 12/20/2024    HGB 12.1 (L) 12/20/2024    HCT 39.3 (L) 12/20/2024    PLT See Reflexed IPF Result 12/20/2024         /87   Pulse (!) 105   Temp 97.7 °F (36.5 °C) (Temporal)   Resp 16   Wt 73.5 kg (162 lb 1.6 oz)   SpO2 92%   BMI 20.81 kg/m²      Pain Assessment: None - Denies Pain            Assessment/Plan: Patient was seen today for weekly visit.  Ambulatory with a steady gait.  Pt denies any pain at this time.  States he always has shortness of breath especially when walking or doing activities.  Pt is supposed to be getting set up with home 02.  He states an occasional productive cough that \"comes from his head.\"  No other issues.  To follow up in office in 3 months with a repeat scan.      Linh Sumner RN

## 2025-07-24 ENCOUNTER — APPOINTMENT (OUTPATIENT)
Dept: RADIATION ONCOLOGY | Age: 75
End: 2025-07-24
Payer: COMMERCIAL

## 2025-07-24 ENCOUNTER — TELEPHONE (OUTPATIENT)
Age: 75
End: 2025-07-24

## 2025-07-24 NOTE — TELEPHONE ENCOUNTER
received referral stating patient concerned about medical costs.  called daughter and left a message.  will check in at appointment tomorrow.

## 2025-07-24 NOTE — TELEPHONE ENCOUNTER
Name: Jacky Olivares  : 1950  MRN: [id number]    Oncology Navigation Follow-Up Note    Contact Type:  Telephone    Notes: Navigator reviewing chart and pt./daughter requesting assistance with Eli [employer] Writer spoke with Vimal for assistance.       Electronically signed by Zeinab Pastrana RN on 2025 at 10:43 AM

## 2025-07-25 ENCOUNTER — CLINICAL DOCUMENTATION (OUTPATIENT)
Dept: RADIATION ONCOLOGY | Age: 75
End: 2025-07-25

## 2025-07-25 ENCOUNTER — HOSPITAL ENCOUNTER (OUTPATIENT)
Dept: RADIATION ONCOLOGY | Age: 75
Discharge: HOME | End: 2025-07-25
Payer: COMMERCIAL

## 2025-07-25 ENCOUNTER — TELEPHONE (OUTPATIENT)
Dept: INFUSION THERAPY | Facility: MEDICAL CENTER | Age: 75
End: 2025-07-25

## 2025-07-25 PROCEDURE — 77373 STRTCTC BDY RAD THER TX DLVR: CPT | Performed by: RADIOLOGY

## 2025-07-25 NOTE — TELEPHONE ENCOUNTER
WRITER CALLED PATIENT TO GET HIM SCHEDULED FOR A 3 MONTH APPOINTMENT WITH DR MAHONEY. PATIENT DID NOT ANSWER, BUT VM WAS LEFT FOR PATIENT TO CALL THE OFFICE.

## 2025-07-25 NOTE — PROGRESS NOTES
[facility]            Radiation Oncology          [address] [place] [address]        O: [phone]        F: [phone]       [facility]           Dear Dr Narvaez ref. provider found:    Thank you for referring Jacky Olivares to me for evaluation and treatment. Below is a summary of the patient's recently completed radiation course.  If you have questions, please do not hesitate to call me. I look forward to following this patient along with you.    Sincerely,  Electronically signed by Jones Huynh MD on 2025 at 12:36 PM EDT      CC: Patient Care Team:  Jenise Song APRN - CNP as PCP - General (Certified Nurse Practitioner)  Jenise Song APRN - CNP as PCP - Empaneled Provider  Zeinab Pastrana RN as Nurse Navigator (Oncology)  ------------------------------------------------------------------------------------------------------------------------------------------------------------------------------------------        Date of Service: 2025     Location:  [facility],   [address] [place] [address], [place] [id number] [phone]        RADIATION ONCOLOGY END OF TREATMENT SUMMARY:    Patient ID:   Jacky Olivares  : 1950   MRN: [id number]    DIAGNOSIS:  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  No significant toxicities were reported during his course of therapy.  He is to have a posttreatment CT chest in 3 months and will return to the radiation clinic afterwards.

## 2025-07-28 ENCOUNTER — APPOINTMENT (OUTPATIENT)
Dept: RADIATION ONCOLOGY | Age: 75
End: 2025-07-28
Payer: COMMERCIAL

## 2025-07-29 ENCOUNTER — APPOINTMENT (OUTPATIENT)
Dept: RADIATION ONCOLOGY | Age: 75
End: 2025-07-29
Payer: COMMERCIAL

## 2025-08-04 LAB
MICROORGANISM SPEC CULT: NORMAL
MICROORGANISM/AGENT SPEC: NORMAL
SERVICE CMNT-IMP: NORMAL
SPECIMEN DESCRIPTION: NORMAL

## (undated) DEVICE — STRAP ARMBRD W1.5XL32IN FOAM STR YET SFT W/ HK AND LOOP

## (undated) DEVICE — BIOPSY NEEDLE, 21G: Brand: FLEXISION

## (undated) DEVICE — VISION PROBE ADAPTER AND SUCTION ADAPTER

## (undated) DEVICE — SWIVEL CONNECTOR

## (undated) DEVICE — ANGIOGRAPHIC CATHETER: Brand: EXPO™

## (undated) DEVICE — CONMED DISPOSABLE MICROBIOLOGY BRUSH, Ø1 MM, 1.8 MM WORKING DIAMETER, 110 CM LENGTH: Brand: CONMED

## (undated) DEVICE — SET EXTN L7IN PRIMING VOL 0.4ML PRSS RATE MINIBOR 1 REM

## (undated) DEVICE — GLIDESHEATH SLENDER STAINLESS STEEL KIT: Brand: GLIDESHEATH SLENDER

## (undated) DEVICE — MAJ-1414 SINGLE USE ADPATER BIOPSY VALV: Brand: SINGLE USE ADAPTOR BIOPSY VALVE

## (undated) DEVICE — BAND COMPR L21CM SHT CLR PLAS HEMSTAT EXT HK AND LOOP RETEN

## (undated) DEVICE — SINGLE USE BIOPSY VALVE MAJ-210: Brand: SINGLE USE BIOPSY VALVE (STERILE)

## (undated) DEVICE — Device: Brand: ION

## (undated) DEVICE — STRAP,POSITIONING,KNEE/BODY,FOAM,4X60": Brand: MEDLINE

## (undated) DEVICE — GARMENT,MEDLINE,DVT,INT,CALF,MED, GEN2: Brand: MEDLINE

## (undated) DEVICE — SURGICAL PROCEDURE TRAY CRD CATH SVMMC

## (undated) DEVICE — Device: Brand: BALLOON

## (undated) DEVICE — SINGLE USE SUCTION VALVE MAJ-209: Brand: SINGLE USE SUCTION VALVE (STERILE)

## (undated) DEVICE — SINGLE-USE BIOPSY FORCEPS: Brand: RADIAL JAW 4